# Patient Record
Sex: FEMALE | Race: OTHER | HISPANIC OR LATINO | Employment: OTHER | URBAN - METROPOLITAN AREA
[De-identification: names, ages, dates, MRNs, and addresses within clinical notes are randomized per-mention and may not be internally consistent; named-entity substitution may affect disease eponyms.]

---

## 2017-03-08 DIAGNOSIS — E78.5 DYSLIPIDEMIA: ICD-10-CM

## 2017-03-08 DIAGNOSIS — I10 ESSENTIAL HYPERTENSION: ICD-10-CM

## 2017-03-08 RX ORDER — LISINOPRIL 10 MG/1
10 TABLET ORAL DAILY
Qty: 30 TAB | Refills: 0 | OUTPATIENT
Start: 2017-03-08

## 2017-03-08 RX ORDER — SIMVASTATIN 20 MG
20 TABLET ORAL EVERY EVENING
Qty: 30 TAB | Refills: 0 | OUTPATIENT
Start: 2017-03-08

## 2017-03-08 NOTE — TELEPHONE ENCOUNTER
Was the patient seen in the last year in this department? No     Does patient have an active prescription for medications requested? No     Received Request Via: Patient

## 2017-06-16 ENCOUNTER — TELEPHONE (OUTPATIENT)
Dept: MEDICAL GROUP | Facility: PHYSICIAN GROUP | Age: 57
End: 2017-06-16

## 2017-06-16 ENCOUNTER — OFFICE VISIT (OUTPATIENT)
Dept: URGENT CARE | Facility: PHYSICIAN GROUP | Age: 57
End: 2017-06-16
Payer: MEDICARE

## 2017-06-16 VITALS
SYSTOLIC BLOOD PRESSURE: 134 MMHG | HEART RATE: 89 BPM | HEIGHT: 64 IN | WEIGHT: 150 LBS | BODY MASS INDEX: 25.61 KG/M2 | TEMPERATURE: 97.4 F | OXYGEN SATURATION: 92 % | DIASTOLIC BLOOD PRESSURE: 74 MMHG

## 2017-06-16 DIAGNOSIS — Z76.0 ENCOUNTER FOR MEDICATION REFILL: ICD-10-CM

## 2017-06-16 DIAGNOSIS — Z86.59 HISTORY OF ANXIETY DISORDER: ICD-10-CM

## 2017-06-16 DIAGNOSIS — Z87.898 HISTORY OF CHRONIC PAIN: ICD-10-CM

## 2017-06-16 PROCEDURE — 99213 OFFICE O/P EST LOW 20 MIN: CPT | Performed by: EMERGENCY MEDICINE

## 2017-06-16 RX ORDER — IBUPROFEN 800 MG/1
800 TABLET ORAL EVERY 8 HOURS PRN
COMMUNITY
End: 2017-06-16 | Stop reason: SDUPTHER

## 2017-06-16 RX ORDER — ALPRAZOLAM 0.5 MG/1
0.5 TABLET ORAL NIGHTLY PRN
COMMUNITY
End: 2017-07-18

## 2017-06-16 RX ORDER — IBUPROFEN 800 MG/1
800 TABLET ORAL EVERY 8 HOURS PRN
Qty: 30 TAB | Refills: 0 | Status: SHIPPED | OUTPATIENT
Start: 2017-06-16 | End: 2018-10-19

## 2017-06-16 RX ORDER — ZOLPIDEM TARTRATE 10 MG/1
10 TABLET ORAL NIGHTLY PRN
Qty: 5 TAB | Refills: 0 | Status: SHIPPED | OUTPATIENT
Start: 2017-06-16 | End: 2017-06-21

## 2017-06-16 RX ORDER — BACLOFEN 10 MG/1
10 TABLET ORAL 3 TIMES DAILY
Qty: 15 TAB | Refills: 0 | Status: SHIPPED | OUTPATIENT
Start: 2017-06-16 | End: 2017-06-21

## 2017-06-16 RX ORDER — ZOLPIDEM TARTRATE 10 MG/1
10 TABLET ORAL NIGHTLY PRN
COMMUNITY
End: 2017-06-16 | Stop reason: SDUPTHER

## 2017-06-16 RX ORDER — FENTANYL 50 UG/1
1 PATCH TRANSDERMAL
COMMUNITY
End: 2018-10-19

## 2017-06-16 RX ORDER — TRAMADOL HYDROCHLORIDE 50 MG/1
50 TABLET ORAL EVERY 4 HOURS PRN
COMMUNITY
End: 2017-06-16 | Stop reason: SDUPTHER

## 2017-06-16 RX ORDER — OXYCODONE HYDROCHLORIDE 10 MG/1
10 TABLET ORAL EVERY 4 HOURS PRN
Qty: 28 TAB | Refills: 0 | Status: SHIPPED | OUTPATIENT
Start: 2017-06-16 | End: 2017-06-21

## 2017-06-16 RX ORDER — BACLOFEN 10 MG/1
10 TABLET ORAL 3 TIMES DAILY
COMMUNITY
End: 2017-06-16 | Stop reason: SDUPTHER

## 2017-06-16 RX ORDER — FENOFIBRATE 145 MG/1
145 TABLET, COATED ORAL DAILY
Qty: 5 TAB | Refills: 0 | Status: SHIPPED | OUTPATIENT
Start: 2017-06-16 | End: 2017-06-21

## 2017-06-16 RX ORDER — FENOFIBRATE 145 MG/1
145 TABLET, COATED ORAL DAILY
COMMUNITY
End: 2017-06-16 | Stop reason: SDUPTHER

## 2017-06-16 RX ORDER — TRAMADOL HYDROCHLORIDE 50 MG/1
50 TABLET ORAL EVERY 4 HOURS PRN
Qty: 20 TAB | Refills: 0 | Status: SHIPPED | OUTPATIENT
Start: 2017-06-16 | End: 2017-06-21

## 2017-06-16 RX ORDER — FENTANYL 25 UG/1
1 PATCH TRANSDERMAL
Qty: 2 PATCH | Refills: 0 | Status: SHIPPED | OUTPATIENT
Start: 2017-06-16 | End: 2017-06-21

## 2017-06-16 RX ORDER — GABAPENTIN 800 MG/1
800 TABLET ORAL 3 TIMES DAILY
Status: ON HOLD | COMMUNITY
End: 2019-06-08

## 2017-06-16 RX ORDER — ERGOCALCIFEROL 1.25 MG/1
CAPSULE ORAL
COMMUNITY
End: 2017-07-18

## 2017-06-16 RX ORDER — GABAPENTIN 800 MG/1
800 TABLET ORAL 3 TIMES DAILY
Qty: 15 TAB | Refills: 0 | Status: SHIPPED | OUTPATIENT
Start: 2017-06-16 | End: 2017-06-21

## 2017-06-16 RX ORDER — OXYCODONE HYDROCHLORIDE 10 MG/1
10 TABLET ORAL EVERY 4 HOURS PRN
COMMUNITY
End: 2017-06-16 | Stop reason: SDUPTHER

## 2017-06-16 RX ORDER — LORAZEPAM 0.5 MG/1
0.5 TABLET ORAL 2 TIMES DAILY PRN
Qty: 10 TAB | Refills: 0 | Status: SHIPPED | OUTPATIENT
Start: 2017-06-16 | End: 2017-06-21

## 2017-06-16 ASSESSMENT — ENCOUNTER SYMPTOMS
INSOMNIA: 1
NERVOUS/ANXIOUS: 1
FEVER: 0
SHORTNESS OF BREATH: 0
ABDOMINAL PAIN: 0
DIARRHEA: 1
DEPRESSION: 1
NAUSEA: 1
FOCAL WEAKNESS: 0
VOMITING: 0

## 2017-06-16 NOTE — MR AVS SNAPSHOT
"Elina Skaggs   2017 12:20 PM   Office Visit   MRN: [id number]    Department:  [facility]   Dept Phone:  [phone]    Description:  Female : 1960   Provider:  Lane Lanier M.D.           Reason for Visit     Medication Refill wants a refil on all of medication       Allergies as of 2017     No Known Allergies      You were diagnosed with     Encounter for medication refill   [642086]       History of anxiety disorder   [642147]       History of chronic pain   [0882440]         Vital Signs     Blood Pressure Pulse Temperature Height Weight Body Mass Index    134/74 mmHg 89 36.3 °C (97.4 °F) 1.626 m (5' 4\") 68.04 kg (150 lb) 25.73 kg/m2    Oxygen Saturation Smoking Status                92% Current Every Day Smoker          Basic Information     Date Of Birth Sex Race Ethnicity Preferred Language    1960 Female  or , Other  Origin (Sinhala,Micronesian,Chilean,Equatorial Guinean, etc) English      Problem List              ICD-10-CM Priority Class Noted - Resolved    Radiculopathy M54.10   2012 - Present    Sensory disturbance R20.9   2012 - Present    DDD (degenerative disc disease) [id number]   2012 - Present    Hypertension I10   2012 - Present    Depression F32.9   2012 - Present    Chronic pain G89.29 Low  2012 - Present    Cervical stenosis of spinal canal M48.02 High  2012 - Present    Cervical myelopathy G95.9 High  2012 - Present    Constipation    2013 - Present    Impaired physical mobility Z74.09   2013 - Present    Chronic low back pain M54.5, G89.29   2014 - Present    Anxiety F41.9   2014 - Present    Dyslipidemia E78.5   2015 - Present    Impaired fasting blood sugar R73.01   2/10/2015 - Present    Closed fracture of right fibula S82.401A High  2015 - Present    Closed fracture of lumbar vertebra (CMS-HCC) S32.009A Low  2015 - Present    HTN (hypertension) I10 Medium  2015 - Present  "    Smoker F17.200 Low  6/13/2015 - Present    Trauma T14.90 Low  6/13/2015 - Present    Cellulitis L03.90   6/17/2015 - Present    Leg pain, right M79.604   6/30/2015 - Present    Closed fibular fracture S82.409A   6/30/2015 - Present      Health Maintenance        Date Due Completion Dates    PAP SMEAR 3/28/2016 3/28/2013, 10/25/2010    MAMMOGRAM 7/29/2016 7/29/2015, 6/30/2014, 4/12/2013, 4/9/2012, 11/5/2010, 10/28/2010    COLONOSCOPY 4/1/2024 4/1/2014 (Done), 9/25/2013 (Declined)    Override on 4/1/2014: Done    Override on 9/25/2013: Patient Declined    IMM DTaP/Tdap/Td Vaccine (3 - Td) 6/13/2025 6/13/2015, 10/25/2010            Current Immunizations     Influenza TIV (IM) 11/13/2014    Influenza Vaccine Pediatric 10/25/2010    Influenza Vaccine Quad Inj (Preserved) 11/19/2014    Pneumococcal polysaccharide vaccine (PPSV-23) 6/14/2015 10:36 AM, 6/14/2015, 6/9/2012  3:50 PM    Tdap Vaccine 6/13/2015  3:09 PM, 10/25/2010      Below and/or attached are the medications your provider expects you to take. Review all of your home medications and newly ordered medications with your provider and/or pharmacist. Follow medication instructions as directed by your provider and/or pharmacist. Please keep your medication list with you and share with your provider. Update the information when medications are discontinued, doses are changed, or new medications (including over-the-counter products) are added; and carry medication information at all times in the event of emergency situations     Allergies:  No Known Allergies          Medications  Valid as of: June 16, 2017 -  2:44 PM    Generic Name Brand Name Tablet Size Instructions for use    ALPRAZolam (Tab) XANAX 0.5 MG Take 0.5 mg by mouth as needed for Sleep or Anxiety.        ALPRAZolam (Tab) XANAX 0.25 MG Take 1 Tab by mouth every day.        ALPRAZolam (Tab) XANAX 0.5 MG Take 0.5 mg by mouth at bedtime as needed for Sleep.        ALPRAZolam (Tab) XANAX 0.5 MG Take 0.5 mg  by mouth at bedtime as needed for Sleep.        Bacitracin-Polymyxin B (Ointment) POLYSPORIN 500-25413 UNIT/GM Apply 1 Each to affected area(s) 3 times a day.        Baclofen (Tab) LIORESAL 10 MG Take 1 Tab by mouth 3 times a day for 5 days.        Enalapril Maleate (Tab) VASOTEC 10 MG Take 10 mg by mouth every day. Indications: High Blood Pressure        Enalapril Maleate (Tab) VASOTEC 20 MG TAKE 1 TABLET BY MOUTH TWICE A DAY        Ergocalciferol (Cap) DRISDOL 06157 UNITS Take  by mouth every 7 days.        Fenofibrate (Tab) TRICOR 145 MG Take 1 Tab by mouth every day for 5 days.        FentaNYL (PATCH 72 HR) DURAGESIC 12 MCG/HR Apply 1 Patch to skin as directed every 72 hours.        FentaNYL (PATCH 72 HR) DURAGESIC 12 MCG/HR Apply 1 Patch to skin as directed every 72 hours.        FentaNYL (PATCH 72 HR) DURAGESIC 50 MCG/HR Apply 1 Patch to skin as directed every 72 hours.        FentaNYL (PATCH 72 HR) DURAGESIC 25 MCG/HR Apply 1 Patch to skin as directed every 72 hours for 5 days.        FLUoxetine HCl (Cap) PROZAC 20 MG Take 1 Cap by mouth every day. Indications: Depression        FLUoxetine HCl (Cap) PROZAC 10 MG Take 10 mg by mouth every day. Indications: Depression        Gabapentin (Tab) NEURONTIN 800 MG Take 800 mg by mouth 3 times a day.        Gabapentin (Tab) NEURONTIN 800 MG Take 1 Tab by mouth 3 times a day for 5 days.        HydroCHLOROthiazide (Tab) HYDRODIURIL 25 MG TAKE 1 TABLET BY MOUTH EVERY DAY        Ibuprofen (Tab) MOTRIN 800 MG Take 1 Tab by mouth every 8 hours as needed.        Lisinopril (Tab) PRINIVIL 10 MG Take 10 mg by mouth every day. Indications: High Blood Pressure        LORazepam (Tab) ATIVAN 1 MG Take 0.5 mg by mouth every four hours as needed for Anxiety.        LORazepam (Tab) ATIVAN 0.5 MG Take 1 Tab by mouth 2 times a day as needed for Anxiety for up to 5 days. Indications: Anxiousness associated with Depression        OxyCODONE HCl (Tab) ROXICODONE 5 MG Take 1-2 Tabs by mouth  every 3 hours as needed.        OxyCODONE HCl (Tab) OXY-IR 15 MG Take 15 mg by mouth every four hours as needed for Severe Pain (up to 5 tabs per day).        OxyCODONE HCl (Tab) ROXICODONE 10 MG Take 1 Tab by mouth every four hours as needed for Moderate Pain for up to 5 days.        Oxycodone-Acetaminophen (Tab) PERCOCET 5-325 MG Take 1-2 Tabs by mouth every four hours as needed.        Potassium Chloride Bella CR (Tab CR) Kdur 20 MEQ Take 1 Tab by mouth every day.        Promethazine HCl (Tab) PHENERGAN 25 MG TAKE 1 TABLET BY MOUTH TWICE A DAY        Simvastatin (Tab) ZOCOR 20 MG Take 1 Tab by mouth every evening.        Sulfamethoxazole-Trimethoprim (Tab) BACTRIM DS,SEPTRA -160 MG Take 1 Tab by mouth every 12 hours.        TiZANidine HCl (Tab) ZANAFLEX 4 MG Take 4 mg by mouth 3 times a day.        TraMADol HCl (Tab) ULTRAM 50 MG Take 1 Tab by mouth every four hours as needed for up to 5 days.        Zolpidem Tartrate (Tab) AMBIEN 5 MG Take 5 mg by mouth at bedtime as needed for Sleep.        Zolpidem Tartrate (SL Tab) Zolpidem Tartrate 10 MG Place  under tongue.        Zolpidem Tartrate (Tab) AMBIEN 10 MG Take 1 Tab by mouth at bedtime as needed for Sleep for up to 5 days.        .                 Medicines prescribed today were sent to:     [facility]/PHARMACY #[id number] - [place], [place] - [address] [address]    Phone: [phone] Fax: [phone]    Open 24 Hours?: No      Medication refill instructions:       If your prescription bottle indicates you have medication refills left, it is not necessary to call your provider’s office. Please contact your pharmacy and they will refill your medication.    If your prescription bottle indicates you do not have any refills left, you may request refills at any time through one of the following ways: The online [employer] system (except Urgent Care), by calling your provider’s office, or by asking your pharmacy to  contact your provider’s office with a refill request. Medication refills are processed only during regular business hours and may not be available until the next business day. Your provider may request additional information or to have a follow-up visit with you prior to refilling your medication.   *Please Note: Medication refills are assigned a new Rx number when refilled electronically. Your pharmacy may indicate that no refills were authorized even though a new prescription for the same medication is available at the pharmacy. Please request the medicine by name with the pharmacy before contacting your provider for a refill.        Other Notes About Your Plan     Elina is a Medical Home Patient at [facility].           Navegg Access Code: [email or web address]  Expires: 7/16/2017  2:44 PM    Navegg  A secure, online tool to manage your health information     [employer] Navegg® is a secure, online tool that connects you to your personalized health information from the privacy of your home -- day or night - making it very easy for you to manage your healthcare. Once the activation process is completed, you can even access your medical information using the Navegg nathalie, which is available for free in the [employer] Nathalie store or Google Play store.     Navegg provides the following levels of access (as shown below):   My Chart Features   [facility] Doctor [facility]  Specialists [facility]  Urgent  Care Non-[facility]  Primary Care  Doctor   Email your healthcare team securely and privately 24/7 X X X    Manage appointments: schedule your next appointment; view details of past/upcoming appointments X      Request prescription refills. X      View recent personal medical records, including lab and immunizations X X X X   View health record, including health history, allergies, medications X X X X   Read reports about your outpatient visits, procedures, consult and ER notes X X X X   See your discharge summary,  which is a recap of your hospital and/or ER visit that includes your diagnosis, lab results, and care plan. X X       How to register for [employer]:  1. Go to  [email or web address].  2. Click on the Sign Up Now box, which takes you to the New Member Sign Up page. You will need to provide the following information:  a. Enter your [employer] Access Code exactly as it appears at the top of this page. (You will not need to use this code after you’ve completed the sign-up process. If you do not sign up before the expiration date, you must request a new code.)   b. Enter your date of birth.   c. Enter your home email address.   d. Click Submit, and follow the next screen’s instructions.  3. Create a [employer] ID. This will be your [employer] login ID and cannot be changed, so think of one that is secure and easy to remember.  4. Create a [employer] password. You can change your password at any time.  5. Enter your Password Reset Question and Answer. This can be used at a later time if you forget your password.   6. Enter your e-mail address. This allows you to receive e-mail notifications when new information is available in [employer].  7. Click Sign Up. You can now view your health information.    For assistance activating your [employer] account, call [phone]        Quit Tobacco Information     Do you want to quit using tobacco?    Quitting tobacco decreases risks of cancer, heart and lung disease, increases life expectancy, improves sense of taste and smell, and increases spending money, among other benefits.    If you are thinking about quitting, we can help.  • [employer] Quit Tobacco Program: 360.148.1810  o Program occurs weekly for four weeks and includes pharmacist consultation on products to support quitting smoking or chewing tobacco. A provider referral is needed for pharmacist consultation.  • Tobacco Users Help Hotline: 5-605-QUIT-NOW (303-8735) or https://[place].quitlogix.org/  o Free, confidential telephone and  online coaching for [place] residents. Sessions are designed on a schedule that is convenient for you. Eligible clients receive free nicotine replacement therapy.  • Nationally: www.smokefree.gov  o Information and professional assistance to support both immediate and long-term needs as you become, and remain, a non-smoker. Smokefree.gov allows you to choose the help that best fits your needs.

## 2017-06-16 NOTE — PROGRESS NOTES
Subjective:      Elina Skaggs is a 57 y.o. female who presents with Medication Refill            Other  This is a chronic problem. The problem occurs daily. The problem has been unchanged. Associated symptoms include nausea. Pertinent negatives include no abdominal pain, chest pain, fever, rash or vomiting.    patient states previously seen by a local primary care provider and pain management specialist. Was prescribed a course of medication that she tolerated well; moved out of state for 3 months. States returned to live here again 3 days ago; states that her estranged  got rid of all of her medications before her departure. She did not attempt to refill medications with the out-of-state providers; presents requesting refill for all medications.    Review of Systems   Constitutional: Negative for fever.   Respiratory: Negative for shortness of breath.    Cardiovascular: Negative for chest pain.   Gastrointestinal: Positive for nausea and diarrhea. Negative for vomiting and abdominal pain.   Skin: Negative for rash.   Neurological: Negative for focal weakness.   Psychiatric/Behavioral: Positive for depression. Negative for suicidal ideas. The patient is nervous/anxious and has insomnia.      PMH:  has a past medical history of Hypertension; Dental disorder; Snoring; Sleep apnea; Psychiatric problem; Anxiety (1993); Constipation (9/27/2013); Impaired physical mobility (9/30/2013); Chronic back pain; and MEDICAL HOME (6/2015).  MEDS:   Current outpatient prescriptions:   •  gabapentin (NEURONTIN) 800 MG tablet, Take 800 mg by mouth 3 times a day., Disp: , Rfl:   •  alprazolam (XANAX) 0.5 MG Tab, Take 0.5 mg by mouth at bedtime as needed for Sleep., Disp: , Rfl:   •  fentanyl (DURAGESIC) 50 MCG/HR PATCH 72 HR, Apply 1 Patch to skin as directed every 72 hours., Disp: , Rfl:   •  Zolpidem Tartrate 10 MG SL Tab, Place  under tongue., Disp: , Rfl:   •  alprazolam (XANAX) 0.5 MG Tab, Take 0.5 mg by mouth at bedtime  as needed for Sleep., Disp: , Rfl:   •  vitamin D, Ergocalciferol, (DRISDOL) 53833 UNITS Cap capsule, Take  by mouth every 7 days., Disp: , Rfl:   •  lorazepam (ATIVAN) 0.5 MG Tab, Take 1 Tab by mouth 2 times a day as needed for Anxiety for up to 5 days. Indications: Anxiousness associated with Depression, Disp: 10 Tab, Rfl: 0  •  fentanyl (DURAGESIC) 25 MCG/HR PATCH 72 HR, Apply 1 Patch to skin as directed every 72 hours for 5 days., Disp: 2 Patch, Rfl: 0  •  oxycodone immediate release (ROXICODONE) 10 MG immediate release tablet, Take 1 Tab by mouth every four hours as needed for Moderate Pain for up to 5 days., Disp: 28 Tab, Rfl: 0  •  baclofen (LIORESAL) 10 MG Tab, Take 1 Tab by mouth 3 times a day for 5 days., Disp: 15 Tab, Rfl: 0  •  gabapentin (NEURONTIN) 800 MG tablet, Take 1 Tab by mouth 3 times a day for 5 days., Disp: 15 Tab, Rfl: 0  •  zolpidem (AMBIEN) 10 MG Tab, Take 1 Tab by mouth at bedtime as needed for Sleep for up to 5 days., Disp: 5 Tab, Rfl: 0  •  fenofibrate (TRICOR) 145 MG Tab, Take 1 Tab by mouth every day for 5 days., Disp: 5 Tab, Rfl: 0  •  tramadol (ULTRAM) 50 MG Tab, Take 1 Tab by mouth every four hours as needed for up to 5 days., Disp: 20 Tab, Rfl: 0  •  ibuprofen (MOTRIN) 800 MG Tab, Take 1 Tab by mouth every 8 hours as needed., Disp: 30 Tab, Rfl: 0  •  oxycodone-acetaminophen (PERCOCET) 5-325 MG Tab, Take 1-2 Tabs by mouth every four hours as needed., Disp: 30 Tab, Rfl: 0  •  potassium chloride SA (K-DUR) 20 MEQ Tab CR, Take 1 Tab by mouth every day., Disp: 90 Tab, Rfl: 1  •  alprazolam (XANAX) 0.25 MG Tab, Take 1 Tab by mouth every day., Disp: 30 Tab, Rfl: 0  •  promethazine (PHENERGAN) 25 MG Tab, TAKE 1 TABLET BY MOUTH TWICE A DAY, Disp: , Rfl: 0  •  fentanyl (DURAGESIC) 12 MCG/HR PT72, Apply 1 Patch to skin as directed every 72 hours., Disp: , Rfl:   •  oxycodone (OXY-IR) 15 MG immediate release tablet, Take 15 mg by mouth every four hours as needed for Severe Pain (up to 5 tabs  per day)., Disp: , Rfl:   •  tizanidine (ZANAFLEX) 4 MG TABS, Take 4 mg by mouth 3 times a day., Disp: , Rfl:   •  hydrochlorothiazide (HYDRODIURIL) 25 MG TABS, TAKE 1 TABLET BY MOUTH EVERY DAY, Disp: 90 Tab, Rfl: 0  •  enalapril (VASOTEC) 20 MG tablet, TAKE 1 TABLET BY MOUTH TWICE A DAY, Disp: 180 Tab, Rfl: 0  •  oxycodone immediate-release (ROXICODONE) 5 MG TABS, Take 1-2 Tabs by mouth every 3 hours as needed., Disp: 30 Tab, Rfl: 0  •  lisinopril (PRINIVIL) 10 MG TABS, Take 10 mg by mouth every day. Indications: High Blood Pressure, Disp: , Rfl:   •  enalapril (VASOTEC) 10 MG TABS, Take 10 mg by mouth every day. Indications: High Blood Pressure, Disp: , Rfl:   •  alprazolam (XANAX) 0.5 MG TABS, Take 0.5 mg by mouth as needed for Sleep or Anxiety., Disp: , Rfl:   •  fentanyl (DURAGESIC) 12 MCG/HR PT72, Apply 1 Patch to skin as directed every 72 hours., Disp: , Rfl:   •  zolpidem (AMBIEN) 5 MG TABS, Take 5 mg by mouth at bedtime as needed for Sleep., Disp: , Rfl:   •  simvastatin (ZOCOR) 20 MG TABS, Take 1 Tab by mouth every evening., Disp: 90 Tab, Rfl: 1  •  sulfamethoxazole-trimethoprim (BACTRIM DS,SEPTRA DS) 800-160 MG Tab oral tablet, Take 1 Tab by mouth every 12 hours., Disp: 28 Tab, Rfl: 0  •  bacitracin-polymyxin b (POLYSPORIN) 500-50880 UNIT/GM OINT, Apply 1 Each to affected area(s) 3 times a day., Disp: 1 Tube, Rfl: 0  •  lorazepam (ATIVAN) 1 MG TABS, Take 0.5 mg by mouth every four hours as needed for Anxiety., Disp: , Rfl:   •  fluoxetine (PROZAC) 10 MG CAPS, Take 10 mg by mouth every day. Indications: Depression, Disp: , Rfl:   •  fluoxetine (PROZAC) 20 MG CAPS, Take 1 Cap by mouth every day. Indications: Depression, Disp: 90 Cap, Rfl: 0  ALLERGIES: No Known Allergies  SURGHX:   Past Surgical History   Procedure Laterality Date   • Tubal coagulation laparoscopic bilateral     • Appendectomy     • Appendectomy child  1974   • Cervical disk and fusion anterior  11/16/2010     Performed by PAVEL KIM  "SURGERY [facility] ORS   • Cervical fusion posterior  6/19/2012     Performed by PAVEL KIM at SURGERY [facility] ORS   • Colonoscopy with polyp  4/1/13     performed by Dr. Kelley -sigmoid colon polyp-fragments of tubular adenoma     SOCHX:  reports that she has been smoking Cigarettes.  She has been smoking about 0.25 packs per day. She has never used smokeless tobacco. She reports that she does not drink alcohol or use illicit drugs.  FH: family history includes Cancer in her mother; Diabetes in her brother, father, and sister; Genitourinary () in her father and sister; Heart Disease in her father.       Objective:     /74 mmHg  Pulse 89  Temp(Src) 36.3 °C (97.4 °F)  Ht 1.626 m (5' 4\")  Wt 68.04 kg (150 lb)  BMI 25.73 kg/m2  SpO2 92%     Physical Exam   Constitutional: She is oriented to person, place, and time. She appears well-developed and well-nourished. She is cooperative.  Non-toxic appearance.   HENT:   Head: Normocephalic.   Nose: No rhinorrhea.   Mouth/Throat: Mucous membranes are normal.   Eyes: Conjunctivae and EOM are normal. Pupils are equal, round, and reactive to light.   Neck: Phonation normal. Neck supple.   Cardiovascular: Normal rate, regular rhythm and normal heart sounds.    No murmur heard.  No significant pedal edema.   Pulmonary/Chest: Effort normal and breath sounds normal.   Abdominal: She exhibits no mass. There is no tenderness.   Neurological: She is alert and oriented to person, place, and time. She displays no tremor. She exhibits normal muscle tone. Coordination and gait normal.   Skin: Skin is warm and dry.        Psychiatric: Her speech is normal and behavior is normal. Thought content normal. Her mood appears anxious. Her affect is not inappropriate. She is not actively hallucinating. Cognition and memory are not impaired. She does not express inappropriate judgment.          Obtained current medication list from pharmacy; 5 day refill of current medications " provided. Prior local PCP office contacted; spoke with provider, will arrange follow-up appointment within 4-5 days.     Assessment/Plan:     1. Encounter for medication refill    - lorazepam (ATIVAN) 0.5 MG Tab; Take 1 Tab by mouth 2 times a day as needed for Anxiety for up to 5 days. Indications: Anxiousness associated with Depression  Dispense: 10 Tab; Refill: 0  - fentanyl (DURAGESIC) 25 MCG/HR PATCH 72 HR; Apply 1 Patch to skin as directed every 72 hours for 5 days.  Dispense: 2 Patch; Refill: 0  - oxycodone immediate release (ROXICODONE) 10 MG immediate release tablet; Take 1 Tab by mouth every four hours as needed for Moderate Pain for up to 5 days.  Dispense: 28 Tab; Refill: 0  - baclofen (LIORESAL) 10 MG Tab; Take 1 Tab by mouth 3 times a day for 5 days.  Dispense: 15 Tab; Refill: 0  - gabapentin (NEURONTIN) 800 MG tablet; Take 1 Tab by mouth 3 times a day for 5 days.  Dispense: 15 Tab; Refill: 0  - zolpidem (AMBIEN) 10 MG Tab; Take 1 Tab by mouth at bedtime as needed for Sleep for up to 5 days.  Dispense: 5 Tab; Refill: 0  - fenofibrate (TRICOR) 145 MG Tab; Take 1 Tab by mouth every day for 5 days.  Dispense: 5 Tab; Refill: 0  - tramadol (ULTRAM) 50 MG Tab; Take 1 Tab by mouth every four hours as needed for up to 5 days.  Dispense: 20 Tab; Refill: 0  - ibuprofen (MOTRIN) 800 MG Tab; Take 1 Tab by mouth every 8 hours as needed.  Dispense: 30 Tab; Refill: 0    2. History of anxiety disorder    3. History of chronic pain

## 2017-06-16 NOTE — TELEPHONE ENCOUNTER
Spoke with Dr. Lanier who was seeing Elina at [facility]. Patient presented requesting multiple medication refills including controlled substances. I will ask our office staff to reach out to patient to schedule an appointment for her to re-establish with another provider at our office.    I informed our office's area  who is aware of patient. She did have an appointment to establish with a provider at [facility] yesterday and was a no show. The patient apparently spoke briefly with someone at that office and stated she would go to urgent care. She was not instructed to go to  by [facility] staff.    Again, we will reach out to the patient to set up another appointment.    Lacey Adames M.D.

## 2017-07-18 ENCOUNTER — OFFICE VISIT (OUTPATIENT)
Dept: MEDICAL GROUP | Facility: PHYSICIAN GROUP | Age: 57
End: 2017-07-18
Payer: MEDICARE

## 2017-07-18 VITALS
TEMPERATURE: 98.8 F | HEIGHT: 64 IN | OXYGEN SATURATION: 98 % | HEART RATE: 76 BPM | WEIGHT: 135 LBS | BODY MASS INDEX: 23.05 KG/M2 | DIASTOLIC BLOOD PRESSURE: 84 MMHG | SYSTOLIC BLOOD PRESSURE: 162 MMHG | RESPIRATION RATE: 16 BRPM

## 2017-07-18 DIAGNOSIS — F17.200 SMOKER: ICD-10-CM

## 2017-07-18 DIAGNOSIS — T14.90XA TRAUMA: ICD-10-CM

## 2017-07-18 DIAGNOSIS — E87.6 HYPOKALEMIA: ICD-10-CM

## 2017-07-18 DIAGNOSIS — I10 ESSENTIAL HYPERTENSION: ICD-10-CM

## 2017-07-18 DIAGNOSIS — E78.5 DYSLIPIDEMIA: ICD-10-CM

## 2017-07-18 DIAGNOSIS — G89.29 OTHER CHRONIC PAIN: ICD-10-CM

## 2017-07-18 DIAGNOSIS — F33.41 RECURRENT MAJOR DEPRESSIVE DISORDER, IN PARTIAL REMISSION (HCC): ICD-10-CM

## 2017-07-18 PROCEDURE — 99204 OFFICE O/P NEW MOD 45 MIN: CPT | Performed by: INTERNAL MEDICINE

## 2017-07-18 RX ORDER — HYDROCHLOROTHIAZIDE 25 MG/1
25 TABLET ORAL
Qty: 30 TAB | Refills: 1 | Status: SHIPPED | OUTPATIENT
Start: 2017-07-18 | End: 2018-10-19

## 2017-07-18 RX ORDER — ENALAPRIL MALEATE 20 MG/1
20 TABLET ORAL DAILY
Qty: 30 TAB | Refills: 1 | Status: SHIPPED | OUTPATIENT
Start: 2017-07-18 | End: 2018-04-25 | Stop reason: SDUPTHER

## 2017-07-18 RX ORDER — POTASSIUM CHLORIDE 20 MEQ/1
20 TABLET, EXTENDED RELEASE ORAL DAILY
Qty: 30 TAB | Refills: 1 | Status: SHIPPED | OUTPATIENT
Start: 2017-07-18 | End: 2018-07-17 | Stop reason: SDUPTHER

## 2017-07-18 ASSESSMENT — PATIENT HEALTH QUESTIONNAIRE - PHQ9
SUM OF ALL RESPONSES TO PHQ QUESTIONS 1-9: 12
5. POOR APPETITE OR OVEREATING: 3 - NEARLY EVERY DAY
CLINICAL INTERPRETATION OF PHQ2 SCORE: 5

## 2017-07-18 NOTE — MR AVS SNAPSHOT
"Elina Skaggs   2017 11:20 AM   Office Visit   MRN: [id number]    Department:  [facility]   Dept Phone:  [phone]    Description:  Female : 1960   Provider:  Samanta Fournier M.D.           Reason for Visit     Hypertension     Medication Management     Stress Pt  left aft 40 years is really distrught      Allergies as of 2017     No Known Allergies      You were diagnosed with     Essential hypertension   [1537532]       Recurrent major depressive disorder, in partial remission (CMS-HCC)   [8223348]       Dyslipidemia   [524716]       Smoker   [695104]       Trauma   [146997]       Other chronic pain   [338.29.ICD-9-CM]       Hypokalemia   [078518]         Vital Signs     Blood Pressure Pulse Temperature Respirations Height Weight    162/84 mmHg 76 37.1 °C (98.8 °F) 16 1.626 m (5' 4.02\") 61.236 kg (135 lb)    Body Mass Index Oxygen Saturation Breastfeeding? Smoking Status          23.16 kg/m2 98% No Current Every Day Smoker        Basic Information     Date Of Birth Sex Race Ethnicity Preferred Language    1960 Female  or , Other  Origin (Slovak,German,Belgian,Citizen of [place], etc) English      Your appointments     2017  9:30 AM   Adult Draw/Collection with [facility]   [facility] (--)    [address]  [place] [address] [phone]              Problem List              ICD-10-CM Priority Class Noted - Resolved    Radiculopathy M54.10   2012 - Present    Sensory disturbance R20.9   2012 - Present    DDD (degenerative disc disease) [id number]   2012 - Present    Depression F32.9   2012 - Present    Chronic pain G89.29 Low  2012 - Present    Cervical stenosis of spinal canal M48.02 High  2012 - Present    Cervical myelopathy G95.9 High  2012 - Present    Constipation    2013 - Present    Impaired physical mobility Z74.09   2013 - Present    Chronic low back pain M54.5, G89.29   2014 - Present    Anxiety F41.9  "  11/19/2014 - Present    Dyslipidemia E78.5   2/9/2015 - Present    Impaired fasting blood sugar R73.01   2/10/2015 - Present    Closed fracture of right fibula S82.401A High  6/13/2015 - Present    Closed fracture of lumbar vertebra (CMS-HCC) S32.009A Low  6/13/2015 - Present    HTN (hypertension) I10 Medium  6/13/2015 - Present    Smoker F17.200 Low  6/13/2015 - Present    Trauma T14.90 Low  6/13/2015 - Present    Leg pain, right M79.604   6/30/2015 - Present    Closed fibular fracture S82.409A   6/30/2015 - Present      Health Maintenance        Date Due Completion Dates    PAP SMEAR 3/28/2016 3/28/2013, 10/25/2010    MAMMOGRAM 7/29/2016 7/29/2015, 6/30/2014, 4/12/2013, 4/9/2012, 11/5/2010, 10/28/2010    IMM INFLUENZA (1) 9/1/2017 11/19/2014, 11/13/2014, 10/25/2010    COLONOSCOPY 4/1/2024 4/1/2014 (Done), 9/25/2013 (Declined)    Override on 4/1/2014: Done    Override on 9/25/2013: Patient Declined    IMM DTaP/Tdap/Td Vaccine (3 - Td) 6/13/2025 6/13/2015, 10/25/2010            Current Immunizations     Influenza TIV (IM) 11/13/2014    Influenza Vaccine Pediatric 10/25/2010    Influenza Vaccine Quad Inj (Preserved) 11/19/2014    Pneumococcal polysaccharide vaccine (PPSV-23) 6/14/2015 10:36 AM, 6/14/2015, 6/9/2012  3:50 PM    Tdap Vaccine 6/13/2015  3:09 PM, 10/25/2010      Below and/or attached are the medications your provider expects you to take. Review all of your home medications and newly ordered medications with your provider and/or pharmacist. Follow medication instructions as directed by your provider and/or pharmacist. Please keep your medication list with you and share with your provider. Update the information when medications are discontinued, doses are changed, or new medications (including over-the-counter products) are added; and carry medication information at all times in the event of emergency situations     Allergies:  No Known Allergies          Medications  Valid as of: July 18, 2017 -  1:12 PM     Generic Name Brand Name Tablet Size Instructions for use    ALPRAZolam (Tab) XANAX 0.25 MG Take 1 Tab by mouth every day.        Enalapril Maleate (Tab) VASOTEC 20 MG Take 1 Tab by mouth every day.        FentaNYL (PATCH 72 HR) DURAGESIC 12 MCG/HR Apply 1 Patch to skin as directed every 72 hours.        FentaNYL (PATCH 72 HR) DURAGESIC 12 MCG/HR Apply 1 Patch to skin as directed every 72 hours.        FentaNYL (PATCH 72 HR) DURAGESIC 50 MCG/HR Apply 1 Patch to skin as directed every 72 hours.        Gabapentin (Tab) NEURONTIN 800 MG Take 800 mg by mouth 3 times a day.        HydroCHLOROthiazide (Tab) HYDRODIURIL 25 MG Take 1 Tab by mouth every day.        Ibuprofen (Tab) MOTRIN 800 MG Take 1 Tab by mouth every 8 hours as needed.        LORazepam (Tab) ATIVAN 1 MG Take 0.5 mg by mouth every four hours as needed for Anxiety.        OxyCODONE HCl (Tab) OXY-IR 15 MG Take 15 mg by mouth every four hours as needed for Severe Pain (up to 5 tabs per day).        Oxycodone-Acetaminophen (Tab) PERCOCET 5-325 MG Take 1-2 Tabs by mouth every four hours as needed.        Potassium Chloride Bella CR (Tab CR) Kdur 20 MEQ Take 1 Tab by mouth every day.        Promethazine HCl (Tab) PHENERGAN 25 MG TAKE 1 TABLET BY MOUTH TWICE A DAY        Simvastatin (Tab) ZOCOR 20 MG Take 1 Tab by mouth every evening.        TiZANidine HCl (Tab) ZANAFLEX 4 MG Take 4 mg by mouth 3 times a day.        Varenicline Tartrate (Misc) CHANTIX LEYLA 0.5 MG X 11 & 1 MG X 42 0.5 mg by mouth once daily for 3 days, then 0.5 mg by mouth twice daily for 4 days, then 1 mg by mouth once daily.        Zolpidem Tartrate (Tab) AMBIEN 5 MG Take 5 mg by mouth at bedtime as needed for Sleep.        .                 Medicines prescribed today were sent to:     [facility][id number] - [place], [place] - [address] AT [address] [place] [address]    Phone: [phone] Fax: [phone]    Open 24 Hours?: No    [facility] [id number]  - [place], [place] - [address] AT [facility] [address]    [address] [place] [address]    Phone: [phone] Fax: [phone]    Open 24 Hours?: No      Medication refill instructions:       If your prescription bottle indicates you have medication refills left, it is not necessary to call your provider’s office. Please contact your pharmacy and they will refill your medication.    If your prescription bottle indicates you do not have any refills left, you may request refills at any time through one of the following ways: The online [employer] system (except Urgent Care), by calling your provider’s office, or by asking your pharmacy to contact your provider’s office with a refill request. Medication refills are processed only during regular business hours and may not be available until the next business day. Your provider may request additional information or to have a follow-up visit with you prior to refilling your medication.   *Please Note: Medication refills are assigned a new Rx number when refilled electronically. Your pharmacy may indicate that no refills were authorized even though a new prescription for the same medication is available at the pharmacy. Please request the medicine by name with the pharmacy before contacting your provider for a refill.        Your To Do List     Future Labs/Procedures Complete By Expires    CBC WITH DIFFERENTIAL  As directed 7/19/2018    COMP METABOLIC PANEL  As directed 7/19/2018    LIPID PROFILE  As directed 7/19/2018    TSH WITH REFLEX TO FT4  As directed 7/18/2018    VITAMIN D,25 HYDROXY  As directed 7/19/2018      Other Notes About Your Plan     Elina is a Medical Home Patient at [facility].           [employer] Access Code: [email or web address]  Expires: 8/17/2017  1:10 PM    [employer]  A secure, online tool to manage your health information     [employer]’s [employer]® is a secure, online tool that connects you to your personalized health information from the  privacy of your home -- day or night - making it very easy for you to manage your healthcare. Once the activation process is completed, you can even access your medical information using the [employer] nathalie, which is available for free in the Apple Nathalie store or Google Play store.     [employer] provides the following levels of access (as shown below):   My Chart Features   [facility] Doctor [facility]  Specialists [facility]  Urgent  Care Non-[facility]  Primary Care  Doctor   Email your healthcare team securely and privately 24/7 X X X    Manage appointments: schedule your next appointment; view details of past/upcoming appointments X      Request prescription refills. X      View recent personal medical records, including lab and immunizations X X X X   View health record, including health history, allergies, medications X X X X   Read reports about your outpatient visits, procedures, consult and ER notes X X X X   See your discharge summary, which is a recap of your hospital and/or ER visit that includes your diagnosis, lab results, and care plan. X X       How to register for [employer]:  1. Go to  [email or web address].  2. Click on the Sign Up Now box, which takes you to the New Member Sign Up page. You will need to provide the following information:  a. Enter your [employer] Access Code exactly as it appears at the top of this page. (You will not need to use this code after you’ve completed the sign-up process. If you do not sign up before the expiration date, you must request a new code.)   b. Enter your date of birth.   c. Enter your home email address.   d. Click Submit, and follow the next screen’s instructions.  3. Create a [employer] ID. This will be your [employer] login ID and cannot be changed, so think of one that is secure and easy to remember.  4. Create a [employer] password. You can change your password at any time.  5. Enter your Password Reset Question and Answer. This can be used at a later time if you forget  your password.   6. Enter your e-mail address. This allows you to receive e-mail notifications when new information is available in [employer].  7. Click Sign Up. You can now view your health information.    For assistance activating your [employer] account, call [phone]        Quit Tobacco Information     Do you want to quit using tobacco?    Quitting tobacco decreases risks of cancer, heart and lung disease, increases life expectancy, improves sense of taste and smell, and increases spending money, among other benefits.    If you are thinking about quitting, we can help.  • [facility] Quit Tobacco Program: [phone]  o Program occurs weekly for four weeks and includes pharmacist consultation on products to support quitting smoking or chewing tobacco. A provider referral is needed for pharmacist consultation.  • Tobacco Users Help Hotline: 2-635-QUIT-NOW (084-7231) or https://[place].quitlogix.org/  o Free, confidential telephone and online coaching for [place] residents. Sessions are designed on a schedule that is convenient for you. Eligible clients receive free nicotine replacement therapy.  • Nationally: www.smokefree.gov  o Information and professional assistance to support both immediate and long-term needs as you become, and remain, a non-smoker. Smokefree.gov allows you to choose the help that best fits your needs.

## 2017-07-20 NOTE — ASSESSMENT & PLAN NOTE
She is on enalapril 20 mg daily, hydrochlorothiazide 25 mg daily. Sherun out of medication,  currently she is not taking anything. The patient in the clinic /84, similar at home. She denies headache, chest pain, shortness of breath, leg swelling condition, blurry visions. Resume meds

## 2017-07-20 NOTE — ASSESSMENT & PLAN NOTE
Continues to smoke. Counseling provided. Pt agreed on started chantix. Continue to monitor. Follow up with Dr. diaz
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
## 2017-07-20 NOTE — ASSESSMENT & PLAN NOTE
She is on simvastatin 20 mg daily. Lipid panel 06/26/2015. LDL at that time 89. She was tolerating current regime well. Continue to monitor
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
## 2018-04-25 DIAGNOSIS — I10 ESSENTIAL HYPERTENSION: ICD-10-CM

## 2018-04-25 RX ORDER — ENALAPRIL MALEATE 20 MG/1
TABLET ORAL
Qty: 30 TAB | Refills: 0 | Status: SHIPPED | OUTPATIENT
Start: 2018-04-25 | End: 2018-05-26 | Stop reason: SDUPTHER

## 2018-04-26 NOTE — TELEPHONE ENCOUNTER
Phone Number Called: [phone] (home)     Message: LVM informing patient rx was sent however needs appointment prior to future refills.  Scheduling phone # provided.       Left Message for patient to call back: yes

## 2018-05-08 ENCOUNTER — HOSPITAL ENCOUNTER (OUTPATIENT)
Dept: RADIOLOGY | Facility: MEDICAL CENTER | Age: 58
End: 2018-05-08
Attending: PHYSICIAN ASSISTANT
Payer: COMMERCIAL

## 2018-05-08 DIAGNOSIS — M79.652 LEFT THIGH PAIN: ICD-10-CM

## 2018-05-08 DIAGNOSIS — M79.651 RIGHT THIGH PAIN: ICD-10-CM

## 2018-05-08 PROCEDURE — 93922 UPR/L XTREMITY ART 2 LEVELS: CPT

## 2018-05-08 PROCEDURE — 93922 UPR/L XTREMITY ART 2 LEVELS: CPT | Mod: 26 | Performed by: SURGERY

## 2018-05-26 DIAGNOSIS — I10 ESSENTIAL HYPERTENSION: ICD-10-CM

## 2018-05-29 RX ORDER — ENALAPRIL MALEATE 20 MG/1
TABLET ORAL
Qty: 30 TAB | Refills: 0 | Status: SHIPPED | OUTPATIENT
Start: 2018-05-29 | End: 2018-06-24 | Stop reason: SDUPTHER

## 2018-06-04 ENCOUNTER — HOSPITAL ENCOUNTER (OUTPATIENT)
Dept: LAB | Facility: MEDICAL CENTER | Age: 58
End: 2018-06-04
Attending: INTERNAL MEDICINE
Payer: COMMERCIAL

## 2018-06-04 ENCOUNTER — HOSPITAL ENCOUNTER (OUTPATIENT)
Dept: RADIOLOGY | Facility: MEDICAL CENTER | Age: 58
End: 2018-06-04
Payer: COMMERCIAL

## 2018-06-04 ENCOUNTER — HOSPITAL ENCOUNTER (OUTPATIENT)
Dept: LAB | Facility: MEDICAL CENTER | Age: 58
End: 2018-06-04
Attending: PHYSICIAN ASSISTANT
Payer: COMMERCIAL

## 2018-06-04 VITALS
TEMPERATURE: 97.6 F | DIASTOLIC BLOOD PRESSURE: 74 MMHG | BODY MASS INDEX: 22.53 KG/M2 | RESPIRATION RATE: 16 BRPM | WEIGHT: 132 LBS | OXYGEN SATURATION: 92 % | SYSTOLIC BLOOD PRESSURE: 112 MMHG | HEART RATE: 78 BPM | HEIGHT: 64 IN

## 2018-06-04 DIAGNOSIS — M47.816 LUMBAR SPONDYLOSIS: ICD-10-CM

## 2018-06-04 DIAGNOSIS — E78.5 DYSLIPIDEMIA: ICD-10-CM

## 2018-06-04 DIAGNOSIS — T14.90XA TRAUMA: ICD-10-CM

## 2018-06-04 DIAGNOSIS — I10 ESSENTIAL HYPERTENSION: ICD-10-CM

## 2018-06-04 DIAGNOSIS — F33.41 RECURRENT MAJOR DEPRESSIVE DISORDER, IN PARTIAL REMISSION (HCC): ICD-10-CM

## 2018-06-04 DIAGNOSIS — F17.200 SMOKER: ICD-10-CM

## 2018-06-04 LAB
25(OH)D3 SERPL-MCNC: 18 NG/ML (ref 30–100)
ALBUMIN SERPL BCP-MCNC: 4.1 G/DL (ref 3.2–4.9)
ALBUMIN SERPL BCP-MCNC: 4.1 G/DL (ref 3.2–4.9)
ALBUMIN/GLOB SERPL: 1.2 G/DL
ALBUMIN/GLOB SERPL: 1.3 G/DL
ALP SERPL-CCNC: 80 U/L (ref 30–99)
ALP SERPL-CCNC: 85 U/L (ref 30–99)
ALT SERPL-CCNC: 10 U/L (ref 2–50)
ALT SERPL-CCNC: 11 U/L (ref 2–50)
ANION GAP SERPL CALC-SCNC: 5 MMOL/L (ref 0–11.9)
ANION GAP SERPL CALC-SCNC: 7 MMOL/L (ref 0–11.9)
AST SERPL-CCNC: 11 U/L (ref 12–45)
AST SERPL-CCNC: 12 U/L (ref 12–45)
BASOPHILS # BLD AUTO: 1.4 % (ref 0–1.8)
BASOPHILS # BLD AUTO: 1.6 % (ref 0–1.8)
BASOPHILS # BLD: 0.07 K/UL (ref 0–0.12)
BASOPHILS # BLD: 0.08 K/UL (ref 0–0.12)
BILIRUB SERPL-MCNC: 0.4 MG/DL (ref 0.1–1.5)
BILIRUB SERPL-MCNC: 0.4 MG/DL (ref 0.1–1.5)
BUN SERPL-MCNC: 14 MG/DL (ref 8–22)
BUN SERPL-MCNC: 15 MG/DL (ref 8–22)
CALCIUM SERPL-MCNC: 9.3 MG/DL (ref 8.5–10.5)
CALCIUM SERPL-MCNC: 9.5 MG/DL (ref 8.5–10.5)
CHLORIDE SERPL-SCNC: 103 MMOL/L (ref 96–112)
CHLORIDE SERPL-SCNC: 104 MMOL/L (ref 96–112)
CHOLEST SERPL-MCNC: 201 MG/DL (ref 100–199)
CO2 SERPL-SCNC: 29 MMOL/L (ref 20–33)
CO2 SERPL-SCNC: 30 MMOL/L (ref 20–33)
CREAT SERPL-MCNC: 0.53 MG/DL (ref 0.5–1.4)
CREAT SERPL-MCNC: 0.6 MG/DL (ref 0.5–1.4)
EOSINOPHIL # BLD AUTO: 0.07 K/UL (ref 0–0.51)
EOSINOPHIL # BLD AUTO: 0.08 K/UL (ref 0–0.51)
EOSINOPHIL NFR BLD: 1.4 % (ref 0–6.9)
EOSINOPHIL NFR BLD: 1.6 % (ref 0–6.9)
ERYTHROCYTE [DISTWIDTH] IN BLOOD BY AUTOMATED COUNT: 41.8 FL (ref 35.9–50)
ERYTHROCYTE [DISTWIDTH] IN BLOOD BY AUTOMATED COUNT: 43.2 FL (ref 35.9–50)
GLOBULIN SER CALC-MCNC: 3.2 G/DL (ref 1.9–3.5)
GLOBULIN SER CALC-MCNC: 3.4 G/DL (ref 1.9–3.5)
GLUCOSE SERPL-MCNC: 89 MG/DL (ref 65–99)
GLUCOSE SERPL-MCNC: 89 MG/DL (ref 65–99)
HCT VFR BLD AUTO: 38.5 % (ref 37–47)
HCT VFR BLD AUTO: 39.1 % (ref 37–47)
HDLC SERPL-MCNC: 61 MG/DL
HGB BLD-MCNC: 12.4 G/DL (ref 12–16)
HGB BLD-MCNC: 12.4 G/DL (ref 12–16)
IMM GRANULOCYTES # BLD AUTO: 0 K/UL (ref 0–0.11)
IMM GRANULOCYTES # BLD AUTO: 0.01 K/UL (ref 0–0.11)
IMM GRANULOCYTES NFR BLD AUTO: 0 % (ref 0–0.9)
IMM GRANULOCYTES NFR BLD AUTO: 0.2 % (ref 0–0.9)
LDLC SERPL CALC-MCNC: 130 MG/DL
LYMPHOCYTES # BLD AUTO: 2.4 K/UL (ref 1–4.8)
LYMPHOCYTES # BLD AUTO: 2.44 K/UL (ref 1–4.8)
LYMPHOCYTES NFR BLD: 48.8 % (ref 22–41)
LYMPHOCYTES NFR BLD: 48.9 % (ref 22–41)
MCH RBC QN AUTO: 29.7 PG (ref 27–33)
MCH RBC QN AUTO: 29.7 PG (ref 27–33)
MCHC RBC AUTO-ENTMCNC: 31.7 G/DL (ref 33.6–35)
MCHC RBC AUTO-ENTMCNC: 32.2 G/DL (ref 33.6–35)
MCV RBC AUTO: 92.1 FL (ref 81.4–97.8)
MCV RBC AUTO: 93.5 FL (ref 81.4–97.8)
MONOCYTES # BLD AUTO: 0.37 K/UL (ref 0–0.85)
MONOCYTES # BLD AUTO: 0.39 K/UL (ref 0–0.85)
MONOCYTES NFR BLD AUTO: 7.5 % (ref 0–13.4)
MONOCYTES NFR BLD AUTO: 7.8 % (ref 0–13.4)
NEUTROPHILS # BLD AUTO: 1.99 K/UL (ref 2–7.15)
NEUTROPHILS # BLD AUTO: 2.01 K/UL (ref 2–7.15)
NEUTROPHILS NFR BLD: 40.3 % (ref 44–72)
NEUTROPHILS NFR BLD: 40.5 % (ref 44–72)
NRBC # BLD AUTO: 0 K/UL
NRBC # BLD AUTO: 0 K/UL
NRBC BLD-RTO: 0 /100 WBC
NRBC BLD-RTO: 0 /100 WBC
PLATELET # BLD AUTO: 340 K/UL (ref 164–446)
PLATELET # BLD AUTO: 345 K/UL (ref 164–446)
PMV BLD AUTO: 10.5 FL (ref 9–12.9)
PMV BLD AUTO: 9.9 FL (ref 9–12.9)
POTASSIUM SERPL-SCNC: 4.4 MMOL/L (ref 3.6–5.5)
POTASSIUM SERPL-SCNC: 4.4 MMOL/L (ref 3.6–5.5)
PROT SERPL-MCNC: 7.3 G/DL (ref 6–8.2)
PROT SERPL-MCNC: 7.5 G/DL (ref 6–8.2)
RBC # BLD AUTO: 4.18 M/UL (ref 4.2–5.4)
RBC # BLD AUTO: 4.18 M/UL (ref 4.2–5.4)
SODIUM SERPL-SCNC: 138 MMOL/L (ref 135–145)
SODIUM SERPL-SCNC: 140 MMOL/L (ref 135–145)
TRIGL SERPL-MCNC: 52 MG/DL (ref 0–149)
TSH SERPL DL<=0.005 MIU/L-ACNC: 0.93 UIU/ML (ref 0.38–5.33)
WBC # BLD AUTO: 4.9 K/UL (ref 4.8–10.8)
WBC # BLD AUTO: 5 K/UL (ref 4.8–10.8)

## 2018-06-04 PROCEDURE — 85025 COMPLETE CBC W/AUTO DIFF WBC: CPT

## 2018-06-04 PROCEDURE — 80053 COMPREHEN METABOLIC PANEL: CPT

## 2018-06-04 PROCEDURE — 36415 COLL VENOUS BLD VENIPUNCTURE: CPT

## 2018-06-04 PROCEDURE — 85025 COMPLETE CBC W/AUTO DIFF WBC: CPT | Mod: GA,91

## 2018-06-04 PROCEDURE — 99153 MOD SED SAME PHYS/QHP EA: CPT

## 2018-06-04 PROCEDURE — 82306 VITAMIN D 25 HYDROXY: CPT | Mod: GA

## 2018-06-04 PROCEDURE — A9579 GAD-BASE MR CONTRAST NOS,1ML: HCPCS | Performed by: FAMILY MEDICINE

## 2018-06-04 PROCEDURE — 80061 LIPID PANEL: CPT

## 2018-06-04 PROCEDURE — 80053 COMPREHEN METABOLIC PANEL: CPT | Mod: 91

## 2018-06-04 PROCEDURE — 84443 ASSAY THYROID STIM HORMONE: CPT

## 2018-06-04 PROCEDURE — 700117 HCHG RX CONTRAST REV CODE 255: Performed by: FAMILY MEDICINE

## 2018-06-04 RX ORDER — MIDAZOLAM HYDROCHLORIDE 1 MG/ML
INJECTION INTRAMUSCULAR; INTRAVENOUS
Status: DISPENSED
Start: 2018-06-04 | End: 2018-06-04

## 2018-06-04 RX ADMIN — GADODIAMIDE 13 ML: 287 INJECTION INTRAVENOUS at 11:29

## 2018-06-04 ASSESSMENT — PAIN SCALES - GENERAL
PAINLEVEL_OUTOF10: 0
PAINLEVEL_OUTOF10: 0
PAINLEVEL_OUTOF10: 3
PAINLEVEL_OUTOF10: 0

## 2018-06-04 NOTE — DISCHARGE INSTRUCTIONS
MRI ADULT DISCHARGE INSTRUCTIONS    You have been medicated today for your scan. Please follow the instructions below to ensure your safe recovery. If you have any questions or problems, feel free to call us at [phone] or [phone].     1.   Have someone stay with you to assist you as needed.    2.   Do not drive or operate any mechanical devices.    3.   Do not perform any activity that requires concentration. Make no major decisions over the next 24 hours.     4.   Be careful changing positions from laying down to sitting or standing, as you may become dizzy.     5.   Do not drink alcohol for 48 hours.    6.   There are no restrictions for eating your normal meals. Drink fluids.    7.   You may continue your usual medications for pain, tranquilizers, muscle relaxants or sedatives when awake.     8.   Tomorrow, you may continue your normal daily activities.     9.   Pressure dressing on 10 - 15 minutes. If swelling or bleeding occurs when removed, continue placing direct pressure on injection site for another 5 minutes, or until bleeding stops.   Midazolam (VERSED)  What is this medicine?  You were given MIDAZOLAM (SEAN villareal) for your procedure today. This medication is a benzodiazepine. It is used to cause relaxation or sleep before surgery and to block the memory of the procedure.  This medicine may be used for other purposes; ask your health care provider or pharmacist if you have questions.  What side effects may I notice from receiving this medicine?  Side effects that you should report to your doctor or health care professional as soon as possible:  • allergic reactions like skin rash, itching or hives, swelling of the face, lips, or tongue  • breathing problems  • confusion  • dizziness or lightheadedness  • fast, irregular heartbeat  • halluninations during recovery  • numbness or tingling in the hands or feet  • pain, redness, or swelling at site where injected  • seizures  Side effects that usually  do not require medical attention (report to your doctor or health care professional if they continue or are bothersome):  • coughing  • headache  • hiccups  • involuntary eye and muscle movements  • loss of memory of events just before, during, and after use  • nausea, vomiting  • speech problems  • tiredness  • trouble sleeping or nightmares  This list may not describe all possible side effects. Call your doctor for medical advice about side effects. You may report side effects to FDA at 3-514-SAT-8379.    Fentanyl  What is this medicine?  You were given FENTANYL (FEN ta nil) for your procedure today, it is a pain reliever. It is used to treat breakthrough pain that your long acting pain medicine does not control. Do not use this medicine for a pain that will go away in a few days like pain from surgery, doctor or dentist visits.   This medicine may be used for other purposes; ask your health care provider or pharmacist if you have questions.  What side effects may I notice from receiving this medicine?  Side effects that you should report to your doctor or health care professional as soon as possible:  • allergic reactions like skin rash, itching or hives, swelling of the face, lips, or tongue  • breathing problems  • changes in vision  • confusion  • dry mouth  • feeling faint, lightheaded  • hallucination  • irregular heartbeat  • mouth pain, sores  • problems with balance, talking, walking  • trouble passing urine or change in the amount of urine  • unusual bleeding or bruising  • unusually weak or tired  Side effects that usually do not require medical attention (report to your doctor or health care professional if they continue or are bothersome):  • dizzy  • headache  • loss of appetite  • nausea, vomiting  • sweating  • tingling in mouth  This list may not describe all possible side effects. Call your doctor for medical advice about side effects. You may report side effects to FDA at 3-198-FDA-7415.    I  have been informed of and understand the above discharge instructions.

## 2018-06-05 ENCOUNTER — TELEPHONE (OUTPATIENT)
Dept: MEDICAL GROUP | Facility: PHYSICIAN GROUP | Age: 58
End: 2018-06-05

## 2018-06-05 NOTE — PROGRESS NOTES
Please let the patient know that the labs look good. Cholesterol is slight worse, thyroid test, complete blood count, electrolytes, kidney function are unremarkable, We can discuss at her next appointment. Thank you  Samanta Fournier M.D.

## 2018-06-05 NOTE — TELEPHONE ENCOUNTER
----- Message from Samanta Fournier M.D. sent at 6/5/2018  8:05 AM PDT -----  Please let the patient know that the labs look good. Cholesterol is slight worse, thyroid test, complete blood count, electrolytes, kidney function are unremarkable, We can discuss at her next appointment. Thank you  Samanta Fournier M.D.

## 2018-06-24 DIAGNOSIS — I10 ESSENTIAL HYPERTENSION: ICD-10-CM

## 2018-06-25 RX ORDER — ENALAPRIL MALEATE 20 MG/1
TABLET ORAL
Qty: 90 TAB | Refills: 3 | Status: SHIPPED | OUTPATIENT
Start: 2018-06-25 | End: 2018-10-24 | Stop reason: SDUPTHER

## 2018-08-16 ENCOUNTER — TELEPHONE (OUTPATIENT)
Dept: MEDICAL GROUP | Facility: PHYSICIAN GROUP | Age: 58
End: 2018-08-16

## 2018-09-27 ENCOUNTER — NON-PROVIDER VISIT (OUTPATIENT)
Dept: OCCUPATIONAL MEDICINE | Facility: CLINIC | Age: 58
End: 2018-09-27

## 2018-09-27 DIAGNOSIS — Z02.83 ENCOUNTER FOR DRUG SCREENING: ICD-10-CM

## 2018-09-27 PROCEDURE — 80305 DRUG TEST PRSMV DIR OPT OBS: CPT | Performed by: INTERNAL MEDICINE

## 2018-09-27 PROCEDURE — 82075 ASSAY OF BREATH ETHANOL: CPT | Performed by: INTERNAL MEDICINE

## 2018-10-09 ENCOUNTER — NON-PROVIDER VISIT (OUTPATIENT)
Dept: OCCUPATIONAL MEDICINE | Facility: CLINIC | Age: 58
End: 2018-10-09

## 2018-10-09 PROCEDURE — 8911 PR MRO FEE: Performed by: INTERNAL MEDICINE

## 2018-10-17 LAB
AMP AMPHETAMINE: NORMAL
BAR BARBITURATES: NORMAL
BREATH ALCOHOL COMMENT: NORMAL
BZO BENZODIAZEPINES: NORMAL
COC COCAINE: NORMAL
INT CON NEG: NEGATIVE
INT CON POS: POSITIVE
MDMA ECSTASY: NORMAL
MET METHAMPHETAMINES: NORMAL
MTD METHADONE: NORMAL
OPI OPIATES: NORMAL
OXY OXYCODONE: NORMAL
PCP PHENCYCLIDINE: NORMAL
POC BREATHALIZER: 0 PERCENT (ref 0–0.01)
POC URINE DRUG SCREEN OCDRS: NORMAL
THC: NORMAL

## 2018-10-19 ENCOUNTER — HOSPITAL ENCOUNTER (OUTPATIENT)
Dept: RADIOLOGY | Facility: MEDICAL CENTER | Age: 58
End: 2018-10-19
Attending: NURSE PRACTITIONER
Payer: COMMERCIAL

## 2018-10-19 ENCOUNTER — OFFICE VISIT (OUTPATIENT)
Dept: URGENT CARE | Facility: PHYSICIAN GROUP | Age: 58
End: 2018-10-19
Payer: COMMERCIAL

## 2018-10-19 ENCOUNTER — APPOINTMENT (OUTPATIENT)
Dept: RADIOLOGY | Facility: MEDICAL CENTER | Age: 58
End: 2018-10-19
Attending: EMERGENCY MEDICINE
Payer: COMMERCIAL

## 2018-10-19 ENCOUNTER — HOSPITAL ENCOUNTER (EMERGENCY)
Facility: MEDICAL CENTER | Age: 58
End: 2018-10-20
Attending: EMERGENCY MEDICINE
Payer: COMMERCIAL

## 2018-10-19 VITALS
TEMPERATURE: 98 F | HEART RATE: 79 BPM | SYSTOLIC BLOOD PRESSURE: 160 MMHG | DIASTOLIC BLOOD PRESSURE: 98 MMHG | RESPIRATION RATE: 14 BRPM | HEIGHT: 64 IN | BODY MASS INDEX: 22.09 KG/M2 | OXYGEN SATURATION: 98 % | WEIGHT: 129.4 LBS

## 2018-10-19 DIAGNOSIS — S00.83XA CONTUSION OF FACE, INITIAL ENCOUNTER: ICD-10-CM

## 2018-10-19 DIAGNOSIS — S27.321A CONTUSION OF RIGHT LUNG, INITIAL ENCOUNTER: ICD-10-CM

## 2018-10-19 DIAGNOSIS — R07.89 ANTERIOR CHEST WALL PAIN: ICD-10-CM
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
## 2018-10-19 DIAGNOSIS — S20.211A CONTUSION OF RIGHT CHEST WALL, INITIAL ENCOUNTER: ICD-10-CM
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
## 2018-10-19 DIAGNOSIS — T07.XXXA MULTIPLE ABRASIONS: ICD-10-CM

## 2018-10-19 PROCEDURE — 99284 EMERGENCY DEPT VISIT MOD MDM: CPT

## 2018-10-19 PROCEDURE — 99214 OFFICE O/P EST MOD 30 MIN: CPT | Performed by: NURSE PRACTITIONER

## 2018-10-19 PROCEDURE — 71046 X-RAY EXAM CHEST 2 VIEWS: CPT

## 2018-10-19 RX ORDER — HYDROXYZINE HYDROCHLORIDE 25 MG/1
TABLET, FILM COATED ORAL
Refills: 0 | COMMUNITY
Start: 2018-07-23 | End: 2019-05-13

## 2018-10-19 RX ORDER — OXYCODONE 9 MG/1
CAPSULE, EXTENDED RELEASE ORAL
Status: ON HOLD | COMMUNITY
Start: 2018-08-07 | End: 2019-06-06

## 2018-10-19 RX ORDER — OXYCODONE HYDROCHLORIDE 10 MG/1
TABLET ORAL
COMMUNITY
Start: 2018-07-30 | End: 2019-05-13

## 2018-10-19 RX ORDER — ESCITALOPRAM OXALATE 10 MG/1
TABLET ORAL
Refills: 0 | COMMUNITY
Start: 2018-07-23 | End: 2019-06-11

## 2018-10-19 ASSESSMENT — ENCOUNTER SYMPTOMS
NECK PAIN: 1
SORE THROAT: 0
GASTROINTESTINAL NEGATIVE: 1
STRIDOR: 0
SHORTNESS OF BREATH: 1
BACK PAIN: 1
CONSTITUTIONAL NEGATIVE: 1
ROS SKIN COMMENTS: ABRASIONS
SINUS PAIN: 0
NEUROLOGICAL NEGATIVE: 1

## 2018-10-19 ASSESSMENT — PAIN SCALES - GENERAL: PAINLEVEL_OUTOF10: 9

## 2018-10-20 VITALS
SYSTOLIC BLOOD PRESSURE: 108 MMHG | OXYGEN SATURATION: 99 % | DIASTOLIC BLOOD PRESSURE: 77 MMHG | RESPIRATION RATE: 15 BRPM | HEART RATE: 63 BPM | BODY MASS INDEX: 22.06 KG/M2 | TEMPERATURE: 98.4 F | WEIGHT: 129.19 LBS | HEIGHT: 64 IN

## 2018-10-20 PROCEDURE — 70486 CT MAXILLOFACIAL W/O DYE: CPT

## 2018-10-20 PROCEDURE — 700111 HCHG RX REV CODE 636 W/ 250 OVERRIDE (IP): Performed by: EMERGENCY MEDICINE

## 2018-10-20 PROCEDURE — 96372 THER/PROPH/DIAG INJ SC/IM: CPT

## 2018-10-20 RX ORDER — HYDROMORPHONE HYDROCHLORIDE 2 MG/ML
0.5 INJECTION, SOLUTION INTRAMUSCULAR; INTRAVENOUS; SUBCUTANEOUS ONCE
Status: COMPLETED | OUTPATIENT
Start: 2018-10-20 | End: 2018-10-20

## 2018-10-20 RX ADMIN — HYDROMORPHONE HYDROCHLORIDE 0.5 MG: 2 INJECTION INTRAMUSCULAR; INTRAVENOUS; SUBCUTANEOUS at 01:15

## 2018-10-20 ASSESSMENT — PAIN SCALES - GENERAL
PAINLEVEL_OUTOF10: 2
PAINLEVEL_OUTOF10: 2
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
## 2018-10-20 NOTE — ED NOTES
Discharge instructions provided, no additional questions, able to ambulate to lobby with assistance
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
## 2018-10-23 ENCOUNTER — APPOINTMENT (OUTPATIENT)
Dept: MEDICAL GROUP | Facility: PHYSICIAN GROUP | Age: 58
End: 2018-10-23
Payer: OTHER GOVERNMENT

## 2018-10-24 ENCOUNTER — OFFICE VISIT (OUTPATIENT)
Dept: MEDICAL GROUP | Facility: PHYSICIAN GROUP | Age: 58
End: 2018-10-24
Payer: COMMERCIAL

## 2018-10-24 ENCOUNTER — OFFICE VISIT (OUTPATIENT)
Dept: INTERNAL MEDICINE | Facility: MEDICAL CENTER | Age: 58
End: 2018-10-24
Payer: COMMERCIAL

## 2018-10-24 VITALS
DIASTOLIC BLOOD PRESSURE: 91 MMHG | OXYGEN SATURATION: 96 % | WEIGHT: 134 LBS | RESPIRATION RATE: 16 BRPM | BODY MASS INDEX: 22.88 KG/M2 | HEIGHT: 64 IN | TEMPERATURE: 98.7 F | SYSTOLIC BLOOD PRESSURE: 161 MMHG | HEART RATE: 78 BPM

## 2018-10-24 VITALS
OXYGEN SATURATION: 99 % | TEMPERATURE: 97.9 F | DIASTOLIC BLOOD PRESSURE: 84 MMHG | RESPIRATION RATE: 14 BRPM | WEIGHT: 135 LBS | HEART RATE: 74 BPM | HEIGHT: 64 IN | BODY MASS INDEX: 23.05 KG/M2 | SYSTOLIC BLOOD PRESSURE: 138 MMHG

## 2018-10-24 DIAGNOSIS — I10 ESSENTIAL HYPERTENSION: ICD-10-CM

## 2018-10-24 DIAGNOSIS — E87.6 HYPOKALEMIA: ICD-10-CM

## 2018-10-24 DIAGNOSIS — E78.5 DYSLIPIDEMIA: ICD-10-CM

## 2018-10-24 DIAGNOSIS — R06.02 SOB (SHORTNESS OF BREATH) ON EXERTION: ICD-10-CM

## 2018-10-24 PROCEDURE — 99213 OFFICE O/P EST LOW 20 MIN: CPT | Mod: GE | Performed by: INTERNAL MEDICINE

## 2018-10-24 PROCEDURE — 99214 OFFICE O/P EST MOD 30 MIN: CPT | Performed by: NURSE PRACTITIONER

## 2018-10-24 RX ORDER — ALBUTEROL SULFATE 90 UG/1
2 AEROSOL, METERED RESPIRATORY (INHALATION) EVERY 6 HOURS PRN
Qty: 8.5 G | Refills: 2 | Status: SHIPPED | OUTPATIENT
Start: 2018-10-24 | End: 2019-07-09

## 2018-10-24 RX ORDER — ENALAPRIL MALEATE 20 MG/1
20 TABLET ORAL
Qty: 30 TAB | Refills: 0 | Status: SHIPPED | OUTPATIENT
Start: 2018-10-24 | End: 2019-02-04 | Stop reason: SDUPTHER

## 2018-10-24 RX ORDER — SIMVASTATIN 20 MG
20 TABLET ORAL EVERY EVENING
Qty: 30 TAB | Refills: 0 | Status: SHIPPED | OUTPATIENT
Start: 2018-10-24 | End: 2018-12-11 | Stop reason: SDUPTHER

## 2018-10-24 RX ORDER — POTASSIUM CHLORIDE 20 MEQ/1
20 TABLET, EXTENDED RELEASE ORAL
Qty: 30 TAB | Refills: 0 | Status: SHIPPED | OUTPATIENT
Start: 2018-10-24 | End: 2019-06-11

## 2018-10-24 ASSESSMENT — PATIENT HEALTH QUESTIONNAIRE - PHQ9
9. THOUGHTS THAT YOU WOULD BE BETTER OFF DEAD, OR OF HURTING YOURSELF: NOT AT ALL
6. FEELING BAD ABOUT YOURSELF - OR THAT YOU ARE A FAILURE OR HAVE LET YOURSELF OR YOUR FAMILY DOWN: NOT AL ALL
4. FEELING TIRED OR HAVING LITTLE ENERGY: NOT AT ALL
SUM OF ALL RESPONSES TO PHQ QUESTIONS 1-9: 7
2. FEELING DOWN, DEPRESSED, IRRITABLE, OR HOPELESS: NEARLY EVERY DAY
1. LITTLE INTEREST OR PLEASURE IN DOING THINGS: NOT AT ALL
5. POOR APPETITE OR OVEREATING: NOT AT ALL
SUM OF ALL RESPONSES TO PHQ9 QUESTIONS 1 AND 2: 3
3. TROUBLE FALLING OR STAYING ASLEEP OR SLEEPING TOO MUCH: NEARLY EVERY DAY
8. MOVING OR SPEAKING SO SLOWLY THAT OTHER PEOPLE COULD HAVE NOTICED. OR THE OPPOSITE, BEING SO FIGETY OR RESTLESS THAT YOU HAVE BEEN MOVING AROUND A LOT MORE THAN USUAL: NOT AT ALL
7. TROUBLE CONCENTRATING ON THINGS, SUCH AS READING THE NEWSPAPER OR WATCHING TELEVISION: SEVERAL DAYS

## 2018-10-24 ASSESSMENT — LIFESTYLE VARIABLES: SUBSTANCE_ABUSE: 0

## 2018-10-24 ASSESSMENT — ENCOUNTER SYMPTOMS
NAUSEA: 0
BACK PAIN: 1
FOCAL WEAKNESS: 0
COUGH: 1
SHORTNESS OF BREATH: 0
MYALGIAS: 1
SORE THROAT: 0
LOSS OF CONSCIOUSNESS: 0
POLYDIPSIA: 0
SEIZURES: 0
BRUISES/BLEEDS EASILY: 0
EYE DISCHARGE: 0
WHEEZING: 1
HEMOPTYSIS: 0
EYE REDNESS: 0
STRIDOR: 0
ABDOMINAL PAIN: 0
CHILLS: 0
FLANK PAIN: 0
DOUBLE VISION: 0
PALPITATIONS: 0
VOMITING: 0
FEVER: 0
BLURRED VISION: 0
DIZZINESS: 0

## 2018-10-24 ASSESSMENT — PAIN SCALES - GENERAL: PAINLEVEL: 9=SEVERE PAIN
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
## 2018-10-24 NOTE — ASSESSMENT & PLAN NOTE
Here for bp med refill 138/84.  Enalipril reordered.  Has appointment with PCP next week for further follow up.

## 2018-10-24 NOTE — PROGRESS NOTES
Chief Complaint   Patient presents with   • Hypertension   • Medication Refill       Subjective:   Elina Skaggs is a 58 y.o. female here today for evaluation and management of:    HTN (hypertension)  Here for bp med refill 138/84.  Enalipril reordered.  Has appointment with PCP next week for further follow up.     Dyslipidemia  Here for med refill today.  Refill provided.  Has appointment with PCP next week.           Current medicines (including changes today)  Current Outpatient Prescriptions   Medication Sig Dispense Refill   • potassium chloride SA (KDUR) 20 MEQ Tab CR Take 1 Tab by mouth every day. 30 Tab 0   • enalapril (VASOTEC) 20 MG tablet Take 1 Tab by mouth every day. 30 Tab 0   • simvastatin (ZOCOR) 20 MG Tab Take 1 Tab by mouth every evening. 30 Tab 0   • escitalopram (LEXAPRO) 10 MG Tab TK 1 T PO QD  0   • hydrOXYzine HCl (ATARAX) 25 MG Tab TK 1 T PO QD  0   • oxyCODONE immediate release (ROXICODONE) 10 MG immediate release tablet      • XTAMPZA ER 9 MG Capsule Extended Release 12 hour Abuse-Deterrent      • gabapentin (NEURONTIN) 800 MG tablet Take 800 mg by mouth 3 times a day.     • oxycodone-acetaminophen (PERCOCET) 5-325 MG Tab Take 1-2 Tabs by mouth every four hours as needed. 30 Tab 0   • promethazine (PHENERGAN) 25 MG Tab TAKE 1 TABLET BY MOUTH TWICE A DAY  0   • oxycodone (OXY-IR) 15 MG immediate release tablet Take 15 mg by mouth every four hours as needed for Severe Pain (up to 5 tabs per day).     • tizanidine (ZANAFLEX) 4 MG TABS Take 4 mg by mouth 3 times a day.     • lorazepam (ATIVAN) 1 MG TABS Take 0.5 mg by mouth every four hours as needed for Anxiety.     • zolpidem (AMBIEN) 5 MG TABS Take 5 mg by mouth at bedtime as needed for Sleep.       No current facility-administered medications for this visit.      She  has a past medical history of Anxiety (1993); Chronic back pain; Constipation (9/27/2013); Dental disorder; Hypertension; Impaired physical mobility (9/30/2013); MEDICAL HOME  "(6/2015); Psychiatric problem; Sleep apnea; and Snoring.    ROS as stated in hpi  No chest pain, no shortness of breath, no abdominal pain       Objective:     Blood pressure 138/84, pulse 74, temperature 36.6 °C (97.9 °F), temperature source Temporal, resp. rate 14, height 1.626 m (5' 4\"), weight 61.2 kg (135 lb), SpO2 99 %, not currently breastfeeding. Body mass index is 23.17 kg/m².   Physical Exam:  Constitutional: Alert, no distress.  Skin: Warm, dry, good turgor,no cyanosis, no rashes in visible areas.  Eye: Equal, round and reactive, conjunctiva clear, lids normal.  Ears: No tenderness, no discharge.  External canals are without any significant edema or erythema.   Gross auditory acuity is intact.  Nose: symmetrical without tenderness, no discharge.  Mouth/Throat: lips without lesion.  Oropharynx clear.  Neck: Trachea midline, no masses, no obvious thyroid enlargement. Range of motion within normal limits.  Neuro: Cranial nerves 2-12 grossly intact.  No sensory deficit.  Psych: Alert and oriented x3, normal affect and mood and judgement. Unusual affect.  On oxycodone and speech is a bit slurred        Assessment and Plan:   The following treatment plan was discussed    1. Hypokalemia  This is a new problem to me.  Chronic, ongoing. Needs refills.  No updated labs in file.  Has appointment with PCP next week.   - potassium chloride SA (KDUR) 20 MEQ Tab CR; Take 1 Tab by mouth every day.  Dispense: 30 Tab; Refill: 0    2. Essential hypertension  This is a new problem to me.  Chronic, ongoing.  /84.  Has been out of medication for several weeks.  Has appointment with PCP next week. Refill provided.   - enalapril (VASOTEC) 20 MG tablet; Take 1 Tab by mouth every day.  Dispense: 30 Tab; Refill: 0    3. Dyslipidemia  This is a new problem to me.  Chronic, ongoing.  No updated labs in chart.  Refill provided on simvastatin.  Has appointment with PCP, Dr. Hunt next week.   - simvastatin (ZOCOR) 20 MG Tab; Take " 1 Tab by mouth every evening.  Dispense: 30 Tab; Refill: 0      Followup: Return in about 7 days (around 10/31/2018) for pcp follow up appointment.         Educated in proper administration of medication(s) ordered today including safety, possible SE, risks, benefits, rationale and alternatives to therapy.     Please note that this dictation was created using voice recognition software. I have made every reasonable attempt to correct obvious errors, but I expect that there are errors of grammar and possibly content that I did not discover before finalizing the note.

## 2018-10-26 ENCOUNTER — OFFICE VISIT (OUTPATIENT)
Dept: URGENT CARE | Facility: PHYSICIAN GROUP | Age: 58
End: 2018-10-26
Payer: COMMERCIAL

## 2018-10-26 VITALS
HEART RATE: 84 BPM | TEMPERATURE: 97.9 F | SYSTOLIC BLOOD PRESSURE: 110 MMHG | BODY MASS INDEX: 22.88 KG/M2 | HEIGHT: 64 IN | WEIGHT: 134 LBS | RESPIRATION RATE: 14 BRPM | OXYGEN SATURATION: 96 % | DIASTOLIC BLOOD PRESSURE: 60 MMHG
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
## 2018-10-26 DIAGNOSIS — S16.1XXD STRAIN OF NECK MUSCLE, SUBSEQUENT ENCOUNTER: ICD-10-CM

## 2018-10-26 DIAGNOSIS — M54.9 BACK PAIN DUE TO INJURY: ICD-10-CM

## 2018-10-26 PROCEDURE — 99213 OFFICE O/P EST LOW 20 MIN: CPT | Performed by: NURSE PRACTITIONER

## 2018-10-26 ASSESSMENT — ENCOUNTER SYMPTOMS
NECK PAIN: 1
HEADACHES: 0
CHILLS: 0
BACK PAIN: 1
TINGLING: 0
FEVER: 0
SENSORY CHANGE: 0

## 2018-10-26 NOTE — LETTER
October 26, 2018        Elina Skaggs  [address] [place] [id number]        Ms. Skaggs was seen in our clinic today and she is cleared to return to work on Monday, Oct. 29th. Thank you  If you have any questions or concerns, please don't hesitate to call.        Sincerely,        FATOU Liagn.P.JULIÁN.    Electronically Signed
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
## 2018-10-31 ENCOUNTER — OFFICE VISIT (OUTPATIENT)
Dept: URGENT CARE | Facility: PHYSICIAN GROUP | Age: 58
End: 2018-10-31
Payer: COMMERCIAL

## 2018-10-31 VITALS
SYSTOLIC BLOOD PRESSURE: 140 MMHG | OXYGEN SATURATION: 97 % | DIASTOLIC BLOOD PRESSURE: 88 MMHG | BODY MASS INDEX: 22.88 KG/M2 | HEIGHT: 64 IN | WEIGHT: 134 LBS | RESPIRATION RATE: 14 BRPM | HEART RATE: 71 BPM | TEMPERATURE: 97.9 F

## 2018-10-31 DIAGNOSIS — S20.219A CONTUSION OF CHEST WALL, UNSPECIFIED LATERALITY, INITIAL ENCOUNTER: ICD-10-CM

## 2018-10-31 PROCEDURE — 99213 OFFICE O/P EST LOW 20 MIN: CPT | Performed by: PHYSICIAN ASSISTANT

## 2018-10-31 ASSESSMENT — ENCOUNTER SYMPTOMS
CHILLS: 0
ABDOMINAL PAIN: 0
NAUSEA: 0
SHORTNESS OF BREATH: 0
VOMITING: 0
FEVER: 0
PALPITATIONS: 0
WHEEZING: 0
SPUTUM PRODUCTION: 0
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
## 2018-11-06 ENCOUNTER — OFFICE VISIT (OUTPATIENT)
Dept: MEDICAL GROUP | Facility: PHYSICIAN GROUP | Age: 58
End: 2018-11-06
Payer: COMMERCIAL

## 2018-11-06 VITALS
HEIGHT: 64 IN | TEMPERATURE: 97.7 F | HEART RATE: 77 BPM | BODY MASS INDEX: 23.22 KG/M2 | DIASTOLIC BLOOD PRESSURE: 88 MMHG | WEIGHT: 136 LBS | SYSTOLIC BLOOD PRESSURE: 130 MMHG | OXYGEN SATURATION: 97 % | RESPIRATION RATE: 14 BRPM

## 2018-11-06 DIAGNOSIS — S29.011A MUSCLE STRAIN OF CHEST WALL, INITIAL ENCOUNTER: ICD-10-CM

## 2018-11-06 DIAGNOSIS — F17.210 CIGARETTE NICOTINE DEPENDENCE WITHOUT COMPLICATION: ICD-10-CM

## 2018-11-06 DIAGNOSIS — Z23 NEED FOR VACCINATION: ICD-10-CM

## 2018-11-06 PROCEDURE — 90471 IMMUNIZATION ADMIN: CPT | Performed by: PHYSICIAN ASSISTANT

## 2018-11-06 PROCEDURE — 99214 OFFICE O/P EST MOD 30 MIN: CPT | Mod: 25 | Performed by: PHYSICIAN ASSISTANT

## 2018-11-06 PROCEDURE — 90686 IIV4 VACC NO PRSV 0.5 ML IM: CPT | Performed by: PHYSICIAN ASSISTANT

## 2018-11-06 RX ORDER — NICOTINE 21 MG/24HR
1 PATCH, TRANSDERMAL 24 HOURS TRANSDERMAL EVERY 24 HOURS
Qty: 14 PATCH | Refills: 0 | Status: SHIPPED | OUTPATIENT
Start: 2018-11-06 | End: 2019-02-04 | Stop reason: SDUPTHER

## 2018-11-06 RX ORDER — NICOTINE 21 MG/24HR
1 PATCH, TRANSDERMAL 24 HOURS TRANSDERMAL EVERY 24 HOURS
Qty: 42 PATCH | Refills: 0 | Status: SHIPPED | OUTPATIENT
Start: 2018-11-06 | End: 2019-02-04 | Stop reason: SDUPTHER
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
## 2018-11-07 NOTE — ASSESSMENT & PLAN NOTE
Smoker for about 37 years. Has tried to quit in the past by using Chantix but had no success.  No hx seizure or eating disorder. Currently smokes 8 cigarettes per day.  First cigarette is within 30 minutes of waking.

## 2018-12-04 DIAGNOSIS — E87.6 HYPOKALEMIA: ICD-10-CM

## 2018-12-04 RX ORDER — POTASSIUM CHLORIDE 20 MEQ/1
TABLET, EXTENDED RELEASE ORAL
Qty: 30 TAB | Refills: 0 | OUTPATIENT
Start: 2018-12-04

## 2019-02-04 ENCOUNTER — OFFICE VISIT (OUTPATIENT)
Dept: MEDICAL GROUP | Facility: PHYSICIAN GROUP | Age: 59
End: 2019-02-04
Payer: COMMERCIAL

## 2019-02-04 VITALS
WEIGHT: 135.14 LBS | BODY MASS INDEX: 23.07 KG/M2 | OXYGEN SATURATION: 96 % | TEMPERATURE: 98.3 F | HEIGHT: 64 IN | HEART RATE: 89 BPM | DIASTOLIC BLOOD PRESSURE: 80 MMHG | SYSTOLIC BLOOD PRESSURE: 150 MMHG

## 2019-02-04 DIAGNOSIS — F17.210 CIGARETTE NICOTINE DEPENDENCE WITHOUT COMPLICATION: ICD-10-CM

## 2019-02-04 DIAGNOSIS — E55.9 VITAMIN D DEFICIENCY: ICD-10-CM

## 2019-02-04 DIAGNOSIS — J45.990 EXERCISE INDUCED BRONCHOSPASM: ICD-10-CM

## 2019-02-04 DIAGNOSIS — F32.A ANXIETY AND DEPRESSION: ICD-10-CM

## 2019-02-04 DIAGNOSIS — G89.4 CHRONIC PAIN SYNDROME: ICD-10-CM

## 2019-02-04 DIAGNOSIS — E78.5 DYSLIPIDEMIA: ICD-10-CM

## 2019-02-04 DIAGNOSIS — F41.9 ANXIETY AND DEPRESSION: ICD-10-CM

## 2019-02-04 DIAGNOSIS — I10 ESSENTIAL HYPERTENSION: ICD-10-CM

## 2019-02-04 PROCEDURE — 99214 OFFICE O/P EST MOD 30 MIN: CPT | Performed by: FAMILY MEDICINE

## 2019-02-04 RX ORDER — SIMVASTATIN 20 MG
20 TABLET ORAL
Qty: 90 TAB | Refills: 1 | Status: SHIPPED | OUTPATIENT
Start: 2019-02-04 | End: 2019-02-12 | Stop reason: SDUPTHER

## 2019-02-04 RX ORDER — NICOTINE 21 MG/24HR
1 PATCH, TRANSDERMAL 24 HOURS TRANSDERMAL EVERY 24 HOURS
Qty: 42 PATCH | Refills: 0 | Status: SHIPPED | OUTPATIENT
Start: 2019-02-04 | End: 2019-02-12 | Stop reason: SDUPTHER

## 2019-02-04 RX ORDER — ENALAPRIL MALEATE 20 MG/1
20 TABLET ORAL
Qty: 90 TAB | Refills: 1 | Status: SHIPPED | OUTPATIENT
Start: 2019-02-04 | End: 2019-02-12 | Stop reason: SDUPTHER

## 2019-02-04 RX ORDER — NICOTINE 21 MG/24HR
1 PATCH, TRANSDERMAL 24 HOURS TRANSDERMAL EVERY 24 HOURS
Qty: 14 PATCH | Refills: 0 | Status: SHIPPED | OUTPATIENT
Start: 2019-02-04 | End: 2019-02-12 | Stop reason: SDUPTHER

## 2019-02-04 RX ORDER — ALBUTEROL SULFATE 90 UG/1
2 AEROSOL, METERED RESPIRATORY (INHALATION) EVERY 6 HOURS PRN
Qty: 8.5 G | Refills: 2 | Status: SHIPPED | OUTPATIENT
Start: 2019-02-04 | End: 2019-02-21

## 2019-02-04 NOTE — PROGRESS NOTES
Subjective:      Elina Skaggs is a 58 y.o. female who presents with Anxiety        HPI:  The patient is a 58 y.o. Female who presents for multiple medication refills. I have not seen her since August 2015. She has seen multiple other providers since then.     She continues to follow-up with the Dr. Aldrich's office for pain management for the past 4 years for chronic neck pain, chronic back pain, migraine headaches.. She has been prescribing her 10mg oxycodone, which she takes 4 times a day. She also gets hydroxyzine 25mg daily for anxiety, and Tizanidine 4mg once daily for muscle spasm. They have done multiple ablations for the neck and back pain and Botox procedures for her migraine headaches.  They are currently looking into a pain stimulator implant.  According to patient her pain specialist as referred her to psychiatrist and she has an appointment tomorrow at [facility] mental health services on [facility].    She has not quit smoking yet due to going through a divorce in June 2018. She smokes 8 cigarettes daily. She states she never received the Nicoderm prescriptions that Alba Saini PA-C wrote her. Records show they were received by the pharmacy on November, 6th, 2018. She is requesting a new prescription.    In October 2018 she was given a random drug screen at work because someone reported that she had been slurring her speech. The tests were negative, however she states she was humiliated by it and no one treats her the same since then. She works at [employer], but is still receiving disability checks. She is requesting a leave of absence from her job due to her anxiety and depression, which has worsened since the drug screening.  Was previously on Lexapro for both anxiety and depression.  She has run out of the medication a few months ago.  As mentioned above pain specialist put her on hydroxyzine on as-needed basis for anxiety but she said this does not work.  She is asking for  "alprazolam.    Her hypertension is managed with enalapril 20mg daily. Her blood pressure today is 150/80. She ran out of her medication on 1/28/19. She is requesting a refill.    Her dyslipidemia is managed with simvastatin 20mg daily. She ran out of medication 5-6 months ago. She is requesting a refill.  The last lipid panel was in June 2018 that came back LDL increased from , HDL improved at increased from 38-61.    She is requesting a refill on her albuterol inhaler. She was prescribed the inhaler because she becomes short of breath with exertion.  She had bronchospasm from acute URI.  She does not have a history of asthma.    The patient saw Dr. Fournier on 7/17/18 and was prescribed 50,000 units of vitamin D. She took this for 3 months.  She has not taken over-the-counter vitamin D3 as a daily supplement thereafter.    Past medical history, past surgical history, family history reviewed-no changes    Social history reviewed-no changes    Allergies reviewed-no changes    Medications reviewed-no changes      ROS:  As per the HPI as shown above, the rest are negative.       Objective:     /80 (BP Location: Left arm, Patient Position: Sitting, BP Cuff Size: Adult)   Pulse 89   Temp 36.8 °C (98.3 °F) (Temporal)   Ht 1.626 m (5' 4\")   Wt 61.3 kg (135 lb 2.3 oz)   SpO2 96%   BMI 23.20 kg/m²     Physical Exam  Examined alert, awake, oriented, not in distress    Neck-supple, no lymphadenopathy, no thyromegaly  Lungs-clear to auscultation, no rales, no wheezes  Heart-regular rate and rhythm, no murmur  Extremities-no edema, clubbing, cyanosis         Assessment/Plan:   1. Essential hypertension  The blood pressure is elevated at 150/80. She ran out of her medication about 1 week ago. I will refill the patient's enalapril.  Advised the need to be compliant with medication and medical appointment.  I will reevaluate in a month.  - enalapril (VASOTEC) 20 MG tablet; Take 1 Tab by mouth every day.  Dispense: 90 " Tab; Refill: 1    2. Dyslipidemia  I will refill the patient's simvastatin. Continue taking as prescribed. I have ordered blood work to recheck her cholesterol levels.  Most likely the cholesterol panel comes back elevated because she has not been taking the medication for several months.  - simvastatin (ZOCOR) 20 MG Tab; Take 1 Tab by mouth every day. N THE EVENING  Dispense: 90 Tab; Refill: 1  - Lipid Profile; Future  - COMP METABOLIC PANEL; Future    3. Chronic pain syndrome  The patient is being followed by pain management and is receiving multiple prescriptions from them, including narcotic pain meds.. She will continue following up with them.     4. Anxiety and depression  Made her aware that I cannot prescribe alprazolam because of black box warning when taking opiates and benzodiazepines together.  Made aware of 4 times increased risk of death when patient is on both benzodiazepine and opiates because of accidental overdose. She has an appointment with psychiatrist tomorrow, and will talk with him/her about options for treatment of her anxiety and depression. She will also discussed getting FMLA based on increased symptoms of anxiety and depression from stressful work environment.    5. Vitamin D deficiency  We will check vitamin D level.    6. Cigarette nicotine dependence without complication  I have prescribed the patient another set of Nicoderm patches because she claims she never got the prescriptions that were prescribed by Alba Saini PA-C. I warned the patient that she cannot smoke while on the Nicoderm, otherwise she will be ingesting twice the amount of nicotine. I discussed the descending dosage scale. The patient understands how to use the Nicoderm.   - nicotine (NICODERM) 21 MG/24HR PATCH 24 HR; Apply 1 Patch to skin as directed every 24 hours.  Dispense: 42 Patch; Refill: 0  - nicotine (NICODERM) 14 MG/24HR PATCH 24 HR; Apply 1 Patch to skin as directed every 24 hours.  Dispense: 14 Patch;  Refill: 0  - nicotine (NICODERM) 7 MG/24HR PATCH 24 HR; Apply 1 Patch to skin as directed every 24 hours.  Dispense: 14 Patch; Refill: 0    7. Exercise induced bronchospasm  I have refilled the patient's albuterol inhaler.   - albuterol 108 (90 Base) MCG/ACT Aero Soln inhalation aerosol; Inhale 2 Puffs by mouth every 6 hours as needed for Shortness of Breath.  Dispense: 8.5 g; Refill: 2      Follow up with me in 1 month for a Pap Smear.  We will update health maintenance testing and immunizations at that time.      Henrietta LEVI (Scribe), am scribing for, and in the presence of, Nancy Hunt MD     Electronically signed by: Henrietta Carranza (Scribe), 2/4/2019    Nancy LEVI MD personally performed the services described in this documentation, as scribed by Henrietta Carranza in my presence, and it is both accurate and complete.

## 2019-02-12 DIAGNOSIS — I10 ESSENTIAL HYPERTENSION: ICD-10-CM

## 2019-02-12 DIAGNOSIS — F17.210 CIGARETTE NICOTINE DEPENDENCE WITHOUT COMPLICATION: ICD-10-CM

## 2019-02-12 DIAGNOSIS — E78.5 DYSLIPIDEMIA: ICD-10-CM

## 2019-02-12 RX ORDER — ENALAPRIL MALEATE 20 MG/1
20 TABLET ORAL
Qty: 90 TAB | Refills: 1 | Status: SHIPPED | OUTPATIENT
Start: 2019-02-12 | End: 2019-10-01 | Stop reason: SDUPTHER

## 2019-02-12 RX ORDER — NICOTINE 21 MG/24HR
1 PATCH, TRANSDERMAL 24 HOURS TRANSDERMAL EVERY 24 HOURS
Qty: 14 PATCH | Refills: 0 | Status: SHIPPED | OUTPATIENT
Start: 2019-02-12 | End: 2019-05-13

## 2019-02-12 RX ORDER — SIMVASTATIN 20 MG
20 TABLET ORAL EVERY EVENING
Qty: 90 TAB | Refills: 1 | Status: SHIPPED | OUTPATIENT
Start: 2019-02-12 | End: 2019-06-11

## 2019-02-12 RX ORDER — NICOTINE 21 MG/24HR
1 PATCH, TRANSDERMAL 24 HOURS TRANSDERMAL EVERY 24 HOURS
Qty: 42 PATCH | Refills: 0 | Status: SHIPPED | OUTPATIENT
Start: 2019-02-12 | End: 2019-05-13

## 2019-02-21 ENCOUNTER — OFFICE VISIT (OUTPATIENT)
Dept: MEDICAL GROUP | Facility: PHYSICIAN GROUP | Age: 59
End: 2019-02-21
Payer: COMMERCIAL

## 2019-02-21 VITALS
HEIGHT: 64 IN | HEART RATE: 86 BPM | TEMPERATURE: 97.4 F | OXYGEN SATURATION: 99 % | BODY MASS INDEX: 24.13 KG/M2 | WEIGHT: 141.31 LBS | DIASTOLIC BLOOD PRESSURE: 80 MMHG | SYSTOLIC BLOOD PRESSURE: 144 MMHG

## 2019-02-21 DIAGNOSIS — I10 ESSENTIAL HYPERTENSION: ICD-10-CM

## 2019-02-21 DIAGNOSIS — D12.6 TUBULAR ADENOMA OF COLON: ICD-10-CM

## 2019-02-21 DIAGNOSIS — F41.9 ANXIETY AND DEPRESSION: ICD-10-CM

## 2019-02-21 DIAGNOSIS — E55.9 VITAMIN D DEFICIENCY: ICD-10-CM

## 2019-02-21 DIAGNOSIS — E78.5 DYSLIPIDEMIA: ICD-10-CM

## 2019-02-21 DIAGNOSIS — Z12.39 SCREENING FOR BREAST CANCER: ICD-10-CM

## 2019-02-21 DIAGNOSIS — F32.A ANXIETY AND DEPRESSION: ICD-10-CM

## 2019-02-21 PROCEDURE — 99214 OFFICE O/P EST MOD 30 MIN: CPT | Performed by: FAMILY MEDICINE

## 2019-02-21 NOTE — LETTER
[facility]  Nancy Hunt M.D.  [address]  [facility] [address]  Fax: [phone]   Authorization for Release/Disclosure of   Protected Health Information   Name: CHAYA SKAGGS : 1960 SSN: [id number]   Address: [address] [place] [id number] Phone:    [phone] (home)    I authorize the entity listed below to release/disclose the PHI below to:   [facility]/Nancy Hunt M.D. and Nancy Hunt M.D.   Provider or Entity Name:    [facility]   Address   City, State, Zip   Phone:      Fax:     Reason for request: continuity of care   Information to be released:    [ x ] LAST COLONOSCOPY,  including any PATH REPORT and follow-up  [  ] LAST FIT/COLOGUARD RESULT [  ] LAST DEXA  [  ] LAST MAMMOGRAM  [  ] LAST PAP  [  ] LAST LABS [  ] RETINA EXAM REPORT  [  ] IMMUNIZATION RECORDS  [  ] Release all info      [  ] Check here and initial the line next to each item to release ALL health information INCLUDING  _____ Care and treatment for drug and / or alcohol abuse  _____ HIV testing, infection status, or AIDS  _____ Genetic Testing    DATES OF SERVICE OR TIME PERIOD TO BE DISCLOSED: _____________  I understand and acknowledge that:  * This Authorization may be revoked at any time by you in writing, except if your health information has already been used or disclosed.  * Your health information that will be used or disclosed as a result of you signing this authorization could be re-disclosed by the recipient. If this occurs, your re-disclosed health information may no longer be protected by State or Federal laws.  * You may refuse to sign this Authorization. Your refusal will not affect your ability to obtain treatment.  * This Authorization becomes effective upon signing and will  on (date) __________.      If no date is indicated, this Authorization will  one (1) year from the signature date.    Name: Chaya Skaggs    Signature:   Date:     2019       PLEASE FAX REQUESTED  RECORDS BACK TO: [phone]

## 2019-02-22 NOTE — PROGRESS NOTES
Subjective:      Elina Skaggs is a 58 y.o. female who presents with Anxiety        HPI:  The patient presents for a follow up on her anxiety and other medical problems. She reports she is back on all of her medications since her last visit, but she didn't start them until after she completed her blood work, so it will not reflect improvement on medication.     Anxiety and Depression  She is currently taking hydroxyzine 25mg and Lexapro 10mg daily for her anxiety, without side effects. She is requesting that I fill out an FMLA form for her anxiety and depression. She states that Dr. Aldrich, her pain management doctor, told her to have her PCP fill out as much of the forms as possible, and if he needs to do his part he will help fill out the form. She has to turn in these forms by 2/27/19. She notes that when she gets very anxious and depressed, her legs begin cramping and she suffers spasms in her neck and back. When these pains begin she is unable to work because she has to sit and rest until they improve. She works at [employer] as a , for 3-4 days a week, 12 hours per shift. During each shift she is standing for about 4 hours, sitting for 5 hours, and she has 1.5 hours of break total, including 4 15-minute breaks and a 30-minute lunch. She denies using heavy machinery, walking steps, needing to be on her knees, or overhead reaching. Starting February 2nd, 2019 she took a leave from work due to these anxiety attacks and her depression. She has not gone back to work yet, and is planning on taking 6 weeks off (until March 20th). She is requesting that I fill out the forms approving her leave from work for that time period. She was unable to see her psychiatrist since her last visit, because the psychiatrist got sick and had to cancel the patient's appointment. She is supposed to see her for the first time next week.    Dyslipidemia  She is is back taking simvastatin 20mg daily after she did her lab  "work on 2/12/19,.  She denies side effects.. She notes that she eats a lot of fried foods.     Hypertension  She is now taking enalapril 20mg once daily. Her blood pressure today is 144/80, which is minimally improved from her last visit on 2/4/19, where it was 150/80. Repeat pressure is 140/80.     Preventative Screenings  Dr. Kelley did her colonoscopy in April of 2013, which showed tubular adenoma.  She is overdue for surveillance colonoscopy.  She is due for a mammogram and a Pap Smear.     Vitamin D deficiency  She is not taking a vitamin D supplement. I instructed her to begin doing so.       Past medical history, past surgical history, family history reviewed-no changes    Social history reviewed-no changes    Allergies reviewed-no changes    Medications reviewed-no changes      ROS:  As per the HPI as shown above, the rest are negative.       Objective:     /80 (BP Location: Left arm, Patient Position: Sitting, BP Cuff Size: Adult)   Pulse 86   Temp 36.3 °C (97.4 °F) (Temporal)   Ht 1.626 m (5' 4\")   Wt 64.1 kg (141 lb 5 oz)   SpO2 99%   BMI 24.26 kg/m²     Physical Exam  Examined alert, awake, oriented, not in distress    Neck-supple, no lymphadenopathy, no thyromegaly  Lungs-clear to auscultation, no rales, no wheezes  Heart-regular rate and rhythm, no murmur  Extremities-no edema, clubbing, cyanosis  Psych-she appears calm, affect appropriate, judgment normal, behavior normal, thought content normal    Lab results from [facility] done on 2/12/19    Total cholesterol 233, HDL 58, triglycerides 124,     Fasting blood sugar 93, the rest of the CMP within normal limits     Assessment/Plan:   1. Essential hypertension  The patient's blood pressure is mildly elevated today at 144/80. Repeat pressure is 140/80. This is minimally improved from her last visit on 2/4/19, where it was 150/80. Continue taking enalapril as prescribed. I instructed her to increase her exercise and decrease " her salt intake.  I will reevaluate when she returns in a month for her GYN exam/Pap smear and consider increasing the dose at that time if needed.  - Lipid Profile; Future  - Comp Metabolic Panel; Future  - CBC WITH DIFFERENTIAL; Future  - VITAMIN D,25 HYDROXY; Future    2. Dyslipidemia  The patient's cholesterol is still elevated, however she had not started her simvastatin when she completed her lab work.  She was out of the cholesterol medication for a while.  She will continue taking simvastatin as prescribed, and I will recheck her cholesterol levels during her next visit in 3 months. I instructed her to stay away from fatty foods.   - Lipid Profile; Future  - Comp Metabolic Panel; Future  - CBC WITH DIFFERENTIAL; Future  - VITAMIN D,25 HYDROXY; Future    3. Vitamin D deficiency  I will check the patient's Vitamin D levels during her next visit in 3 months. Until then I have instructed ehr to begin taking OTC vitamin D 2000 international units daily.   - Lipid Profile; Future  - Comp Metabolic Panel; Future  - CBC WITH DIFFERENTIAL; Future  - VITAMIN D,25 HYDROXY; Future    4. Anxiety and depression  She will continue taking Lexapro and hydroxyzine as directed. I instructed her to see her psychiatrist as scheduled. If her psychiatrist changes her medications, she will let me know. I have agreed to fill out her FMLA forms and will fax them to her desired location for her to receive them.     5. Tubular adenoma of colon  I have referred her to GI specialist to have a surveillance colonoscopy.  - REFERRAL TO GASTROENTEROLOGY    6. Screening for breast cancer  The patient is overdue for screening mammogram. She will call and make an appointment.   - MA-SCREEN MAMMO W/CAD-BILAT; Future      Follow up with me on March 4th, 2019 as scheduled.       Henrietta LEVI (Scribe), am scribing for, and in the presence of, Nancy Hunt MD     Electronically signed by: Henrietta Carranza (Scribe), 2/21/2019    Nancy LEVI  MD Gilbert personally performed the services described in this documentation, as scribed by Henrietta Carranza in my presence, and it is both accurate and complete.

## 2019-04-03 ENCOUNTER — HOSPITAL ENCOUNTER (OUTPATIENT)
Facility: MEDICAL CENTER | Age: 59
End: 2019-04-03
Attending: FAMILY MEDICINE
Payer: COMMERCIAL

## 2019-04-03 ENCOUNTER — OFFICE VISIT (OUTPATIENT)
Dept: MEDICAL GROUP | Facility: PHYSICIAN GROUP | Age: 59
End: 2019-04-03
Payer: COMMERCIAL

## 2019-04-03 VITALS
DIASTOLIC BLOOD PRESSURE: 80 MMHG | HEART RATE: 89 BPM | WEIGHT: 135.14 LBS | BODY MASS INDEX: 23.07 KG/M2 | HEIGHT: 64 IN | OXYGEN SATURATION: 96 % | TEMPERATURE: 97.1 F | SYSTOLIC BLOOD PRESSURE: 136 MMHG

## 2019-04-03 DIAGNOSIS — F17.200 TOBACCO DEPENDENCE: ICD-10-CM

## 2019-04-03 DIAGNOSIS — N39.0 URINARY TRACT INFECTION WITHOUT HEMATURIA, SITE UNSPECIFIED: ICD-10-CM

## 2019-04-03 DIAGNOSIS — G44.039 EPISODIC PAROXYSMAL HEMICRANIA, NOT INTRACTABLE: ICD-10-CM

## 2019-04-03 LAB
APPEARANCE UR: NORMAL
BILIRUB UR STRIP-MCNC: NORMAL MG/DL
COLOR UR AUTO: NORMAL
GLUCOSE UR STRIP.AUTO-MCNC: NORMAL MG/DL
KETONES UR STRIP.AUTO-MCNC: NORMAL MG/DL
LEUKOCYTE ESTERASE UR QL STRIP.AUTO: NORMAL
NITRITE UR QL STRIP.AUTO: NORMAL
PH UR STRIP.AUTO: 6 [PH] (ref 5–8)
PROT UR QL STRIP: 30 MG/DL
RBC UR QL AUTO: NORMAL
SP GR UR STRIP.AUTO: 1.02
UROBILINOGEN UR STRIP-MCNC: 0.2 MG/DL

## 2019-04-03 PROCEDURE — 81002 URINALYSIS NONAUTO W/O SCOPE: CPT | Performed by: FAMILY MEDICINE

## 2019-04-03 PROCEDURE — 99214 OFFICE O/P EST MOD 30 MIN: CPT | Performed by: FAMILY MEDICINE

## 2019-04-03 PROCEDURE — 87086 URINE CULTURE/COLONY COUNT: CPT

## 2019-04-03 RX ORDER — NITROFURANTOIN 25; 75 MG/1; MG/1
100 CAPSULE ORAL 2 TIMES DAILY
Qty: 10 CAP | Refills: 0 | Status: SHIPPED | OUTPATIENT
Start: 2019-04-03 | End: 2019-05-13

## 2019-04-03 NOTE — PROGRESS NOTES
Subjective:      Elina Skaggs is a 58 y.o. female who presents with UTI        HPI:    Patient is here for evaluation of UTI symptoms and chronic headaches.    Urinary tract infection without hematuria, site unspecified  This is a new problem that started one week ago. Patient reports associated malodorous urine and dysuria. She reports pain across her lower back that radiates to her groin. This pain is exacerbated when urinating. Patient denies any fevers or chills.    Episodic paroxysmal hemicrania, not intractable  Patient states she has been having intermittent left occipital headaches that have been occurring over the last 6 months. She states the headaches do not occur everyday. States they probably occur every other day. She states her head feels like it will explode. Episodes last for less than 1 minute and occur several times in a day. She rates the headaches as a 10/10 in severity when they occur. She reports having radiation of pain to her neck area. She states this pain is different from when she had neck surgery in the past. Patient states she also experiences associated dizziness with the headache episodes. She states these headaches have been progressively worsening. She denies any pain, numbness, or tingling down her arm. She currently does not have a headache on exam. Patient notes her maternal aunt has history of aneurysm.  She wants to make sure she does not have an aneurysm that would explain the headache.    Tobacco dependence  Patient states she started Chantix last week. This is currently her second week on the medication. Patient thinks the mediation is working, as her craving to smoke a cigarette has decreased.  She said she has not finished up back yet since she opened it 3 days ago.  She was smoking three quarters of a pack per day. Since starting Chantix, she has had generalized itchiness that only occurs at night. However, she is able to tolerate the medication still. She denies any  "rashes.       Past medical history, past surgical history, family history reviewed-no changes    Social history reviewed-no changes    Allergies reviewed-no changes    Medications reviewed-no changes     ROS:  As per the HPI as shown above, the rest are negative.       Objective:     /80 (BP Location: Left arm, Patient Position: Sitting, BP Cuff Size: Adult)   Pulse 89   Temp 36.2 °C (97.1 °F) (Temporal)   Ht 1.626 m (5' 4\")   Wt 61.3 kg (135 lb 2.3 oz)   SpO2 96%   BMI 23.20 kg/m²     Physical Exam  Examined alert, awake, oriented, not in distress    Neck-supple, no lymphadenopathy, no thyromegaly  Lungs-clear to auscultation, no rales, no wheezes  Heart-regular rate and rhythm, no murmur  Abdomen-good bowel sounds, soft, mild tenderness in the suprapubic area and to the right and left lower quadrants.  No hepatosplenomegaly, no CVA tenderness.   Extremities-no edema, clubbing, cyanosis     Urine dipstick    Component Results     Component Value Ref Range & Units Status   POC Color davie  Negative Final   POC Appearance slightly cloudy  Negative Final   POC Leukocyte Esterase trace  Negative Final   POC Nitrites neg  Negative Final   POC Urobiligen 0.2  Negative (0.2) mg/dL Final   POC Protein 30  Negative mg/dL Final   POC Urine PH 6.0  5.0 - 8.0 Final   POC Blood mod  Negative Final   POC Specific Gravity 1.025  <1.005 - >1.030 Final   POC Ketones trace  Negative mg/dL Final   POC Bilirubin small  Negative mg/dL Final   POC Glucose neg  Negative mg/dL Final          Assessment/Plan:       1. Urinary tract infection without hematuria, site unspecified  - Urine test in clinic shows evidence for UTI.  - Prescribing macrobid pending culture results. Supportive care instructions and return precautions given. Advised patient to stay hydrated with plenty of fluids.  I will communicate with patient results when available.  - POCT Urinalysis  - URINE CULTURE(NEW); Future  - nitrofurantoin monohyd macro " (MACROBID) 100 MG Cap; Take 1 Cap by mouth 2 times a day.  Dispense: 10 Cap; Refill: 0    2. Episodic paroxysmal hemicrania, not intractable  - Ordering MRA of the head to rule out aneurysm especially because of family history.  - Supportive care instructions and return precautions given.   - MR-MRA HEAD-WITH & W/O AND SEQUENCES; Future    3. Tobacco dependence  - Patient notes she is currently on her second week on Chantix. She states her craving to smoke cigarettes have decreased while on this medication. Advised patient to continue taking the Chantix. Advised her to set quit date which is usually two weeks from the time she starts the medication. She understands and agrees.       Omar LEVI (Scribe), am scribing for, and in the presence of, Nancy Hunt MD     Electronically signed by: Omar Morin (Scribe), 4/3/2019    Nancy LEVI MD personally performed the services described in this documentation, as scribed by Omar Morin in my presence, and it is both accurate and complete.

## 2019-04-06 LAB
BACTERIA UR CULT: NORMAL
SIGNIFICANT IND 70042: NORMAL
SITE SITE: NORMAL
SOURCE SOURCE: NORMAL

## 2019-04-08 ENCOUNTER — TELEPHONE (OUTPATIENT)
Dept: MEDICAL GROUP | Facility: PHYSICIAN GROUP | Age: 59
End: 2019-04-08

## 2019-04-08 NOTE — TELEPHONE ENCOUNTER
----- Message from Nancy Hunt M.D. sent at 4/8/2019  6:49 AM PDT -----  Urine culture came back no evidence of infection.  She can stop the antibiotic I gave her because she does not have urinary tract infection.  Please have her increase her fluid intake 4-5 glasses/day to help with her urinary symptoms.

## 2019-04-08 NOTE — LETTER
April 10, 2019        Elina Skaggs  [address] [place] [id number]        Dear Elina:    Our office has been unable to reach you by phone.  Please contact our office at your earliest convenience.  Thank you.          Sincerely,        Nancy Hunt M.D.    Electronically Signed

## 2019-04-08 NOTE — TELEPHONE ENCOUNTER
Phone Number Called: [phone] (home) [phone] (work)    Message: LVM to call back.     Left Message for patient to call back: yes

## 2019-04-09 NOTE — TELEPHONE ENCOUNTER
Phone Number Called: [phone] (home) [phone] (work)     Message: LVM to call back.      Left Message for patient to call back: yes

## 2019-04-10 NOTE — TELEPHONE ENCOUNTER
Phone Number Called: [phone] (home)     Message: LVM to call back.     Left Message for patient to call back: yes

## 2019-04-15 ENCOUNTER — TELEPHONE (OUTPATIENT)
Dept: MEDICAL GROUP | Facility: PHYSICIAN GROUP | Age: 59
End: 2019-04-15

## 2019-04-25 ENCOUNTER — HOSPITAL ENCOUNTER (OUTPATIENT)
Dept: RADIOLOGY | Facility: MEDICAL CENTER | Age: 59
End: 2019-04-25
Attending: FAMILY MEDICINE
Payer: COMMERCIAL

## 2019-04-25 ENCOUNTER — TELEPHONE (OUTPATIENT)
Dept: MEDICAL GROUP | Facility: PHYSICIAN GROUP | Age: 59
End: 2019-04-25

## 2019-04-25 DIAGNOSIS — Z12.39 SCREENING FOR BREAST CANCER: ICD-10-CM

## 2019-04-25 PROCEDURE — 77063 BREAST TOMOSYNTHESIS BI: CPT

## 2019-04-25 NOTE — TELEPHONE ENCOUNTER
Phone Number Called: [phone] (home)     Message: Unable to reach pt left vm to call back regarding results.     Left Message for patient to call back: yes

## 2019-04-25 NOTE — TELEPHONE ENCOUNTER
----- Message from Nancy Hunt M.D. sent at 4/25/2019  3:58 PM PDT -----  Your mammogram came back no evidence of malignancy. You however have dense breasts which may miss small masses. You have an option of doing ultrasound for dense breasts called sono SCYFIX. This may or may not be covered by your insurance and can cost around $125. If you want to do this test please let me know so I can order it. Otherwise we will just do the yearly screening mammogram.

## 2019-04-26 ENCOUNTER — TELEPHONE (OUTPATIENT)
Dept: MEDICAL GROUP | Facility: PHYSICIAN GROUP | Age: 59
End: 2019-04-26

## 2019-04-26 DIAGNOSIS — J32.3 SPHENOID SINUSITIS, UNSPECIFIED CHRONICITY: ICD-10-CM

## 2019-04-26 RX ORDER — AMOXICILLIN AND CLAVULANATE POTASSIUM 875; 125 MG/1; MG/1
1 TABLET, FILM COATED ORAL 2 TIMES DAILY
Qty: 28 TAB | Refills: 0 | Status: ON HOLD | OUTPATIENT
Start: 2019-04-26 | End: 2019-06-06

## 2019-04-26 NOTE — TELEPHONE ENCOUNTER
Phone Number Called: [phone] Tita     Message: I spoke with patient's friend Tita as both daughters listed along with patient do not have working phones #s.  Updated daughter Jo's phone number from Tita however it is also non working.  Tita will have patient contact our office.     Left Message for patient to call back: no

## 2019-04-26 NOTE — TELEPHONE ENCOUNTER
Phone Number Called: [phone] daughter Jo (patient's phone # not working)    Message: Unable to LVM for daughter either.     Left Message for patient to call back: no

## 2019-04-26 NOTE — LETTER
April 29, 2019        Elina Skaggs  [address] [place] [id number]        Dear Elina:    Our office has been unable to reach you by phone.  Please contact our office at your earliest convenience.  Thank you.          Sincerely,        Nancy Hunt M.D.    Electronically Signed

## 2019-04-26 NOTE — TELEPHONE ENCOUNTER
----- Message from Nancy Hunt M.D. sent at 4/26/2019  8:53 AM PDT -----  MRI of the brain did not show any stroke, tumors, bleed.  It however showed severe sinus infection which may be causing her headaches.  I will send antibiotics to her pharmacy to treat the sinus infection.  She will have to take the antibiotics 1 tablet twice a day with food for 14 days.  If the headaches do not clear after treatment please have her contact us.

## 2019-04-30 ENCOUNTER — TELEPHONE (OUTPATIENT)
Dept: MEDICAL GROUP | Facility: PHYSICIAN GROUP | Age: 59
End: 2019-04-30

## 2019-04-30 DIAGNOSIS — G44.039 EPISODIC PAROXYSMAL HEMICRANIA, NOT INTRACTABLE: ICD-10-CM

## 2019-04-30 DIAGNOSIS — I67.1 INTRACEREBRAL ANEURYSM: ICD-10-CM

## 2019-04-30 NOTE — TELEPHONE ENCOUNTER
The MRI did not show aneurysm.  It did show severe case of sinus infection and I already sent antibiotic prescription to her pharmacy.  She has to take the antibiotics as directed until she is finished.  The sinus infection is most likely the cause of the headache.  She should let us know if the headache does not go away after she is done with antibiotics.

## 2019-04-30 NOTE — TELEPHONE ENCOUNTER
Phone Number Called: [phone]    Message: Unable to reach pt on her on phone its disconnected. Called daughter Jo, and voicemail is full.     Left Message for patient to call back: N\A

## 2019-05-01 NOTE — TELEPHONE ENCOUNTER
Phone Number Called: [phone] (home)     Message: Spoke with Jo and notified of results no other questions at this time.     Left Message for patient to call back: N\A

## 2019-05-07 ENCOUNTER — HOSPITAL ENCOUNTER (OUTPATIENT)
Dept: RADIOLOGY | Facility: MEDICAL CENTER | Age: 59
End: 2019-05-07

## 2019-05-08 ENCOUNTER — TELEPHONE (OUTPATIENT)
Dept: MEDICAL GROUP | Facility: PHYSICIAN GROUP | Age: 59
End: 2019-05-08

## 2019-05-09 ENCOUNTER — APPOINTMENT (OUTPATIENT)
Dept: MEDICAL GROUP | Facility: PHYSICIAN GROUP | Age: 59
End: 2019-05-09
Payer: OTHER GOVERNMENT

## 2019-05-13 ENCOUNTER — OFFICE VISIT (OUTPATIENT)
Dept: MEDICAL GROUP | Facility: PHYSICIAN GROUP | Age: 59
End: 2019-05-13
Payer: COMMERCIAL

## 2019-05-13 VITALS
TEMPERATURE: 98.1 F | HEART RATE: 74 BPM | SYSTOLIC BLOOD PRESSURE: 140 MMHG | OXYGEN SATURATION: 93 % | BODY MASS INDEX: 23 KG/M2 | WEIGHT: 134.7 LBS | HEIGHT: 64 IN | DIASTOLIC BLOOD PRESSURE: 70 MMHG

## 2019-05-13 DIAGNOSIS — F41.9 ANXIETY: ICD-10-CM

## 2019-05-13 DIAGNOSIS — I67.1 INTRACEREBRAL ANEURYSM: ICD-10-CM

## 2019-05-13 DIAGNOSIS — I10 ESSENTIAL HYPERTENSION: ICD-10-CM

## 2019-05-13 DIAGNOSIS — J32.3 SPHENOID SINUSITIS, UNSPECIFIED CHRONICITY: ICD-10-CM

## 2019-05-13 PROCEDURE — 99214 OFFICE O/P EST MOD 30 MIN: CPT | Performed by: FAMILY MEDICINE

## 2019-05-13 RX ORDER — AMLODIPINE BESYLATE 5 MG/1
5 TABLET ORAL DAILY
Qty: 90 TAB | Refills: 1 | Status: SHIPPED | OUTPATIENT
Start: 2019-05-13 | End: 2019-06-11

## 2019-05-13 NOTE — PROGRESS NOTES
Subjective:      Elina Skaggs is a 59 y.o. female who presents with Sinusitis and Headache        HPI:    Patient is here for follow-up of her medical problems as well as for some concerns. She is due for a pap smear. Patient is scheduled for her colonoscopy next week.    Sphenoid sinusitis, unspecified chronicity  Chronic headache  We are following the patient for a persistent left sided headache. Her brain MRI from 4/25/2019 demonstrated a severe bilateral sphenoid sinus infection and I prescribed a two-week course of Augmentin. She finished the Augmentin today. She only reports very mild sinus pressure, worse on the right. She denies any green or yellow rhinorrhea, congestion, or post nasal drip. Her headaches have persisted and now she is experiencing bilateral neck spasms. The patient is followed by her pain specialist and was prescribed Imitrex for her headaches. She is also prescribed morphine and plain 10 mg oxycodone four times daily. She also receives botox injections.    Intracerebral aneurysm  Also, her brain MRI demonstrated an intracranial aneurysm 2 mm saccular aneurysm left cavernous segment of the ICA.  I referred her to the neurosurgeon and her appointment is on 6/11/2019.     Tobacco dependence   she has been on Chantix for over 30 days. She has decreased her cigarette use to 4 cigarettes/day.    Essential hypertension  The patient continues to take enalapril 20mg once daily. Her blood pressure is 140/70 in the office today with a repeat pressure of 144/72.    Anxiety  She is now followed by a psychiatrist, Dr. Freeman. She is not taking the Lexapro 10mg daily for her anxiety and is requesting Valium. She is coping with the death of her uncle as well as some other problems with her family.    Past medical history, past surgical history, family history reviewed-no changes    Social history reviewed-no changes    Allergies reviewed-no changes    Medications reviewed-no changes    ROS:  As per the  "HPI as shown above, the rest are negative.       Objective:     /70 (BP Location: Left arm, Patient Position: Sitting, BP Cuff Size: Adult)   Pulse 74   Temp 36.7 °C (98.1 °F) (Temporal)   Ht 1.626 m (5' 4\")   Wt 61.1 kg (134 lb 11.2 oz)   SpO2 93%   BMI 23.12 kg/m²     Physical Exam    Examined alert, awake, oriented. Anxious, tearful.    Nose-no obstruction, no discharge, slightly tender right maxillary sinus  Neck-supple, no lymphadenopathy, no thyromegaly  Lungs-clear to auscultation, no rales, no wheezes  Heart-regular rate and rhythm, no murmur  Extremities-no edema, clubbing, cyanosis       Assessment/Plan:     1. Intracerebral aneurysm  We discussed her brain aneurysm and associated pathology. I explained the importance of smoking cessation and blood pressure control.  Keep appointment with neurosurgeon.  We will add a second agent to control the blood pressure since this is not adequately controlled.    2. Sphenoid sinusitis, unspecified chronicity  The patient finished her dose of Augmentin, but her headaches have persisted. She only has mild right maxillary sinus tenderness on exam.      3. Essential hypertension  Blood pressure is 140/70 in the office today with a repeat pressure of 144/72. We need to better control her blood pressure. I will add amlodipine 5 mg to her enalapril and have her follow up for blood pressure recheck in 1 month  - amLODIPine (NORVASC) 5 MG Tab; Take 1 Tab by mouth every day.  Dispense: 90 Tab; Refill: 1    4. Anxiety  Made aware of black box warning with opiates and benzodiazepines therefore she cannot be on benzodiazepine because she is taking opiates which are oxycodone 10 mg and morphine 15 mg for her chronic neck pain prescribed by her pain specialist.  She understands that she must discuss anxiety medication with Dr. Freeman her psychiatrist. Reviewed the patient's prescription history on [place] Prescription Monitoring Program which showed she was last " prescribed alprazolam by Dr. Cabrales in October 2018.  Her current pain specialist is Dr. Aldrich who prescribes the oxycodone 10 mg and morphine 15 mg to her.    Return in 1 month for follow-up of hypertension and for GYN exam/Pap smear.    Dave LEVI (Scribe), am scribing for, and in the presence of, Nancy Hunt MD     Electronically signed by: Dave Price (Scribe), 5/13/2019    INancy MD personally performed the services described in this documentation, as scribed by Dave Price in my presence, and it is both accurate and complete.

## 2019-05-15 ENCOUNTER — HOSPITAL ENCOUNTER (OUTPATIENT)
Dept: RADIOLOGY | Facility: MEDICAL CENTER | Age: 59
End: 2019-05-15
Attending: NURSE PRACTITIONER
Payer: COMMERCIAL

## 2019-05-15 DIAGNOSIS — M47.816 LUMBAR SPONDYLOSIS: ICD-10-CM

## 2019-05-15 DIAGNOSIS — M54.17 LUMBOSACRAL NEURITIS: ICD-10-CM

## 2019-05-15 PROCEDURE — 72110 X-RAY EXAM L-2 SPINE 4/>VWS: CPT

## 2019-05-23 ENCOUNTER — TELEPHONE (OUTPATIENT)
Dept: MEDICAL GROUP | Facility: PHYSICIAN GROUP | Age: 59
End: 2019-05-23

## 2019-05-25 NOTE — TELEPHONE ENCOUNTER
VOICEMAIL  1. Caller Name: Elina                    Call Back Number: [phone] (home)     2. Message: Patient LVM at 4:50 PM stating that she needs her FMLA completed ASAP.  She called back @ 4:56 PM and again stated this needs to be done and extended to June.  I tried to reach patient however her phone number and her daughter Jo's phone number is not in service.  I tried contacting other daughter Carline however the number we have on file is incorrect and belongs to someone else.      3. Patient approves office to leave a detailed voicemail/Task Messengerhart message: N\A       / [place] could you please try reaching patient again on Tuesday.  Appears FMLA is in your credenza.  I discussed this situation with  and unfortunately we are not able to reach patient x3 therefore will address next week during business hours.

## 2019-05-25 NOTE — TELEPHONE ENCOUNTER
I have called and left a message on daughter's contact number Jo Skaggs [phone] on Thursday, 5/23/2019 clarify FMLA since I have already filled out one for her.  She called back and left a message to fill out the FMLA form the way I filled it out the last time.  As per her message today 5/24/2019 FMLA needs to be extended to June.  I tried calling contact #[phone] Jo Skaggs patient's daughter and [phone] patient's phone number and both are temporarily disconnected.  We will call again on Tuesday to clarify the form.  There are also certain documents that need to be submitted which includes visit notes from patient's psychiatrist Dr. Freeman and visit note/procedure note from patient's pain specialist Dr. Aldrich.

## 2019-05-28 ENCOUNTER — TELEPHONE (OUTPATIENT)
Dept: MEDICAL GROUP | Facility: PHYSICIAN GROUP | Age: 59
End: 2019-05-28

## 2019-05-28 NOTE — TELEPHONE ENCOUNTER
Left message on voicemail that [place] paperwork has to be filled out by her psychiatrist since this is specifically for behavioral health and not for me to fill out.

## 2019-05-30 ENCOUNTER — TELEPHONE (OUTPATIENT)
Dept: MEDICAL GROUP | Facility: PHYSICIAN GROUP | Age: 59
End: 2019-05-30

## 2019-05-30 NOTE — TELEPHONE ENCOUNTER
Tita from [employer] called and was asking questions.  I called her back got Voice Message and left her a message that I had just faxed her the FM[employer] paper work that was requested and that it should give them all the information that she needs. If not to please give a return call.

## 2019-06-05 ENCOUNTER — APPOINTMENT (OUTPATIENT)
Dept: RADIOLOGY | Facility: MEDICAL CENTER | Age: 59
DRG: 683 | End: 2019-06-05
Attending: EMERGENCY MEDICINE
Payer: COMMERCIAL

## 2019-06-05 ENCOUNTER — HOSPITAL ENCOUNTER (INPATIENT)
Facility: MEDICAL CENTER | Age: 59
LOS: 2 days | DRG: 683 | End: 2019-06-08
Attending: EMERGENCY MEDICINE | Admitting: HOSPITALIST
Payer: COMMERCIAL

## 2019-06-05 DIAGNOSIS — R51.9 NONINTRACTABLE HEADACHE, UNSPECIFIED CHRONICITY PATTERN, UNSPECIFIED HEADACHE TYPE: ICD-10-CM

## 2019-06-05 DIAGNOSIS — E86.1 HYPOTENSION DUE TO HYPOVOLEMIA: ICD-10-CM

## 2019-06-05 DIAGNOSIS — N17.9 ACUTE KIDNEY INJURY (HCC): ICD-10-CM

## 2019-06-05 DIAGNOSIS — I95.89 HYPOTENSION DUE TO HYPOVOLEMIA: ICD-10-CM

## 2019-06-05 LAB
ALBUMIN SERPL BCP-MCNC: 4.3 G/DL (ref 3.2–4.9)
ALBUMIN/GLOB SERPL: 1.4 G/DL
ALP SERPL-CCNC: 80 U/L (ref 30–99)
ALT SERPL-CCNC: 15 U/L (ref 2–50)
ANION GAP SERPL CALC-SCNC: 7 MMOL/L (ref 0–11.9)
AST SERPL-CCNC: 16 U/L (ref 12–45)
BASOPHILS # BLD AUTO: 0.9 % (ref 0–1.8)
BASOPHILS # BLD: 0.07 K/UL (ref 0–0.12)
BILIRUB SERPL-MCNC: 0.4 MG/DL (ref 0.1–1.5)
BUN SERPL-MCNC: 16 MG/DL (ref 8–22)
CALCIUM SERPL-MCNC: 9.4 MG/DL (ref 8.5–10.5)
CHLORIDE SERPL-SCNC: 103 MMOL/L (ref 96–112)
CO2 SERPL-SCNC: 29 MMOL/L (ref 20–33)
CREAT SERPL-MCNC: 2.24 MG/DL (ref 0.5–1.4)
EOSINOPHIL # BLD AUTO: 0.16 K/UL (ref 0–0.51)
EOSINOPHIL NFR BLD: 2 % (ref 0–6.9)
ERYTHROCYTE [DISTWIDTH] IN BLOOD BY AUTOMATED COUNT: 47.8 FL (ref 35.9–50)
GLOBULIN SER CALC-MCNC: 3.1 G/DL (ref 1.9–3.5)
GLUCOSE SERPL-MCNC: 88 MG/DL (ref 65–99)
HCT VFR BLD AUTO: 41.5 % (ref 37–47)
HGB BLD-MCNC: 13.1 G/DL (ref 12–16)
IMM GRANULOCYTES # BLD AUTO: 0.01 K/UL (ref 0–0.11)
IMM GRANULOCYTES NFR BLD AUTO: 0.1 % (ref 0–0.9)
LACTATE BLD-SCNC: 1 MMOL/L (ref 0.5–2)
LYMPHOCYTES # BLD AUTO: 3.9 K/UL (ref 1–4.8)
LYMPHOCYTES NFR BLD: 49.2 % (ref 22–41)
MCH RBC QN AUTO: 29.6 PG (ref 27–33)
MCHC RBC AUTO-ENTMCNC: 31.6 G/DL (ref 33.6–35)
MCV RBC AUTO: 93.7 FL (ref 81.4–97.8)
MONOCYTES # BLD AUTO: 0.71 K/UL (ref 0–0.85)
MONOCYTES NFR BLD AUTO: 9 % (ref 0–13.4)
NEUTROPHILS # BLD AUTO: 3.07 K/UL (ref 2–7.15)
NEUTROPHILS NFR BLD: 38.8 % (ref 44–72)
NRBC # BLD AUTO: 0 K/UL
NRBC BLD-RTO: 0 /100 WBC
PLATELET # BLD AUTO: 331 K/UL (ref 164–446)
PMV BLD AUTO: 9.9 FL (ref 9–12.9)
POTASSIUM SERPL-SCNC: 4.2 MMOL/L (ref 3.6–5.5)
PROT SERPL-MCNC: 7.4 G/DL (ref 6–8.2)
RBC # BLD AUTO: 4.43 M/UL (ref 4.2–5.4)
SODIUM SERPL-SCNC: 139 MMOL/L (ref 135–145)
WBC # BLD AUTO: 7.9 K/UL (ref 4.8–10.8)

## 2019-06-05 PROCEDURE — 87040 BLOOD CULTURE FOR BACTERIA: CPT

## 2019-06-05 PROCEDURE — 700111 HCHG RX REV CODE 636 W/ 250 OVERRIDE (IP): Performed by: EMERGENCY MEDICINE

## 2019-06-05 PROCEDURE — 70450 CT HEAD/BRAIN W/O DYE: CPT

## 2019-06-05 PROCEDURE — 80053 COMPREHEN METABOLIC PANEL: CPT

## 2019-06-05 PROCEDURE — 83605 ASSAY OF LACTIC ACID: CPT

## 2019-06-05 PROCEDURE — 96374 THER/PROPH/DIAG INJ IV PUSH: CPT

## 2019-06-05 PROCEDURE — 93005 ELECTROCARDIOGRAM TRACING: CPT | Performed by: EMERGENCY MEDICINE

## 2019-06-05 PROCEDURE — 36415 COLL VENOUS BLD VENIPUNCTURE: CPT

## 2019-06-05 PROCEDURE — 99285 EMERGENCY DEPT VISIT HI MDM: CPT

## 2019-06-05 PROCEDURE — 84484 ASSAY OF TROPONIN QUANT: CPT

## 2019-06-05 PROCEDURE — 71045 X-RAY EXAM CHEST 1 VIEW: CPT

## 2019-06-05 PROCEDURE — 85025 COMPLETE CBC W/AUTO DIFF WBC: CPT

## 2019-06-05 PROCEDURE — 700105 HCHG RX REV CODE 258: Performed by: EMERGENCY MEDICINE

## 2019-06-05 RX ORDER — LORAZEPAM 2 MG/ML
1 INJECTION INTRAMUSCULAR ONCE
Status: ACTIVE | OUTPATIENT
Start: 2019-06-05 | End: 2019-06-06

## 2019-06-05 RX ORDER — SODIUM CHLORIDE 9 MG/ML
1000 INJECTION, SOLUTION INTRAVENOUS ONCE
Status: COMPLETED | OUTPATIENT
Start: 2019-06-05 | End: 2019-06-05

## 2019-06-05 RX ADMIN — FENTANYL CITRATE 50 MCG: 50 INJECTION INTRAMUSCULAR; INTRAVENOUS at 22:26

## 2019-06-05 RX ADMIN — SODIUM CHLORIDE 1000 ML: 9 INJECTION, SOLUTION INTRAVENOUS at 22:38

## 2019-06-06 ENCOUNTER — APPOINTMENT (OUTPATIENT)
Dept: RADIOLOGY | Facility: MEDICAL CENTER | Age: 59
DRG: 683 | End: 2019-06-06
Attending: HOSPITALIST
Payer: COMMERCIAL

## 2019-06-06 PROBLEM — N17.9 AKI (ACUTE KIDNEY INJURY) (HCC): Status: ACTIVE | Noted: 2019-06-06

## 2019-06-06 LAB
ANION GAP SERPL CALC-SCNC: 8 MMOL/L (ref 0–11.9)
APPEARANCE UR: ABNORMAL
BACTERIA #/AREA URNS HPF: ABNORMAL /HPF
BILIRUB UR QL STRIP.AUTO: NEGATIVE
BUN SERPL-MCNC: 12 MG/DL (ref 8–22)
CALCIUM SERPL-MCNC: 8.3 MG/DL (ref 8.5–10.5)
CHLORIDE SERPL-SCNC: 109 MMOL/L (ref 96–112)
CO2 SERPL-SCNC: 26 MMOL/L (ref 20–33)
COLOR UR: YELLOW
CREAT SERPL-MCNC: 0.76 MG/DL (ref 0.5–1.4)
CREAT UR-MCNC: 144 MG/DL
CREAT UR-MCNC: 145.8 MG/DL
EKG IMPRESSION: NORMAL
EPI CELLS #/AREA URNS HPF: ABNORMAL /HPF
GLUCOSE SERPL-MCNC: 120 MG/DL (ref 65–99)
GLUCOSE UR STRIP.AUTO-MCNC: NEGATIVE MG/DL
HYALINE CASTS #/AREA URNS LPF: ABNORMAL /LPF
KETONES UR STRIP.AUTO-MCNC: NEGATIVE MG/DL
LEUKOCYTE ESTERASE UR QL STRIP.AUTO: ABNORMAL
MICRO URNS: ABNORMAL
MICROALBUMIN UR-MCNC: 3 MG/DL
MICROALBUMIN/CREAT UR: 21 MG/G (ref 0–30)
NITRITE UR QL STRIP.AUTO: NEGATIVE
PH UR STRIP.AUTO: 5.5 [PH]
POTASSIUM SERPL-SCNC: 3.5 MMOL/L (ref 3.6–5.5)
PROT UR QL STRIP: NEGATIVE MG/DL
RBC # URNS HPF: ABNORMAL /HPF
RBC UR QL AUTO: ABNORMAL
SODIUM SERPL-SCNC: 143 MMOL/L (ref 135–145)
SODIUM UR-SCNC: 22 MMOL/L
SP GR UR STRIP.AUTO: 1.01
TROPONIN I SERPL-MCNC: <0.01 NG/ML (ref 0–0.04)
UROBILINOGEN UR STRIP.AUTO-MCNC: 0.2 MG/DL
WBC #/AREA URNS HPF: ABNORMAL /HPF

## 2019-06-06 PROCEDURE — 94760 N-INVAS EAR/PLS OXIMETRY 1: CPT

## 2019-06-06 PROCEDURE — 770020 HCHG ROOM/CARE - TELE (206)

## 2019-06-06 PROCEDURE — 76775 US EXAM ABDO BACK WALL LIM: CPT

## 2019-06-06 PROCEDURE — 700102 HCHG RX REV CODE 250 W/ 637 OVERRIDE(OP): Performed by: INTERNAL MEDICINE

## 2019-06-06 PROCEDURE — 700105 HCHG RX REV CODE 258: Performed by: HOSPITALIST

## 2019-06-06 PROCEDURE — 99223 1ST HOSP IP/OBS HIGH 75: CPT | Performed by: HOSPITALIST

## 2019-06-06 PROCEDURE — 700111 HCHG RX REV CODE 636 W/ 250 OVERRIDE (IP): Performed by: HOSPITALIST

## 2019-06-06 PROCEDURE — 700102 HCHG RX REV CODE 250 W/ 637 OVERRIDE(OP): Performed by: HOSPITALIST

## 2019-06-06 PROCEDURE — 97165 OT EVAL LOW COMPLEX 30 MIN: CPT

## 2019-06-06 PROCEDURE — 700111 HCHG RX REV CODE 636 W/ 250 OVERRIDE (IP): Performed by: INTERNAL MEDICINE

## 2019-06-06 PROCEDURE — 87086 URINE CULTURE/COLONY COUNT: CPT

## 2019-06-06 PROCEDURE — 82570 ASSAY OF URINE CREATININE: CPT

## 2019-06-06 PROCEDURE — 97161 PT EVAL LOW COMPLEX 20 MIN: CPT

## 2019-06-06 PROCEDURE — 84300 ASSAY OF URINE SODIUM: CPT

## 2019-06-06 PROCEDURE — A9270 NON-COVERED ITEM OR SERVICE: HCPCS | Performed by: HOSPITALIST

## 2019-06-06 PROCEDURE — 700105 HCHG RX REV CODE 258: Performed by: EMERGENCY MEDICINE

## 2019-06-06 PROCEDURE — 36415 COLL VENOUS BLD VENIPUNCTURE: CPT

## 2019-06-06 PROCEDURE — 700101 HCHG RX REV CODE 250: Performed by: INTERNAL MEDICINE

## 2019-06-06 PROCEDURE — A9270 NON-COVERED ITEM OR SERVICE: HCPCS | Performed by: INTERNAL MEDICINE

## 2019-06-06 PROCEDURE — 80048 BASIC METABOLIC PNL TOTAL CA: CPT

## 2019-06-06 PROCEDURE — 81001 URINALYSIS AUTO W/SCOPE: CPT

## 2019-06-06 PROCEDURE — 82043 UR ALBUMIN QUANTITATIVE: CPT

## 2019-06-06 RX ORDER — SUMATRIPTAN 50 MG/1
50 TABLET, FILM COATED ORAL 2 TIMES DAILY PRN
Refills: 0 | Status: ON HOLD | COMMUNITY
Start: 2019-04-18 | End: 2019-06-08

## 2019-06-06 RX ORDER — SIMVASTATIN 20 MG
20 TABLET ORAL EVERY EVENING
Status: DISCONTINUED | OUTPATIENT
Start: 2019-06-06 | End: 2019-06-08 | Stop reason: HOSPADM

## 2019-06-06 RX ORDER — OXYCODONE HYDROCHLORIDE AND ACETAMINOPHEN 5; 325 MG/1; MG/1
1-2 TABLET ORAL EVERY 4 HOURS PRN
Status: DISCONTINUED | OUTPATIENT
Start: 2019-06-06 | End: 2019-06-06

## 2019-06-06 RX ORDER — AMOXICILLIN 250 MG
2 CAPSULE ORAL 2 TIMES DAILY
Status: DISCONTINUED | OUTPATIENT
Start: 2019-06-06 | End: 2019-06-08 | Stop reason: HOSPADM

## 2019-06-06 RX ORDER — ESCITALOPRAM OXALATE 10 MG/1
10 TABLET ORAL DAILY
Status: DISCONTINUED | OUTPATIENT
Start: 2019-06-06 | End: 2019-06-08 | Stop reason: HOSPADM

## 2019-06-06 RX ORDER — SODIUM CHLORIDE AND POTASSIUM CHLORIDE 150; 900 MG/100ML; MG/100ML
INJECTION, SOLUTION INTRAVENOUS CONTINUOUS
Status: DISCONTINUED | OUTPATIENT
Start: 2019-06-06 | End: 2019-06-08 | Stop reason: HOSPADM

## 2019-06-06 RX ORDER — GABAPENTIN 100 MG/1
200 CAPSULE ORAL 2 TIMES DAILY
Status: DISCONTINUED | OUTPATIENT
Start: 2019-06-06 | End: 2019-06-08 | Stop reason: HOSPADM

## 2019-06-06 RX ORDER — BISACODYL 10 MG
10 SUPPOSITORY, RECTAL RECTAL
Status: DISCONTINUED | OUTPATIENT
Start: 2019-06-06 | End: 2019-06-08 | Stop reason: HOSPADM

## 2019-06-06 RX ORDER — ALBUTEROL SULFATE 90 UG/1
2 AEROSOL, METERED RESPIRATORY (INHALATION) EVERY 6 HOURS PRN
Status: DISCONTINUED | OUTPATIENT
Start: 2019-06-06 | End: 2019-06-08 | Stop reason: HOSPADM

## 2019-06-06 RX ORDER — OXYCODONE HYDROCHLORIDE AND ACETAMINOPHEN 5; 325 MG/1; MG/1
1-2 TABLET ORAL EVERY 4 HOURS PRN
Status: DISCONTINUED | OUTPATIENT
Start: 2019-06-06 | End: 2019-06-08 | Stop reason: HOSPADM

## 2019-06-06 RX ORDER — SODIUM CHLORIDE 9 MG/ML
500 INJECTION, SOLUTION INTRAVENOUS ONCE
Status: COMPLETED | OUTPATIENT
Start: 2019-06-06 | End: 2019-06-06

## 2019-06-06 RX ORDER — HEPARIN SODIUM 5000 [USP'U]/ML
5000 INJECTION, SOLUTION INTRAVENOUS; SUBCUTANEOUS EVERY 8 HOURS
Status: DISCONTINUED | OUTPATIENT
Start: 2019-06-06 | End: 2019-06-07

## 2019-06-06 RX ORDER — ONDANSETRON 2 MG/ML
4 INJECTION INTRAMUSCULAR; INTRAVENOUS EVERY 4 HOURS PRN
Status: DISCONTINUED | OUTPATIENT
Start: 2019-06-06 | End: 2019-06-08 | Stop reason: HOSPADM

## 2019-06-06 RX ORDER — SODIUM CHLORIDE 9 MG/ML
INJECTION, SOLUTION INTRAVENOUS CONTINUOUS
Status: DISCONTINUED | OUTPATIENT
Start: 2019-06-06 | End: 2019-06-06

## 2019-06-06 RX ORDER — MORPHINE SULFATE 15 MG/1
15 TABLET, FILM COATED, EXTENDED RELEASE ORAL EVERY 12 HOURS
Refills: 0 | COMMUNITY
Start: 2019-04-18 | End: 2020-07-19

## 2019-06-06 RX ORDER — GABAPENTIN 400 MG/1
400 CAPSULE ORAL 2 TIMES DAILY
Status: DISCONTINUED | OUTPATIENT
Start: 2019-06-06 | End: 2019-06-06

## 2019-06-06 RX ORDER — POLYETHYLENE GLYCOL 3350 17 G/17G
1 POWDER, FOR SOLUTION ORAL
Status: DISCONTINUED | OUTPATIENT
Start: 2019-06-06 | End: 2019-06-08 | Stop reason: HOSPADM

## 2019-06-06 RX ADMIN — SODIUM CHLORIDE: 9 INJECTION, SOLUTION INTRAVENOUS at 15:09

## 2019-06-06 RX ADMIN — SIMVASTATIN 20 MG: 20 TABLET, FILM COATED ORAL at 18:19

## 2019-06-06 RX ADMIN — OXYCODONE HYDROCHLORIDE AND ACETAMINOPHEN 1 TABLET: 5; 325 TABLET ORAL at 03:29

## 2019-06-06 RX ADMIN — OXYCODONE HYDROCHLORIDE AND ACETAMINOPHEN 1 TABLET: 5; 325 TABLET ORAL at 21:08

## 2019-06-06 RX ADMIN — GABAPENTIN 400 MG: 400 CAPSULE ORAL at 05:24

## 2019-06-06 RX ADMIN — ONDANSETRON 4 MG: 2 INJECTION INTRAMUSCULAR; INTRAVENOUS at 16:32

## 2019-06-06 RX ADMIN — GABAPENTIN 200 MG: 100 CAPSULE ORAL at 18:19

## 2019-06-06 RX ADMIN — POTASSIUM CHLORIDE AND SODIUM CHLORIDE: 900; 150 INJECTION, SOLUTION INTRAVENOUS at 16:35

## 2019-06-06 RX ADMIN — HEPARIN SODIUM 5000 UNITS: 5000 INJECTION, SOLUTION INTRAVENOUS; SUBCUTANEOUS at 21:08

## 2019-06-06 RX ADMIN — SODIUM CHLORIDE: 9 INJECTION, SOLUTION INTRAVENOUS at 03:29

## 2019-06-06 RX ADMIN — SENNOSIDES,DOCUSATE SODIUM 2 TABLET: 8.6; 5 TABLET, FILM COATED ORAL at 18:19

## 2019-06-06 RX ADMIN — OXYCODONE HYDROCHLORIDE AND ACETAMINOPHEN 1 TABLET: 5; 325 TABLET ORAL at 16:32

## 2019-06-06 RX ADMIN — OXYCODONE HYDROCHLORIDE AND ACETAMINOPHEN 1 TABLET: 5; 325 TABLET ORAL at 09:21

## 2019-06-06 RX ADMIN — HEPARIN SODIUM 5000 UNITS: 5000 INJECTION, SOLUTION INTRAVENOUS; SUBCUTANEOUS at 05:23

## 2019-06-06 RX ADMIN — SODIUM CHLORIDE 500 ML: 9 INJECTION, SOLUTION INTRAVENOUS at 01:02

## 2019-06-06 RX ADMIN — CEFTRIAXONE SODIUM 2 G: 2 INJECTION, POWDER, FOR SOLUTION INTRAMUSCULAR; INTRAVENOUS at 05:18

## 2019-06-06 RX ADMIN — HEPARIN SODIUM 5000 UNITS: 5000 INJECTION, SOLUTION INTRAVENOUS; SUBCUTANEOUS at 13:23

## 2019-06-06 RX ADMIN — SENNOSIDES,DOCUSATE SODIUM 2 TABLET: 8.6; 5 TABLET, FILM COATED ORAL at 05:24

## 2019-06-06 RX ADMIN — ESCITALOPRAM OXALATE 10 MG: 10 TABLET ORAL at 05:24

## 2019-06-06 ASSESSMENT — LIFESTYLE VARIABLES
EVER_SMOKED: YES
ALCOHOL_USE: NO
EVER_SMOKED: YES

## 2019-06-06 ASSESSMENT — COGNITIVE AND FUNCTIONAL STATUS - GENERAL
DAILY ACTIVITIY SCORE: 23
TURNING FROM BACK TO SIDE WHILE IN FLAT BAD: A LITTLE
MOBILITY SCORE: 18
MOBILITY SCORE: 16
CLIMB 3 TO 5 STEPS WITH RAILING: A LOT
TURNING FROM BACK TO SIDE WHILE IN FLAT BAD: A LITTLE
PERSONAL GROOMING: A LITTLE
SUGGESTED CMS G CODE MODIFIER DAILY ACTIVITY: CI
EATING MEALS: A LITTLE
CLIMB 3 TO 5 STEPS WITH RAILING: A LITTLE
DRESSING REGULAR LOWER BODY CLOTHING: A LITTLE
STANDING UP FROM CHAIR USING ARMS: A LITTLE
TOILETING: A LITTLE
MOVING FROM LYING ON BACK TO SITTING ON SIDE OF FLAT BED: A LITTLE
STANDING UP FROM CHAIR USING ARMS: A LITTLE
HELP NEEDED FOR BATHING: A LITTLE
SUGGESTED CMS G CODE MODIFIER MOBILITY: CK
DRESSING REGULAR UPPER BODY CLOTHING: A LITTLE
MOVING TO AND FROM BED TO CHAIR: A LITTLE
WALKING IN HOSPITAL ROOM: A LITTLE
HELP NEEDED FOR BATHING: A LITTLE
SUGGESTED CMS G CODE MODIFIER DAILY ACTIVITY: CK
DAILY ACTIVITIY SCORE: 18
WALKING IN HOSPITAL ROOM: A LOT
SUGGESTED CMS G CODE MODIFIER MOBILITY: CK
MOVING FROM LYING ON BACK TO SITTING ON SIDE OF FLAT BED: A LITTLE
MOVING TO AND FROM BED TO CHAIR: A LITTLE

## 2019-06-06 ASSESSMENT — PATIENT HEALTH QUESTIONNAIRE - PHQ9
1. LITTLE INTEREST OR PLEASURE IN DOING THINGS: NOT AT ALL
2. FEELING DOWN, DEPRESSED, IRRITABLE, OR HOPELESS: NOT AT ALL
SUM OF ALL RESPONSES TO PHQ9 QUESTIONS 1 AND 2: 0

## 2019-06-06 ASSESSMENT — COPD QUESTIONNAIRES
IN THE PAST 12 MONTHS DO YOU DO LESS THAN YOU USED TO BECAUSE OF YOUR BREATHING PROBLEMS: DISAGREE/UNSURE
HAVE YOU SMOKED AT LEAST 100 CIGARETTES IN YOUR ENTIRE LIFE: NO/DON'T KNOW
COPD SCREENING SCORE: 2
DURING THE PAST 4 WEEKS HOW MUCH DID YOU FEEL SHORT OF BREATH: NONE/LITTLE OF THE TIME
HAVE YOU SMOKED AT LEAST 100 CIGARETTES IN YOUR ENTIRE LIFE: YES
COPD SCREENING SCORE: 4
DO YOU EVER COUGH UP ANY MUCUS OR PHLEGM?: NO/ONLY WITH OCCASIONAL COLDS OR INFECTIONS
DO YOU EVER COUGH UP ANY MUCUS OR PHLEGM?: NO/ONLY WITH OCCASIONAL COLDS OR INFECTIONS
DURING THE PAST 4 WEEKS HOW MUCH DID YOU FEEL SHORT OF BREATH: NONE/LITTLE OF THE TIME

## 2019-06-06 ASSESSMENT — GAIT ASSESSMENTS
ASSISTIVE DEVICE: FRONT WHEEL WALKER
DEVIATION: BRADYKINETIC;SHUFFLED GAIT
GAIT LEVEL OF ASSIST: SUPERVISED
DISTANCE (FEET): 100

## 2019-06-06 ASSESSMENT — ACTIVITIES OF DAILY LIVING (ADL): TOILETING: INDEPENDENT

## 2019-06-06 NOTE — PROGRESS NOTES
Monitor Summary  Rhythm/Rate: Normal Sinus Rhythm 74-76  Ectopy: None  VT: 0.12 QRS:0.08 QT:0.36

## 2019-06-06 NOTE — ASSESSMENT & PLAN NOTE
Etiology not entirely clear could be secondary to poor intake  Blood pressures on the low side we will hold her home blood pressure medications  Was started on IV fluids for hydration  We will check renal ultrasound and urinalysis and sodium and protein

## 2019-06-06 NOTE — PROGRESS NOTES
Bedside report rec'd, assumed care of pt, 12 hr CC completed. Pt resting comfortably in bed. When awoken to assess, pt asked for pain meds before even opening eyes. Within 30 seconds was asleep and snoring again. Will continue to assess for need of pain meds. IVF infusing. White board updated.

## 2019-06-06 NOTE — ED TRIAGE NOTES
"Elina Rushingr  59 y.o. female  Chief Complaint   Patient presents with   • Headache     Pt reports she has a hx of brain aneurysm. Pt was scheduled to get a MRI tomorrow of her brain. Today at noon patient suddenly got the worse HA of her life and has since been off balance   • Loss Of Balance       Pt wheeled to triage for above complaint.     Charge RN made aware of this patient. Pt roomed to [facility] from triage. Report to Sarina Rn and Thom RN.       Blood Pressure: (!) 94/58, Pulse: 68, Respiration: 16, Temperature: 36.5 °C (97.7 °F), Height: 162.6 cm (5' 4\"), Weight: 61.1 kg (134 lb 11.2 oz), BMI (Calculated): 23.12, BSA (Calculated): 1.7, Pulse Oximetry: 95 %, O2 Delivery: None (Room Air)    "

## 2019-06-06 NOTE — PROGRESS NOTES
[facility] MEDICINE PROGRESS NOTE    Date of service  06/06/19    Chief Complaint  Altered level consciousness    Interval History:  59 y.o. year old female here with altered level consciousness and acute kidney injury    Consultants/Specialty  None    Review of Systems    Review of Systems   Unable to perform ROS: Mental status change         Physical Exam   Vitals:    06/06/19 0400 06/06/19 0808 06/06/19 1235 06/06/19 1551   BP: 104/55 110/67 100/59 117/68   Pulse: 79 74 78 73   Resp: 16 18 18 20   Temp: 36.3 °C (97.4 °F) 36.7 °C (98 °F) 36.8 °C (98.2 °F) 37.5 °C (99.5 °F)   TempSrc: Temporal Temporal Temporal Oral   SpO2: 98% 93% 92% 94%   Weight:       Height:           Examination was done and no changes were present from yesterday, 6/6/2019, note except for:  Physical Exam   Constitutional: She is oriented to person, place, and time and well-developed, well-nourished, and in no distress. No distress.   He is very somnolent and would fall back asleep but arousable and responds appropriately to command.   HENT:   Head: Normocephalic and atraumatic.   Eyes: Pupils are equal, round, and reactive to light. Conjunctivae are normal. No scleral icterus.   Neck: Normal range of motion. Neck supple. No JVD present. No tracheal deviation present.   Cardiovascular: Normal rate, regular rhythm and intact distal pulses.  Exam reveals no gallop and no friction rub.    No murmur heard.  Pulmonary/Chest: Effort normal and breath sounds normal. No respiratory distress. She has no wheezes. She has no rales. She exhibits no tenderness.   Abdominal: Soft. Bowel sounds are normal. She exhibits no distension and no mass. There is no tenderness. There is no rebound and no guarding.   Musculoskeletal: Normal range of motion. She exhibits no edema or tenderness.   Neurological: She is oriented to person, place, and time. She has normal reflexes. She exhibits normal muscle tone. Coordination normal.   Skin: Skin is warm and dry. She is  not diaphoretic.   Nursing note and vitals reviewed.       Laboratory  Hematology   Lab Results   Component Value Date/Time    WBC 7.9 06/05/2019 09:36 PM    HEMOGLOBIN 13.1 06/05/2019 09:36 PM    HEMATOCRIT 41.5 06/05/2019 09:36 PM    PLATELETCT 331 06/05/2019 09:36 PM         Laboratory data not explicitly included in this progress note were reviewed by me.     Chemistry   Lab Results   Component Value Date/Time    SODIUM 143 06/06/2019 12:37 PM    POTASSIUM 3.5 (L) 06/06/2019 12:37 PM    CHLORIDE 109 06/06/2019 12:37 PM    CO2 26 06/06/2019 12:37 PM    GLUCOSE 120 (H) 06/06/2019 12:37 PM    BUN 12 06/06/2019 12:37 PM    CREATININE 0.76 06/06/2019 12:37 PM    CREATININE 0.7 02/04/2007 03:50 AM         Radiology  US-RENAL  Narrative: 6/6/2019 4:15 AM    HISTORY/REASON FOR EXAM:  Abnormal Labs    TECHNIQUE/EXAM DESCRIPTION:  Renal ultrasound.    COMPARISON:  Correlation to CT exam 12/5/2015    FINDINGS:  The right kidney measures 10.40 cm.  The left kidney measures 10.70 cm.    There is no hydronephrosis.  There are no abnormal calcifications.    The bladder demonstrates no focal wall abnormality.  The left ureteral jet is seen. The right jet is not visualized  Impression: Negative renal ultrasound.  DX-CHEST-PORTABLE (1 VIEW)  Narrative: 6/5/2019 11:08 PM    HISTORY/REASON FOR EXAM:  Cough.  Imbalance, cough, headache    TECHNIQUE/EXAM DESCRIPTION AND NUMBER OF VIEWS:  Single portable view of the chest.    COMPARISON: 10/19/2018    FINDINGS:    Cardiac silhouette is borderline-enlarged. Mild nonspecific elevation of the right hemidiaphragm.    Possible retrocardiac left lower lobe opacity which could represent atelectasis, infiltrate or edema. No significant pleural effusion or pneumothorax.    The spinal fusion hardware is seen.  Impression: Possible mild left basilar pulmonary opacity which could represent atelectasis or infiltrate.      Imaging results not explicitly included in this progress note were reviewed  by me.     Assessment/Plan    ALOC  Possible UTI   COLT (acute kidney injury) (HCC) (6/6/2019)   HTN (hypertension) (6/13/2015)   Chronic pain (6/11/2012)   Smoker (6/13/2015)   Depression (6/9/2012)   Intracerebral aneurysm (5/13/2019)  Jaw swelling    According to her daughters, family members in the room, her niece, the patient have been sleepy and somnolent in the last few days.  It is unclear which come first the acute kidney injury or the altered level consciousness causing acute kidney injury.  In any event, work-up so far has been negative.  CT head has personally reviewed by myself show no acute pathology, mass, or bleed.  Unable to locate the aneurysm that the patient reported.    More than likely, she was recently started on a new benzodiazepine, which in combination with her other long-acting and short acting narcotic because the change in her mental status which call severe dehydration, evidence in her acute kidney injury and severely concentrated urine and low urine sodium.    Keep the CFTX IV for now and await urine culture and see if her mentation clear up.    I will continue to hold her long-acting narcotics, decrease her Neurontin to 200 mg p.o. twice daily, avoid other benzodiazepine, and allow for her to metabolize to freedom.    I think her kidney would probably make a full recovery given that she has no previous history of kidney failure prior to presentation.  We will continue supportive care, IV fluid, and encourage oral intake as she will allow out.    Her jaw swelling could potentially be from a dental problem.  Will monitor and order imaging if worsened.    With regard to her history of an intracranial cerebral aneurysm, I am unable to locate it on the head CT.  Patient have requested to speak to her neurosurgeon, Dr. Sorto, because she was supposed to see him in clinic today.  I have discussed the request with Dr. Sorto.      DVT prophylaxis: SC heparin Q 8    CODE STATUS:  Full  Code    Disposition: Dissipate to be home in the next 2 to 3 days if clinically improved.

## 2019-06-06 NOTE — ASSESSMENT & PLAN NOTE
We will hold her extended release morphine given her dizziness and lethargy  Continue with as needed oxycodone

## 2019-06-06 NOTE — PROGRESS NOTES
Pt's family provided inorfrmation that outpt neurosx follow up with with Dr Sorto. Family requesting that he be consulted. Will discuss with MD during bedside rounds.

## 2019-06-06 NOTE — ED TRIAGE NOTES
Patient presented to the ED via triage for Headache, started around noon today.  Her recent MRI demonstrated an intracranial aneurysm 2 mm saccular aneurysm left cavernous segment of the ICA. On monitor, chart up for ERP

## 2019-06-06 NOTE — ASSESSMENT & PLAN NOTE
Reviewed importance of smoking cessation  She is scheduled to follow-up with neurosurgery for evaluation as an outpatient  CT head reviewed negative for acute intracranial pathology

## 2019-06-06 NOTE — ED NOTES
Attempted to obtain UA unable to, patient +1 assist to bedside commode, family at bedside, in NAD, AOx4

## 2019-06-06 NOTE — ED NOTES
Called tele    Transferred patient to bedside commode.  Patient tolerated movement.  UA sent to lab, daughter at bedside.

## 2019-06-06 NOTE — ED PROVIDER NOTES
ED Provider Note    ER PROVIDER NOTE      CHIEF COMPLAINT  Chief Complaint   Patient presents with   • Headache     Pt reports she has a hx of brain aneurysm. Pt was scheduled to get a MRI tomorrow of her brain. Today at noon patient suddenly got the worse HA of her life and has since been off balance   • Loss Of Balance       HPI  Elina Skaggs is a 59 y.o. female who presents to the emergency department complaining of Headache.  Patient reports she has had headache essentially for 9 months, she was diagnosed with a brain aneurysm and is quite worried about this.  Triage note reports acutely worse today although patient clearly states this has been progressively worsening over the 9 months.  Denies any neck pain, no nausea or vomiting.  Visual symptoms, no focal weakness numbness or tingling.  She also reports she has had progressive difficulties with balance and memory issues over the last few months.  Per family report, patient does not seem to be eating or drinking very much either and is often confused    On gabapentin, Valium, oxycodone    REVIEW OF SYSTEMS  Pertinent positives include headache. Pertinent negatives include no vomiting. See HPI for details. All other systems reviewed and are negative.    PAST MEDICAL HISTORY   has a past medical history of Anxiety (1993); Chronic back pain; Constipation (9/27/2013); Dental disorder; Hyperlipidemia; Hypertension; Impaired physical mobility (9/30/2013); MEDICAL HOME (6/2015); Psychiatric problem; Sleep apnea; and Snoring.    SURGICAL HISTORY   has a past surgical history that includes tubal coagulation laparoscopic bilateral; appendectomy; appendectomy child (1974); cervical disk and fusion anterior (11/16/2010); cervical fusion posterior (6/19/2012); and colonoscopy with polyp (4/1/13).    FAMILY HISTORY  Family History   Problem Relation Age of Onset   • Cancer Mother         lung cancer   • Heart Disease Father         MI   • Diabetes Father    •  "Genitourinary () Father         renal failure on dialysis   • Diabetes Sister    • Genitourinary () Sister         renal failure   • Diabetes Brother        SOCIAL HISTORY  Social History     Social History   • Marital status: Legally      Spouse name: N/A   • Number of children: N/A   • Years of education: N/A     Occupational History   • [employer]     Social History Main Topics   • Smoking status: Current Every Day Smoker     Packs/day: 0.40     Years: 37.00     Types: Cigarettes   • Smokeless tobacco: Never Used      Comment: 4 cig/day with chantix   • Alcohol use No   • Drug use: No   • Sexual activity: No     Other Topics Concern   • Not on file     Social History Narrative    ** Merged History Encounter **           History   Drug Use No       CURRENT MEDICATIONS  Home Medications     Reviewed by Thom Moss R.N. (Registered Nurse) on 06/05/19 at 2139  Med List Status: Partial   Medication Last Dose Status   albuterol 108 (90 Base) MCG/ACT Aero Soln inhalation aerosol  Active   amLODIPine (NORVASC) 5 MG Tab  Active   amoxicillin-clavulanate (AUGMENTIN) 875-125 MG Tab  Active   enalapril (VASOTEC) 20 MG tablet  Active   escitalopram (LEXAPRO) 10 MG Tab  Active   gabapentin (NEURONTIN) 800 MG tablet  Active   oxycodone-acetaminophen (PERCOCET) 5-325 MG Tab  Active   potassium chloride SA (KDUR) 20 MEQ Tab CR  Active   simvastatin (ZOCOR) 20 MG Tab  Active   tizanidine (ZANAFLEX) 4 MG TABS  Active   XTAMPZA ER 9 MG Capsule Extended Release 12 hour Abuse-Deterrent  Active                ALLERGIES  No Known Allergies    PHYSICAL EXAM  VITAL SIGNS: BP (!) 94/58   Pulse 74   Temp 36.5 °C (97.7 °F) (Temporal)   Resp 15   Ht 1.626 m (5' 4\")   Wt 61.1 kg (134 lb 11.2 oz)   SpO2 98%   BMI 23.12 kg/m²   Pulse ox interpretation: I interpret this pulse ox as normal.    Constitutional: Alert anxious  HENT: No signs of trauma, Bilateral external ears normal, Nose " normal.  Mucous membranes dry  Eyes: Pupils are equal and reactive, Conjunctiva normal, Non-icteric.   Neck: Normal range of motion, No tenderness, Supple, No stridor.   Lymphatic: No lymphadenopathy noted.   Cardiovascular: Regular rate and rhythm, no murmurs.   Thorax & Lungs: Normal breath sounds, No respiratory distress, No wheezing, No chest tenderness.   Abdomen: Bowel sounds normal, Soft, No tenderness, No masses, No pulsatile masses. No peritoneal signs.  Skin: Warm, Dry, No erythema, No rash.   Back: No bony tenderness, No CVA tenderness.   Extremities: Intact distal pulses, No edema, No tenderness, No cyanosis, Negative Enedina's sign.  Musculoskeletal: Good range of motion in all major joints. No tenderness to palpation or major deformities noted.   Neurologic: Alert, cranial nerves intact, speech is appropriate or not slurred, upper extremities bilaterally exhibit no drift, no dysmetria, 5 out of 5 strength with bilateral bicep/tricep/, sensation intact to light touch throughout upper extremities. Lower extremities strength 5 out of 5 thigh extension/flexion/abduction/adduction, knee extension/flexion, dorsiflexion plantar flexion. No clonus.  2+ patella reflexes.  sensation intact to light touch.  No focal deficits noted.   Psychiatric: Anxious, tearful    DIAGNOSTIC STUDIES / PROCEDURES    Results for orders placed or performed during the hospital encounter of 06/05/19   CBC WITH DIFFERENTIAL   Result Value Ref Range    WBC 7.9 4.8 - 10.8 K/uL    RBC 4.43 4.20 - 5.40 M/uL    Hemoglobin 13.1 12.0 - 16.0 g/dL    Hematocrit 41.5 37.0 - 47.0 %    MCV 93.7 81.4 - 97.8 fL    MCH 29.6 27.0 - 33.0 pg    MCHC 31.6 (L) 33.6 - 35.0 g/dL    RDW 47.8 35.9 - 50.0 fL    Platelet Count 331 164 - 446 K/uL    MPV 9.9 9.0 - 12.9 fL    Neutrophils-Polys 38.80 (L) 44.00 - 72.00 %    Lymphocytes 49.20 (H) 22.00 - 41.00 %    Monocytes 9.00 0.00 - 13.40 %    Eosinophils 2.00 0.00 - 6.90 %    Basophils 0.90 0.00 - 1.80 %     Immature Granulocytes 0.10 0.00 - 0.90 %    Nucleated RBC 0.00 /100 WBC    Neutrophils (Absolute) 3.07 2.00 - 7.15 K/uL    Lymphs (Absolute) 3.90 1.00 - 4.80 K/uL    Monos (Absolute) 0.71 0.00 - 0.85 K/uL    Eos (Absolute) 0.16 0.00 - 0.51 K/uL    Baso (Absolute) 0.07 0.00 - 0.12 K/uL    Immature Granulocytes (abs) 0.01 0.00 - 0.11 K/uL    NRBC (Absolute) 0.00 K/uL   COMP METABOLIC PANEL   Result Value Ref Range    Sodium 139 135 - 145 mmol/L    Potassium 4.2 3.6 - 5.5 mmol/L    Chloride 103 96 - 112 mmol/L    Co2 29 20 - 33 mmol/L    Anion Gap 7.0 0.0 - 11.9    Glucose 88 65 - 99 mg/dL    Bun 16 8 - 22 mg/dL    Creatinine 2.24 (H) 0.50 - 1.40 mg/dL    Calcium 9.4 8.5 - 10.5 mg/dL    AST(SGOT) 16 12 - 45 U/L    ALT(SGPT) 15 2 - 50 U/L    Alkaline Phosphatase 80 30 - 99 U/L    Total Bilirubin 0.4 0.1 - 1.5 mg/dL    Albumin 4.3 3.2 - 4.9 g/dL    Total Protein 7.4 6.0 - 8.2 g/dL    Globulin 3.1 1.9 - 3.5 g/dL    A-G Ratio 1.4 g/dL   ESTIMATED GFR   Result Value Ref Range    GFR If  27 (A) >60 mL/min/1.73 m 2    GFR If Non African American 22 (A) >60 mL/min/1.73 m 2   TROPONIN   Result Value Ref Range    Troponin I <0.01 0.00 - 0.04 ng/mL   LACTIC ACID   Result Value Ref Range    Lactic Acid 1.0 0.5 - 2.0 mmol/L   EKG   Result Value Ref Range    Report       [facility] Emergency Dept[facility]    Test Date:  2019  Pt Name:    CHAYA KRISHNA               Department: ER  MRN:        [id number]                      Room:       RD 11  Gender:     Female                       Technician: [id number]  :        [id number]                   Requested By:ER TRIAGE PROTOCOL  Order #:    [id number]                    Reading MD: TAWANNA CONNOR MD    Measurements  Intervals                                Axis  Rate:       66                           P:          0  MS:         138                          QRS:        45  QRSD:       82                           T:          31  QT:         428  QTc:         449    Interpretive Statements  SINUS RHYTHM  Compared to ECG 06/11/2012 09:38:24  T-wave abnormality no longer present    Electronically Signed On 6-6-2019 1:46:30 PDT by TAWANNA CONNOR MD           RADIOLOGY  DX-CHEST-PORTABLE (1 VIEW)   Final Result      Possible mild left basilar pulmonary opacity which could represent atelectasis or infiltrate.      CT-HEAD W/O   Final Result      Normal noncontrast head CT.      US-RENAL    (Results Pending)     The radiologist's interpretation of all radiological studies have been reviewed by me.    COURSE & MEDICAL DECISION MAKING  Nursing notes, VS, PMSFHx reviewed in chart.    10:03 PM Patient seen and examined at bedside. Patient will be treated with fentanyl. Ordered for CT head, labs to evaluate her symptoms.     10:33 PM patient reevaluated, her blood pressure has dropped, will order for fluids, additional work-up, updated patient and family on results, she is somewhat sleepy after the pain medication but easily arousable    10:58 PM  Patient reevaluated, blood pressure has improved with fluids, pending results at this time      12:09 AM  She reevaluated again, systolics in the 100s, patient is resting comfortably    Discussed case with Dr. Arias for admission        The total critical care time spent on this patient was 40 minutes, resuscitating patient, speaking with admitting physician, and interpreting test results. This 40 minutes is exclusive of separately billable procedures.          HYDRATION: Based on the patient's presentation of Dehydration and Hypotension the patient was given IV fluids. IV Hydration was used because oral hydration was not as rapid as required. Upon recheck following hydration, the patient was improved.    Decision Making:  This is a 59 y.o. female presenting with continued headache as well as hypotension and acute renal dysfunction.  Regarding patient's hypotension my sense is this is volume related as patient has not been eating  and drinking well, and she has been fluid responsive.  Additionally with her acute kidney injury this does seem consistent.  There is no overt evidence of cardiac dysfunction leading to this or acute infectious process.  She has been started on IV fluid resuscitation and admitted for further care and evaluation of this.  Regarding patient's headaches this does seem to be a more chronic problem for the patient.  CT was obtained as she did have past history of aneurysm, this is unremarkable without any new bleed, and with the more chronic nature of her symptoms I do not suspect acute subarachnoid hemorrhage or other acute intracranial pathology    Patient is admitted in guarded condition    FINAL IMPRESSION  1. Nonintractable headache, unspecified chronicity pattern, unspecified headache type    2. Acute kidney injury (HCC)    3. Hypotension due to hypovolemia         The note accurately reflects work and decisions made by me.  Steve Muro  6/6/2019  1:49 AM

## 2019-06-06 NOTE — ASSESSMENT & PLAN NOTE
Patient counseled on importance of smoking cessation  Total time spent counseling patient on cessation was 5 minutes

## 2019-06-06 NOTE — ED NOTES
Patient to CT, family at bedside,     Patient is lethargic, AOx4, states decreased sensation on the left side. In NAD, will continue to monitor.

## 2019-06-06 NOTE — H&P
Hospital Medicine History & Physical Note    Date of Service  6/6/2019    Primary Care Physician  Nancy Hunt M.D.    Consultants  None    Code Status  Full    Chief Complaint  Generalized malaise headache dizziness    History of Presenting Illness  59 y.o. female who presented 6/5/2019 with history of headache for the past 9 to 12-month presented to the emergency room for evaluation of generalized malaise dizziness and fatigue along with persistent headache.  She was recently worked up with an MRI in April which revealed sphenoid sinusitis and treated with 2-week course of Augmentin she was also noted to have a 2 mm saccular aneurysm and has been referred to neurosurgery .  Patient has a history of degenerative joint disease has been maintained on chronic narcotics and follows up with pain management.  According to her daughter she is on extended release morphine sulfate once a day 15 mg as well as oxycodone 10 mg 4 times a day.  She has history of depression anxiety and follows up with psychiatry and recently started on lorazepam.  She has been having poor intake over the past few days.  No nausea vomiting no diarrhea.  No dysuria gross hematuria.  No known history of kidney disease and her last serum creatinine available for review 1 year ago was normal with a normal GFR.  No acute changes in vision no true vertigo no focal weakness or numbness      Review of Systems  Review of Systems   All other systems reviewed and are negative.      Past Medical History   has a past medical history of Anxiety (1993); Chronic back pain; Constipation (9/27/2013); Dental disorder; Hyperlipidemia; Hypertension; Impaired physical mobility (9/30/2013); MEDICAL HOME (6/2015); Psychiatric problem; Sleep apnea; and Snoring.    Surgical History   has a past surgical history that includes tubal coagulation laparoscopic bilateral; appendectomy; appendectomy child (1974); cervical disk and fusion anterior (11/16/2010); cervical fusion  posterior (6/19/2012); and colonoscopy with polyp (4/1/13).     Family History  family history includes Cancer in her mother; Diabetes in her brother, father, and sister; Genitourinary () in her father and sister; Heart Disease in her father.     Social History   reports that she has been smoking Cigarettes.  She has a 14.80 pack-year smoking history. She has never used smokeless tobacco. She reports that she does not drink alcohol or use drugs.    Allergies  No Known Allergies    Medications  Prior to Admission Medications   Prescriptions Last Dose Informant Patient Reported? Taking?   XTAMPZA ER 9 MG Capsule Extended Release 12 hour Abuse-Deterrent   Yes No   albuterol 108 (90 Base) MCG/ACT Aero Soln inhalation aerosol   No No   Sig: Inhale 2 Puffs by mouth every 6 hours as needed for Shortness of Breath.   amLODIPine (NORVASC) 5 MG Tab   No No   Sig: Take 1 Tab by mouth every day.   amoxicillin-clavulanate (AUGMENTIN) 875-125 MG Tab   No No   Sig: Take 1 Tab by mouth 2 times a day.   enalapril (VASOTEC) 20 MG tablet   No No   Sig: Take 1 Tab by mouth every day.   escitalopram (LEXAPRO) 10 MG Tab   Yes No   Sig: TK 1 T PO QD   gabapentin (NEURONTIN) 800 MG tablet   Yes No   Sig: Take 800 mg by mouth 3 times a day.   oxycodone-acetaminophen (PERCOCET) 5-325 MG Tab   No No   Sig: Take 1-2 Tabs by mouth every four hours as needed.   potassium chloride SA (KDUR) 20 MEQ Tab CR   No No   Sig: Take 1 Tab by mouth every day.   simvastatin (ZOCOR) 20 MG Tab   No No   Sig: Take 1 Tab by mouth every evening. N THE EVENING   tizanidine (ZANAFLEX) 4 MG TABS  Patient's Home Pharmacy Yes No   Sig: Take 4 mg by mouth 3 times a day.      Facility-Administered Medications: None       Physical Exam  Temp:  [36.5 °C (97.7 °F)] 36.5 °C (97.7 °F)  Pulse:  [66-74] 74  Resp:  [15-20] 15  BP: (94)/(58) 94/58  SpO2:  [95 %-98 %] 98 %    Physical Exam   Constitutional: She is oriented to person, place, and time. She appears well-developed  and well-nourished.   HENT:   Head: Normocephalic and atraumatic.   Right Ear: External ear normal.   Left Ear: External ear normal.   Mouth/Throat: No oropharyngeal exudate.   Eyes: Conjunctivae are normal. Right eye exhibits no discharge. Left eye exhibits no discharge. No scleral icterus.   Neck: Neck supple. No JVD present. No tracheal deviation present.   Cardiovascular: Normal rate and regular rhythm.  Exam reveals no gallop and no friction rub.    No murmur heard.  Pulmonary/Chest: Effort normal and breath sounds normal. No stridor. No respiratory distress. She has no wheezes. She has no rales. She exhibits no tenderness.   Abdominal: Soft. Bowel sounds are normal. She exhibits no distension. There is no tenderness. There is no rebound.   Musculoskeletal: She exhibits tenderness. She exhibits no edema.   Neurological: She is oriented to person, place, and time. No cranial nerve deficit. She exhibits normal muscle tone.   Patient is asleep she is arousable   Skin: Skin is warm and dry. She is not diaphoretic. No cyanosis. Nails show no clubbing.   Psychiatric: Her speech is delayed. She is slowed. She exhibits a depressed mood.   Nursing note and vitals reviewed.      Laboratory:  Recent Labs      06/05/19   2136   WBC  7.9   RBC  4.43   HEMOGLOBIN  13.1   HEMATOCRIT  41.5   MCV  93.7   MCH  29.6   MCHC  31.6*   RDW  47.8   PLATELETCT  331   MPV  9.9     Recent Labs      06/05/19   2136   SODIUM  139   POTASSIUM  4.2   CHLORIDE  103   CO2  29   GLUCOSE  88   BUN  16   CREATININE  2.24*   CALCIUM  9.4     Recent Labs      06/05/19   2136   ALTSGPT  15   ASTSGOT  16   ALKPHOSPHAT  80   TBILIRUBIN  0.4   GLUCOSE  88                 Recent Labs      06/05/19   2253   TROPONINI  <0.01       Urinalysis:    No results found     Imaging:  DX-CHEST-PORTABLE (1 VIEW)   Final Result      Possible mild left basilar pulmonary opacity which could represent atelectasis or infiltrate.      CT-HEAD W/O   Final Result       Normal noncontrast head CT.      US-RENAL    (Results Pending)         Assessment/Plan:  I anticipate this patient will require at least two midnights for appropriate medical management, necessitating inpatient admission.    * COLT (acute kidney injury) (HCC)   Assessment & Plan    Etiology not entirely clear could be secondary to poor intake  Blood pressures on the low side we will hold her home blood pressure medications  Was started on IV fluids for hydration  We will check renal ultrasound and urinalysis and sodium and protein     HTN (hypertension)- (present on admission)   Assessment & Plan    Her blood pressure is on the low side we will hold her amlodipine and ACE inhibitor and monitor     Intracerebral aneurysm- (present on admission)   Assessment & Plan    Reviewed importance of smoking cessation  She is scheduled to follow-up with neurosurgery for evaluation as an outpatient  CT head reviewed negative for acute intracranial pathology     Depression- (present on admission)   Assessment & Plan    Continue Lexapro     Smoker- (present on admission)   Assessment & Plan    Patient counseled on importance of smoking cessation  Total time spent counseling patient on cessation was 5 minutes     Chronic pain- (present on admission)   Assessment & Plan    We will hold her extended release morphine given her dizziness and lethargy  Continue with as needed oxycodone           VTE prophylaxis: Heparin

## 2019-06-06 NOTE — CARE PLAN
Problem: Knowledge Deficit  Goal: Knowledge of disease process/condition, treatment plan, diagnostic tests, and medications will improve  Outcome: PROGRESSING SLOWER THAN EXPECTED  Pt educated on high creatanine levels, pt tearful and unable to calm self down re: possibilities of reason. Continuing to offer emotional support and education. Await MD visit this AM.    Problem: Psychosocial Needs:  Goal: Level of anxiety will decrease  Outcome: PROGRESSING SLOWER THAN EXPECTED  Pt tearful, anxious regarding all possibilities of results of kidney u/s. Family at bedside, supportive; however pt still requiring lots of emotional support from staff.

## 2019-06-06 NOTE — CARE PLAN
Problem: Pain Management  Goal: Pain level will decrease to patient's comfort goal  Outcome: PROGRESSING AS EXPECTED      Problem: Safety  Goal: Will remain free from injury  Outcome: PROGRESSING AS EXPECTED  Safety Measures in Place

## 2019-06-06 NOTE — PROGRESS NOTES
RN paged and spoke to Dr Callahan for anti nausea meds and concern about right jaw swelling/tooth decay. Plan for zofran and CTM jaw, possible imaging if worse later or tomorrow.

## 2019-06-07 ENCOUNTER — APPOINTMENT (OUTPATIENT)
Dept: RADIOLOGY | Facility: MEDICAL CENTER | Age: 59
DRG: 683 | End: 2019-06-07
Attending: INTERNAL MEDICINE
Payer: COMMERCIAL

## 2019-06-07 LAB
ANION GAP SERPL CALC-SCNC: 4 MMOL/L (ref 0–11.9)
BASOPHILS # BLD AUTO: 0.7 % (ref 0–1.8)
BASOPHILS # BLD: 0.04 K/UL (ref 0–0.12)
BUN SERPL-MCNC: 9 MG/DL (ref 8–22)
CALCIUM SERPL-MCNC: 8.1 MG/DL (ref 8.5–10.5)
CHLORIDE SERPL-SCNC: 112 MMOL/L (ref 96–112)
CO2 SERPL-SCNC: 25 MMOL/L (ref 20–33)
CREAT SERPL-MCNC: 0.56 MG/DL (ref 0.5–1.4)
EOSINOPHIL # BLD AUTO: 0.1 K/UL (ref 0–0.51)
EOSINOPHIL NFR BLD: 1.8 % (ref 0–6.9)
ERYTHROCYTE [DISTWIDTH] IN BLOOD BY AUTOMATED COUNT: 47.4 FL (ref 35.9–50)
GLUCOSE SERPL-MCNC: 105 MG/DL (ref 65–99)
HCT VFR BLD AUTO: 34.4 % (ref 37–47)
HGB BLD-MCNC: 10.8 G/DL (ref 12–16)
IMM GRANULOCYTES # BLD AUTO: 0 K/UL (ref 0–0.11)
IMM GRANULOCYTES NFR BLD AUTO: 0 % (ref 0–0.9)
LYMPHOCYTES # BLD AUTO: 3.28 K/UL (ref 1–4.8)
LYMPHOCYTES NFR BLD: 59.2 % (ref 22–41)
MCH RBC QN AUTO: 29.8 PG (ref 27–33)
MCHC RBC AUTO-ENTMCNC: 31.4 G/DL (ref 33.6–35)
MCV RBC AUTO: 94.8 FL (ref 81.4–97.8)
MONOCYTES # BLD AUTO: 0.37 K/UL (ref 0–0.85)
MONOCYTES NFR BLD AUTO: 6.7 % (ref 0–13.4)
NEUTROPHILS # BLD AUTO: 1.75 K/UL (ref 2–7.15)
NEUTROPHILS NFR BLD: 31.6 % (ref 44–72)
NRBC # BLD AUTO: 0 K/UL
NRBC BLD-RTO: 0 /100 WBC
PLATELET # BLD AUTO: 237 K/UL (ref 164–446)
PMV BLD AUTO: 9.8 FL (ref 9–12.9)
POTASSIUM SERPL-SCNC: 4.3 MMOL/L (ref 3.6–5.5)
RBC # BLD AUTO: 3.63 M/UL (ref 4.2–5.4)
SODIUM SERPL-SCNC: 141 MMOL/L (ref 135–145)
WBC # BLD AUTO: 5.5 K/UL (ref 4.8–10.8)

## 2019-06-07 PROCEDURE — 700111 HCHG RX REV CODE 636 W/ 250 OVERRIDE (IP): Performed by: HOSPITALIST

## 2019-06-07 PROCEDURE — 700102 HCHG RX REV CODE 250 W/ 637 OVERRIDE(OP): Performed by: INTERNAL MEDICINE

## 2019-06-07 PROCEDURE — A9270 NON-COVERED ITEM OR SERVICE: HCPCS | Performed by: HOSPITALIST

## 2019-06-07 PROCEDURE — 36415 COLL VENOUS BLD VENIPUNCTURE: CPT

## 2019-06-07 PROCEDURE — 700102 HCHG RX REV CODE 250 W/ 637 OVERRIDE(OP): Performed by: HOSPITALIST

## 2019-06-07 PROCEDURE — 700111 HCHG RX REV CODE 636 W/ 250 OVERRIDE (IP): Performed by: INTERNAL MEDICINE

## 2019-06-07 PROCEDURE — 700105 HCHG RX REV CODE 258: Performed by: INTERNAL MEDICINE

## 2019-06-07 PROCEDURE — 700101 HCHG RX REV CODE 250: Performed by: INTERNAL MEDICINE

## 2019-06-07 PROCEDURE — 770020 HCHG ROOM/CARE - TELE (206)

## 2019-06-07 PROCEDURE — 80048 BASIC METABOLIC PNL TOTAL CA: CPT

## 2019-06-07 PROCEDURE — 99233 SBSQ HOSP IP/OBS HIGH 50: CPT | Performed by: INTERNAL MEDICINE

## 2019-06-07 PROCEDURE — A9270 NON-COVERED ITEM OR SERVICE: HCPCS | Performed by: INTERNAL MEDICINE

## 2019-06-07 PROCEDURE — 85025 COMPLETE CBC W/AUTO DIFF WBC: CPT

## 2019-06-07 RX ORDER — LORAZEPAM 2 MG/ML
0.5 INJECTION INTRAMUSCULAR ONCE
Status: COMPLETED | OUTPATIENT
Start: 2019-06-07 | End: 2019-06-07

## 2019-06-07 RX ORDER — LORAZEPAM 2 MG/ML
0.5 INJECTION INTRAMUSCULAR ONCE
Status: COMPLETED | OUTPATIENT
Start: 2019-06-07 | End: 2019-06-08

## 2019-06-07 RX ORDER — AMLODIPINE BESYLATE 5 MG/1
5 TABLET ORAL
Status: DISCONTINUED | OUTPATIENT
Start: 2019-06-07 | End: 2019-06-08 | Stop reason: HOSPADM

## 2019-06-07 RX ADMIN — CEFTRIAXONE SODIUM 1 G: 1 INJECTION, POWDER, FOR SOLUTION INTRAMUSCULAR; INTRAVENOUS at 05:50

## 2019-06-07 RX ADMIN — POTASSIUM CHLORIDE AND SODIUM CHLORIDE: 900; 150 INJECTION, SOLUTION INTRAVENOUS at 16:30

## 2019-06-07 RX ADMIN — GABAPENTIN 200 MG: 100 CAPSULE ORAL at 17:53

## 2019-06-07 RX ADMIN — OXYCODONE HYDROCHLORIDE AND ACETAMINOPHEN 1 TABLET: 5; 325 TABLET ORAL at 11:04

## 2019-06-07 RX ADMIN — HEPARIN SODIUM 5000 UNITS: 5000 INJECTION, SOLUTION INTRAVENOUS; SUBCUTANEOUS at 05:50

## 2019-06-07 RX ADMIN — AMLODIPINE BESYLATE 5 MG: 5 TABLET ORAL at 11:04

## 2019-06-07 RX ADMIN — POTASSIUM CHLORIDE AND SODIUM CHLORIDE: 900; 150 INJECTION, SOLUTION INTRAVENOUS at 05:51

## 2019-06-07 RX ADMIN — SENNOSIDES,DOCUSATE SODIUM 2 TABLET: 8.6; 5 TABLET, FILM COATED ORAL at 05:51

## 2019-06-07 RX ADMIN — OXYCODONE HYDROCHLORIDE AND ACETAMINOPHEN 1 TABLET: 5; 325 TABLET ORAL at 21:19

## 2019-06-07 RX ADMIN — OXYCODONE HYDROCHLORIDE AND ACETAMINOPHEN 1 TABLET: 5; 325 TABLET ORAL at 05:50

## 2019-06-07 RX ADMIN — OXYCODONE HYDROCHLORIDE AND ACETAMINOPHEN 1 TABLET: 5; 325 TABLET ORAL at 01:08

## 2019-06-07 RX ADMIN — ESCITALOPRAM OXALATE 10 MG: 10 TABLET ORAL at 05:50

## 2019-06-07 RX ADMIN — SIMVASTATIN 20 MG: 20 TABLET, FILM COATED ORAL at 17:53

## 2019-06-07 RX ADMIN — OXYCODONE HYDROCHLORIDE AND ACETAMINOPHEN 1 TABLET: 5; 325 TABLET ORAL at 16:50

## 2019-06-07 RX ADMIN — GABAPENTIN 200 MG: 100 CAPSULE ORAL at 05:50

## 2019-06-07 ASSESSMENT — ENCOUNTER SYMPTOMS
HEADACHES: 0
BLOOD IN STOOL: 0
SHORTNESS OF BREATH: 0
DIZZINESS: 0
BLURRED VISION: 0
FEVER: 0
DIARRHEA: 0
ABDOMINAL PAIN: 0
MYALGIAS: 0
CONSTIPATION: 0
VOMITING: 0

## 2019-06-07 NOTE — PROGRESS NOTES
Patient admitted with headache of 9 month duration.  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  I reviewed MRA from [facility] from 4/2019; I do not appreciate any aneurysm.  Recommend CTA COW to evaluate for possible aneurysm and MRI to evaluate for hemorrhage.  If blood on MRI then recommend Neurology to evaluate headaches.

## 2019-06-07 NOTE — PROGRESS NOTES
Received report from Sarah at Pts bedside. Pts white board updated and safety measures in place. No questions or needs at this time.

## 2019-06-07 NOTE — CARE PLAN
Problem: Safety  Goal: Will remain free from falls  Outcome: PROGRESSING AS EXPECTED  Safety measures in place    Problem: Psychosocial Needs:  Goal: Level of anxiety will decrease  Outcome: PROGRESSING AS EXPECTED   06/06/19 2100   OTHER   Patient Behaviors Anxious;Crying;Tearful   Will report decreased anxiety

## 2019-06-07 NOTE — PROGRESS NOTES
[facility] MEDICINE PROGRESS NOTE    Date of service  06/06/19    Chief Complaint  Altered level consciousness    Interval History:  59 y.o. year old female here with altered level consciousness and acute kidney injury.  Today much better, more alert and oriented.  Patient seen and examined with her daughter at bedside.    Consultants/Specialty  None    Review of Systems    Review of Systems   Constitutional: Negative for fever.   HENT: Negative for ear pain.    Eyes: Negative for blurred vision.   Respiratory: Negative for shortness of breath.    Cardiovascular: Negative for chest pain.   Gastrointestinal: Negative for abdominal pain, blood in stool, constipation, diarrhea, melena and vomiting.   Genitourinary: Negative for dysuria, hematuria and urgency.   Musculoskeletal: Negative for myalgias.   Skin: Negative for rash.   Neurological: Negative for dizziness and headaches.   All other systems reviewed and are negative.        Physical Exam   Vitals:    06/07/19 0513 06/07/19 0757 06/07/19 1100 06/07/19 1244   BP: 121/78 125/83 110/77 119/80   Pulse: 68 67  71   Resp: 17 16  16   Temp: 36.1 °C (96.9 °F) 36.6 °C (97.9 °F)  36.4 °C (97.6 °F)   TempSrc: Temporal Temporal  Temporal   SpO2: 91% 92%  97%   Weight:       Height:           Examination was done and no changes were present from yesterday, 6/6/2019, note except for:  Physical Exam   Constitutional: She is oriented to person, place, and time and well-developed, well-nourished, and in no distress. No distress.   HENT:   Head: Normocephalic and atraumatic.   Eyes: Pupils are equal, round, and reactive to light. Conjunctivae are normal. No scleral icterus.   Neck: Normal range of motion. Neck supple.   Cardiovascular: Normal rate, regular rhythm and intact distal pulses.  Exam reveals no gallop and no friction rub.    No murmur heard.  Pulmonary/Chest: Effort normal and breath sounds normal. No respiratory distress. She has no wheezes. She has no rales. She  exhibits no tenderness.   Abdominal: Soft. Bowel sounds are normal. She exhibits no distension and no mass. There is no tenderness. There is no rebound and no guarding.   Musculoskeletal: Normal range of motion. She exhibits no edema or tenderness.   Neurological: She is alert and oriented to person, place, and time.   Skin: Skin is warm and dry. She is not diaphoretic.   Psychiatric: Mood and affect normal.   Vitals reviewed.       Laboratory  Hematology   Lab Results   Component Value Date/Time    WBC 5.5 06/07/2019 01:00 AM    HEMOGLOBIN 10.8 (L) 06/07/2019 01:00 AM    HEMATOCRIT 34.4 (L) 06/07/2019 01:00 AM    PLATELETCT 237 06/07/2019 01:00 AM         Laboratory data not explicitly included in this progress note were reviewed by me.     Chemistry   Lab Results   Component Value Date/Time    SODIUM 141 06/07/2019 01:00 AM    POTASSIUM 4.3 06/07/2019 01:00 AM    CHLORIDE 112 06/07/2019 01:00 AM    CO2 25 06/07/2019 01:00 AM    GLUCOSE 105 (H) 06/07/2019 01:00 AM    BUN 9 06/07/2019 01:00 AM    CREATININE 0.56 06/07/2019 01:00 AM    CREATININE 0.7 02/04/2007 03:50 AM         Radiology  US-RENAL  Narrative: 6/6/2019 4:15 AM    HISTORY/REASON FOR EXAM:  Abnormal Labs    TECHNIQUE/EXAM DESCRIPTION:  Renal ultrasound.    COMPARISON:  Correlation to CT exam 12/5/2015    FINDINGS:  The right kidney measures 10.40 cm.  The left kidney measures 10.70 cm.    There is no hydronephrosis.  There are no abnormal calcifications.    The bladder demonstrates no focal wall abnormality.  The left ureteral jet is seen. The right jet is not visualized  Impression: Negative renal ultrasound.  DX-CHEST-PORTABLE (1 VIEW)  Narrative: 6/5/2019 11:08 PM    HISTORY/REASON FOR EXAM:  Cough.  Imbalance, cough, headache    TECHNIQUE/EXAM DESCRIPTION AND NUMBER OF VIEWS:  Single portable view of the chest.    COMPARISON: 10/19/2018    FINDINGS:    Cardiac silhouette is borderline-enlarged. Mild nonspecific elevation of the right  hemidiaphragm.    Possible retrocardiac left lower lobe opacity which could represent atelectasis, infiltrate or edema. No significant pleural effusion or pneumothorax.    The spinal fusion hardware is seen.  Impression: Possible mild left basilar pulmonary opacity which could represent atelectasis or infiltrate.    Imaging results not explicitly included in this progress note were reviewed by me.     Assessment/Plan    ALOC  Possible UTI   COLT (acute kidney injury) (HCC) (6/6/2019)   HTN (hypertension) (6/13/2015)   Chronic pain (6/11/2012)   Smoker (6/13/2015)   Depression (6/9/2012)   Intracerebral aneurysm (5/13/2019)  Jaw swelling    Her mentation is much better today.  She remembers that she took her narcotics, benzodiazepine, but she remember taking her Neurontin twice the amount of her prescription.  More than likely, polypharmacy in the setting of acute kidney injury is the cause of her altered level of consciousness.    Keep the CFTX IV for now and await urine culture and see if her mentation clear up.    I will continue to hold her long-acting narcotics, decrease her Neurontin to 200 mg p.o. twice daily, avoid other benzodiazepine, and allow for her to metabolize to freedom.    Her jaw swelling could potentially be from a dental problem.  Will monitor and order imaging if worsened.    With regard to her history of an intracranial cerebral aneurysm, I am unable to locate it on the head CT.  Patient have requested to speak to her neurosurgeon, Dr. Sorto, because she was supposed to see him in clinic today.  I have discussed the request with Dr. Sorto.  Additionally, I order an MRI brain without contrast to evaluate for any progression.    Restart her amlodipine for her hypertension.  Continue to hold lisinopril.      DVT prophylaxis: She is ambulating today.  Discontinue heparin subcu.    CODE STATUS:  Full Code    Disposition: Probably will be home tomorrow if clinically stable.

## 2019-06-08 ENCOUNTER — APPOINTMENT (OUTPATIENT)
Dept: RADIOLOGY | Facility: MEDICAL CENTER | Age: 59
DRG: 683 | End: 2019-06-08
Attending: INTERNAL MEDICINE
Payer: COMMERCIAL

## 2019-06-08 ENCOUNTER — PATIENT OUTREACH (OUTPATIENT)
Dept: HEALTH INFORMATION MANAGEMENT | Facility: OTHER | Age: 59
End: 2019-06-08

## 2019-06-08 VITALS
BODY MASS INDEX: 24.58 KG/M2 | HEIGHT: 64 IN | TEMPERATURE: 96.8 F | HEART RATE: 70 BPM | SYSTOLIC BLOOD PRESSURE: 150 MMHG | OXYGEN SATURATION: 98 % | WEIGHT: 143.96 LBS | DIASTOLIC BLOOD PRESSURE: 82 MMHG | RESPIRATION RATE: 16 BRPM

## 2019-06-08 PROBLEM — I67.1 INTRACEREBRAL ANEURYSM: Status: RESOLVED | Noted: 2019-05-13 | Resolved: 2019-06-08

## 2019-06-08 PROBLEM — N17.9 AKI (ACUTE KIDNEY INJURY) (HCC): Status: RESOLVED | Noted: 2019-06-06 | Resolved: 2019-06-08

## 2019-06-08 LAB
BACTERIA UR CULT: NORMAL
SIGNIFICANT IND 70042: NORMAL
SITE SITE: NORMAL
SOURCE SOURCE: NORMAL

## 2019-06-08 PROCEDURE — 700102 HCHG RX REV CODE 250 W/ 637 OVERRIDE(OP): Performed by: INTERNAL MEDICINE

## 2019-06-08 PROCEDURE — 700101 HCHG RX REV CODE 250: Performed by: INTERNAL MEDICINE

## 2019-06-08 PROCEDURE — A9270 NON-COVERED ITEM OR SERVICE: HCPCS | Performed by: INTERNAL MEDICINE

## 2019-06-08 PROCEDURE — A9270 NON-COVERED ITEM OR SERVICE: HCPCS | Performed by: HOSPITALIST

## 2019-06-08 PROCEDURE — 700102 HCHG RX REV CODE 250 W/ 637 OVERRIDE(OP): Performed by: HOSPITALIST

## 2019-06-08 PROCEDURE — 700105 HCHG RX REV CODE 258: Performed by: INTERNAL MEDICINE

## 2019-06-08 PROCEDURE — 99239 HOSP IP/OBS DSCHRG MGMT >30: CPT | Performed by: INTERNAL MEDICINE

## 2019-06-08 PROCEDURE — 70551 MRI BRAIN STEM W/O DYE: CPT

## 2019-06-08 PROCEDURE — 700111 HCHG RX REV CODE 636 W/ 250 OVERRIDE (IP): Performed by: INTERNAL MEDICINE

## 2019-06-08 RX ORDER — GABAPENTIN 800 MG/1
400 TABLET ORAL 3 TIMES DAILY
Qty: 45 TAB | Refills: 0 | Status: SHIPPED | OUTPATIENT
Start: 2019-06-08 | End: 2019-06-11

## 2019-06-08 RX ADMIN — CEFTRIAXONE SODIUM 1 G: 1 INJECTION, POWDER, FOR SOLUTION INTRAMUSCULAR; INTRAVENOUS at 05:57

## 2019-06-08 RX ADMIN — POTASSIUM CHLORIDE AND SODIUM CHLORIDE: 900; 150 INJECTION, SOLUTION INTRAVENOUS at 04:49

## 2019-06-08 RX ADMIN — LORAZEPAM 0.5 MG: 2 INJECTION INTRAMUSCULAR; INTRAVENOUS at 09:36

## 2019-06-08 RX ADMIN — ESCITALOPRAM OXALATE 10 MG: 10 TABLET ORAL at 05:58

## 2019-06-08 RX ADMIN — OXYCODONE HYDROCHLORIDE AND ACETAMINOPHEN 1 TABLET: 5; 325 TABLET ORAL at 01:38

## 2019-06-08 RX ADMIN — OXYCODONE HYDROCHLORIDE AND ACETAMINOPHEN 1 TABLET: 5; 325 TABLET ORAL at 06:03

## 2019-06-08 RX ADMIN — GABAPENTIN 200 MG: 100 CAPSULE ORAL at 05:58

## 2019-06-08 RX ADMIN — AMLODIPINE BESYLATE 5 MG: 5 TABLET ORAL at 05:58

## 2019-06-08 RX ADMIN — SENNOSIDES,DOCUSATE SODIUM 2 TABLET: 8.6; 5 TABLET, FILM COATED ORAL at 05:58

## 2019-06-08 RX ADMIN — OXYCODONE HYDROCHLORIDE AND ACETAMINOPHEN 1 TABLET: 5; 325 TABLET ORAL at 12:49

## 2019-06-08 NOTE — PROGRESS NOTES
"Bedside report completed. Patient neurologically intact, ambulating in room independently with steady gait. Patient tearful initially regarding home meds that she was taking, and stated \"everyone is mad at me, I didn't know I couldn't take my own tylenol.\" Emotional support provided. Plan of care discussed, plan for MRI this morning. Will continue to monitor  "

## 2019-06-08 NOTE — PROGRESS NOTES
Discharge instructions reviewed, IV removed, all questions and concerns addressed. Patient wheeled out via wheelchair with nurse and family.    Patient had all belongings including: purse, dentures, ring, necklace, table and phone

## 2019-06-08 NOTE — CARE PLAN
Problem: Safety  Goal: Will remain free from injury  Safety precautions in place    Problem: Discharge Barriers/Planning  Goal: Patient's continuum of care needs will be met    Intervention: Assess potential discharge barriers on admission and throughout hospital stay  Plan of care discussed, MRI planned for this morning

## 2019-06-08 NOTE — PROGRESS NOTES
Over the counter tylenol packet found in pt bed when pt stood for morning weight. Dr. Escalante notified. Pt admitted to taking 650mg of tylenol overnight for treatment of tooth ache. Pt reeducated regarding hospital policies and risks involved in combining prn medications.

## 2019-06-08 NOTE — PROGRESS NOTES
Bedside report received from DAREN Almendarez. POC discussed with pt; all questions answered at this time. White board updated. Appropriate functioning of tele monitor confirmed. All needs met at this time.

## 2019-06-08 NOTE — PROGRESS NOTES
Overnight Hospitalist:    -Received notification from nurse that patient took 650 mg Tylenol from home (individual package and nurse found and confirmed those on her bed).  She has been taking Percocet.  -Calculated total amount of Percocet given over the last 24 hours and she received 1625 mg total Tylenol.  -Per report, less than 4 g Tylenol in 24 hours.   -Please monitor during the day

## 2019-06-08 NOTE — CARE PLAN
Problem: Infection  Goal: Will remain free from infection  Outcome: PROGRESSING AS EXPECTED  Continues on IV rocephin until UC final     Problem: Psychosocial Needs:  Goal: Level of anxiety will decrease  Outcome: PROGRESSING SLOWER THAN EXPECTED  Pt can get anxious easily which makes it more difficult for her to retain information on plan of care

## 2019-06-08 NOTE — PROGRESS NOTES
Patient pre-medicated for MRI with ativan per order, taken to MRI with MRI RN via wheelchair. Refused to take dentures out, will take them out down there she says

## 2019-06-08 NOTE — DISCHARGE SUMMARY
Discharge Summary    CHIEF COMPLAINT ON ADMISSION  Chief Complaint   Patient presents with   • Headache     Pt reports she has a hx of brain aneurysm. Pt was scheduled to get a MRI tomorrow of her brain. Today at noon patient suddenly got the worse HA of her life and has since been off balance   • Loss Of Balance       Reason for Admission  Confusion     Admission Date  6/5/2019    CODE STATUS  Full Code    HPI & HOSPITAL COURSE  This is a 59 y.o. female here with a history of chronic pain and depression who was presented to the emergency department via ambulance because she was completely altered after her neighbor found her wandering.  On presentation, the patient was found to be in acute kidney injury with a creatinine of 2.24, overlying a history of normal renal function.  Upon interviewing her daughters, apparently, she has recently been prescribed additional pain medication.  However, none of her family member knows exactly what happened.  They were concerned because she was told by 1 of her physicians that she has a brain aneurysm that was approximately 2 mm that was seen on the brain MRI.    The patient was admitted to the hospital and provide with supportive care, IV fluid, and holding all psychotropic medications.  She was not in any respiratory distress, but was hypotensive.  There was also concern for a urinary tract infection, which was felt to be a contributing factor to her out of level consciousness.  She was treated for her bladder infection with ceftriaxone.    A brain MRI was obtained and no brain aneurysm was visualized.  Head CT on initial presentation could not see any aneurysm as well.  For this condition, the patient was advised to follow-up with her clinicians to discuss the results of the head CT and brain MRI here.    Once the patient was more alert and oriented, she detail that on the day of her admission that morning, she took what appeared to be twice the amount of Neurontin that she  were prescribed in addition to her long-acting morphine, oxycodone, and other medication that has sedated potential.    With supportive care, the patient kidney function fully recovered.  She was allow rest and does metabolize all substance completely.  On hospital day #3, the patient has completely recovered and has returned to her normal baseline.  Her outpatient medications were adjusted accordingly.      She also complained of a tooth ache.  Examination revealed no obvious abscess.  She has a broken tooth on the right lower mandible.  Her dentist is aware of this and she was advised to return to her dentist in follow-up for tooth extraction.    Therefore, she is discharged in good and stable condition to home with close outpatient follow-up.    The patient met 2-midnight criteria for an inpatient stay at the time of discharge.    Discharge Date  06/08/2019    FOLLOW UP ITEMS POST DISCHARGE  None    DISCHARGE DIAGNOSES  Principal Problem (Resolved):    COLT (acute kidney injury) (HCC) POA: Unknown  Active Problems:    HTN (hypertension) POA: Yes      Overview: Takes lisinopril and vasotec outpatient       - Resume lisinopril.     Chronic pain POA: Yes    Smoker POA: Yes      Overview: :    Depression POA: Yes  Resolved Problems:    Intracerebral aneurysm POA: Yes      FOLLOW UP  Future Appointments  Date Time Provider Department Center   6/13/2019 1:00 PM [address]   7/8/2019 11:00 AM [address], M.D.  [address]  [facility] [id number]  [phone]    In 1 week  Post hospital discharge, resolved kidney injury, pain      MEDICATIONS ON DISCHARGE     Medication List      CHANGE how you take these medications      Instructions   gabapentin 800 MG tablet  What changed:  how much to take  Commonly known as:  NEURONTIN   Take 0.5 Tabs by mouth 3 times a day for 30 days.  Dose:  400 mg        CONTINUE taking these medications      Instructions    albuterol 108 (90 Base) MCG/ACT Aers inhalation aerosol   Inhale 2 Puffs by mouth every 6 hours as needed for Shortness of Breath.  Dose:  2 Puff     amLODIPine 5 MG Tabs  Commonly known as:  NORVASC   Take 1 Tab by mouth every day.  Dose:  5 mg     enalapril 20 MG tablet  Commonly known as:  VASOTEC   Take 1 Tab by mouth every day.  Dose:  20 mg     escitalopram 10 MG Tabs  Commonly known as:  LEXAPRO   TK 1 T PO QD     morphine ER 15 MG Tbcr tablet  Commonly known as:  MS CONTIN   Take 15 mg by mouth every day.  Dose:  15 mg     oxyCODONE-acetaminophen 5-325 MG Tabs  Commonly known as:  PERCOCET   Take 1-2 Tabs by mouth every four hours as needed.  Dose:  1-2 Tab     potassium chloride SA 20 MEQ Tbcr  Commonly known as:  Kdur   Doctor's comments:  Patient needs follow-up appointment for more refills. Only 30 day supply given.  Take 1 Tab by mouth every day.  Dose:  20 mEq     simvastatin 20 MG Tabs  Commonly known as:  ZOCOR   Doctor's comments:  Patient needs follow-up appointment for more refills.  Take 1 Tab by mouth every evening. N THE EVENING  Dose:  20 mg        STOP taking these medications    SUMAtriptan 50 MG Tabs  Commonly known as:  IMITREX     tizanidine 4 MG Tabs  Commonly known as:  ZANAFLEX            Allergies  No Known Allergies    DIET  Orders Placed This Encounter   Procedures   • Diet Order Regular     Standing Status:   Standing     Number of Occurrences:   1     Order Specific Question:   Diet:     Answer:   Regular [1]       ACTIVITY  As tolerated.  Weight bearing as tolerated    CONSULTATIONS  None    PROCEDURES  None    LABORATORY  Lab Results   Component Value Date    SODIUM 141 06/07/2019    POTASSIUM 4.3 06/07/2019    CHLORIDE 112 06/07/2019    CO2 25 06/07/2019    GLUCOSE 105 (H) 06/07/2019    BUN 9 06/07/2019    CREATININE 0.56 06/07/2019    CREATININE 0.7 02/04/2007        Lab Results   Component Value Date    WBC 5.5 06/07/2019    HEMOGLOBIN 10.8 (L) 06/07/2019    HEMATOCRIT 34.4  (L) 06/07/2019    PLATELETCT 237 06/07/2019        Total time of the discharge process exceeds 34 minutes.

## 2019-06-08 NOTE — CARE PLAN
Problem: Pain Management  Goal: Pain level will decrease to patient's comfort goal  Pt assessed for pain regularly and medicated PRN per MAR.     Problem: Communication  Goal: The ability to communicate needs accurately and effectively will improve  White board updated with POC and care team information during bedside report.    Problem: Safety  Goal: Will remain free from falls  Fall precautions in place. Bed in lowest position. Non-skid socks in place. Personal possessions within reach. Mobility sign on door. Bed-alarm on. Call light within reach. Pt educated regarding fall prevention and states understanding.

## 2019-06-08 NOTE — DISCHARGE INSTRUCTIONS
Your brain MRI is negative for aneurysm here.  You should discuss the result with your doctors.  You can optain the images from medical records department and show your doctors.      You should return to see your Dentist about your tooth ache.      Keep yourself well hydrated.      Discharge Instructions    Discharged to home by car with relative. Discharged via wheelchair, hospital escort: Yes.  Special equipment needed: Not Applicable    Be sure to schedule a follow-up appointment with your primary care doctor or any specialists as instructed.     Discharge Plan:   Diet Plan: Discussed  Activity Level: Discussed  Smoking Cessation Offered: Patient Refused  Confirmed Follow up Appointment: Patient to Call and Schedule Appointment  Confirmed Symptoms Management: Discussed  Medication Reconciliation Updated: Yes  Influenza Vaccine Indication: Indicated: 9 to 64 years of age    I understand that a diet low in cholesterol, fat, and sodium is recommended for good health. Unless I have been given specific instructions below for another diet, I accept this instruction as my diet prescription.   Other diet: Regular    Special Instructions: None    · Is patient discharged on Warfarin / Coumadin?   No     Depression / Suicide Risk    As you are discharged from this [facility] facility, it is important to learn how to keep safe from harming yourself.    Recognize the warning signs:  · Abrupt changes in personality, positive or negative- including increase in energy   · Giving away possessions  · Change in eating patterns- significant weight changes-  positive or negative  · Change in sleeping patterns- unable to sleep or sleeping all the time   · Unwillingness or inability to communicate  · Depression  · Unusual sadness, discouragement and loneliness  · Talk of wanting to die  · Neglect of personal appearance   · Rebelliousness- reckless behavior  · Withdrawal from people/activities they love  · Confusion- inability to  concentrate     If you or a loved one observes any of these behaviors or has concerns about self-harm, here's what you can do:  · Talk about it- your feelings and reasons for harming yourself  · Remove any means that you might use to hurt yourself (examples: pills, rope, extension cords, firearm)  · Get professional help from the community (Mental Health, Substance Abuse, psychological counseling)  · Do not be alone:Call your Safe Contact- someone whom you trust who will be there for you.  · Call your local CRISIS HOTLINE 149-7214 or 988-369-7728  · Call your local Children's Mobile Crisis Response Team [place] (947) 677-7281 or www.[employer]  · Call the toll free National Suicide Prevention Hotlines   · National Suicide Prevention Lifeline 879-059-ZAAZ (3791)  · National Hope Line Network 800-SUICIDE (877-4332)

## 2019-06-11 ENCOUNTER — HOSPITAL ENCOUNTER (INPATIENT)
Facility: MEDICAL CENTER | Age: 59
LOS: 1 days | DRG: 093 | End: 2019-06-12
Attending: EMERGENCY MEDICINE | Admitting: HOSPITALIST
Payer: COMMERCIAL

## 2019-06-11 ENCOUNTER — APPOINTMENT (OUTPATIENT)
Dept: RADIOLOGY | Facility: MEDICAL CENTER | Age: 59
DRG: 093 | End: 2019-06-11
Attending: EMERGENCY MEDICINE
Payer: COMMERCIAL

## 2019-06-11 DIAGNOSIS — R40.0 SOMNOLENCE: ICD-10-CM

## 2019-06-11 DIAGNOSIS — I95.9 HYPOTENSION, UNSPECIFIED HYPOTENSION TYPE: ICD-10-CM

## 2019-06-11 PROBLEM — D72.819 LEUKOPENIA: Status: ACTIVE | Noted: 2019-06-11

## 2019-06-11 PROBLEM — G93.40 ENCEPHALOPATHY: Status: ACTIVE | Noted: 2019-06-11

## 2019-06-11 LAB
ALBUMIN SERPL BCP-MCNC: 4 G/DL (ref 3.2–4.9)
ALBUMIN/GLOB SERPL: 1.4 G/DL
ALP SERPL-CCNC: 85 U/L (ref 30–99)
ALT SERPL-CCNC: 24 U/L (ref 2–50)
AMPHET UR QL SCN: NEGATIVE
ANION GAP SERPL CALC-SCNC: 7 MMOL/L (ref 0–11.9)
APPEARANCE UR: CLEAR
AST SERPL-CCNC: 21 U/L (ref 12–45)
BACTERIA #/AREA URNS HPF: NEGATIVE /HPF
BACTERIA BLD CULT: NORMAL
BACTERIA BLD CULT: NORMAL
BARBITURATES UR QL SCN: NEGATIVE
BASOPHILS # BLD AUTO: 1.3 % (ref 0–1.8)
BASOPHILS # BLD: 0.06 K/UL (ref 0–0.12)
BENZODIAZ UR QL SCN: POSITIVE
BILIRUB SERPL-MCNC: 0.4 MG/DL (ref 0.1–1.5)
BILIRUB UR QL STRIP.AUTO: NEGATIVE
BUN SERPL-MCNC: 13 MG/DL (ref 8–22)
BZE UR QL SCN: NEGATIVE
CALCIUM SERPL-MCNC: 9.5 MG/DL (ref 8.5–10.5)
CANNABINOIDS UR QL SCN: NEGATIVE
CHLORIDE SERPL-SCNC: 106 MMOL/L (ref 96–112)
CO2 SERPL-SCNC: 28 MMOL/L (ref 20–33)
COLOR UR: YELLOW
CREAT SERPL-MCNC: 1.15 MG/DL (ref 0.5–1.4)
CRP SERPL HS-MCNC: 0.37 MG/DL (ref 0–0.75)
EKG IMPRESSION: NORMAL
EOSINOPHIL # BLD AUTO: 0.13 K/UL (ref 0–0.51)
EOSINOPHIL NFR BLD: 2.8 % (ref 0–6.9)
EPI CELLS #/AREA URNS HPF: ABNORMAL /HPF
ERYTHROCYTE [DISTWIDTH] IN BLOOD BY AUTOMATED COUNT: 46.6 FL (ref 35.9–50)
ERYTHROCYTE [SEDIMENTATION RATE] IN BLOOD BY WESTERGREN METHOD: 26 MM/HOUR (ref 0–30)
ETHANOL BLD-MCNC: 0 G/DL
GLOBULIN SER CALC-MCNC: 2.9 G/DL (ref 1.9–3.5)
GLUCOSE SERPL-MCNC: 69 MG/DL (ref 65–99)
GLUCOSE UR STRIP.AUTO-MCNC: NEGATIVE MG/DL
HCT VFR BLD AUTO: 38.2 % (ref 37–47)
HGB BLD-MCNC: 12.2 G/DL (ref 12–16)
HYALINE CASTS #/AREA URNS LPF: ABNORMAL /LPF
IMM GRANULOCYTES # BLD AUTO: 0.02 K/UL (ref 0–0.11)
IMM GRANULOCYTES NFR BLD AUTO: 0.4 % (ref 0–0.9)
KETONES UR STRIP.AUTO-MCNC: NEGATIVE MG/DL
LACTATE BLD-SCNC: 1.3 MMOL/L (ref 0.5–2)
LEUKOCYTE ESTERASE UR QL STRIP.AUTO: ABNORMAL
LYMPHOCYTES # BLD AUTO: 2.48 K/UL (ref 1–4.8)
LYMPHOCYTES NFR BLD: 53.7 % (ref 22–41)
MCH RBC QN AUTO: 29.5 PG (ref 27–33)
MCHC RBC AUTO-ENTMCNC: 31.9 G/DL (ref 33.6–35)
MCV RBC AUTO: 92.5 FL (ref 81.4–97.8)
METHADONE UR QL SCN: NEGATIVE
MICRO URNS: ABNORMAL
MONOCYTES # BLD AUTO: 0.38 K/UL (ref 0–0.85)
MONOCYTES NFR BLD AUTO: 8.2 % (ref 0–13.4)
NEUTROPHILS # BLD AUTO: 1.55 K/UL (ref 2–7.15)
NEUTROPHILS NFR BLD: 33.6 % (ref 44–72)
NITRITE UR QL STRIP.AUTO: NEGATIVE
NRBC # BLD AUTO: 0 K/UL
NRBC BLD-RTO: 0 /100 WBC
OPIATES UR QL SCN: POSITIVE
OXYCODONE UR QL SCN: POSITIVE
PCP UR QL SCN: NEGATIVE
PH UR STRIP.AUTO: 7 [PH]
PLATELET # BLD AUTO: 337 K/UL (ref 164–446)
PMV BLD AUTO: 9.7 FL (ref 9–12.9)
POTASSIUM SERPL-SCNC: 4.4 MMOL/L (ref 3.6–5.5)
PROCALCITONIN SERPL-MCNC: <0.05 NG/ML
PROPOXYPH UR QL SCN: NEGATIVE
PROT SERPL-MCNC: 6.9 G/DL (ref 6–8.2)
PROT UR QL STRIP: NEGATIVE MG/DL
RBC # BLD AUTO: 4.13 M/UL (ref 4.2–5.4)
RBC # URNS HPF: ABNORMAL /HPF
RBC UR QL AUTO: ABNORMAL
SIGNIFICANT IND 70042: NORMAL
SIGNIFICANT IND 70042: NORMAL
SITE SITE: NORMAL
SITE SITE: NORMAL
SODIUM SERPL-SCNC: 141 MMOL/L (ref 135–145)
SOURCE SOURCE: NORMAL
SOURCE SOURCE: NORMAL
SP GR UR STRIP.AUTO: 1.01
TROPONIN I SERPL-MCNC: <0.01 NG/ML (ref 0–0.04)
UROBILINOGEN UR STRIP.AUTO-MCNC: 0.2 MG/DL
WBC # BLD AUTO: 4.6 K/UL (ref 4.8–10.8)
WBC #/AREA URNS HPF: ABNORMAL /HPF

## 2019-06-11 PROCEDURE — 99285 EMERGENCY DEPT VISIT HI MDM: CPT

## 2019-06-11 PROCEDURE — A9270 NON-COVERED ITEM OR SERVICE: HCPCS | Performed by: HOSPITALIST

## 2019-06-11 PROCEDURE — 87040 BLOOD CULTURE FOR BACTERIA: CPT

## 2019-06-11 PROCEDURE — 770020 HCHG ROOM/CARE - TELE (206)

## 2019-06-11 PROCEDURE — A9270 NON-COVERED ITEM OR SERVICE: HCPCS | Performed by: INTERNAL MEDICINE

## 2019-06-11 PROCEDURE — 85652 RBC SED RATE AUTOMATED: CPT

## 2019-06-11 PROCEDURE — 85025 COMPLETE CBC W/AUTO DIFF WBC: CPT

## 2019-06-11 PROCEDURE — 700105 HCHG RX REV CODE 258: Performed by: HOSPITALIST

## 2019-06-11 PROCEDURE — 80053 COMPREHEN METABOLIC PANEL: CPT

## 2019-06-11 PROCEDURE — 93005 ELECTROCARDIOGRAM TRACING: CPT | Performed by: EMERGENCY MEDICINE

## 2019-06-11 PROCEDURE — 84145 PROCALCITONIN (PCT): CPT

## 2019-06-11 PROCEDURE — 71045 X-RAY EXAM CHEST 1 VIEW: CPT

## 2019-06-11 PROCEDURE — 86140 C-REACTIVE PROTEIN: CPT

## 2019-06-11 PROCEDURE — 700102 HCHG RX REV CODE 250 W/ 637 OVERRIDE(OP): Performed by: INTERNAL MEDICINE

## 2019-06-11 PROCEDURE — 700102 HCHG RX REV CODE 250 W/ 637 OVERRIDE(OP): Performed by: HOSPITALIST

## 2019-06-11 PROCEDURE — 83605 ASSAY OF LACTIC ACID: CPT

## 2019-06-11 PROCEDURE — 81001 URINALYSIS AUTO W/SCOPE: CPT

## 2019-06-11 PROCEDURE — 93005 ELECTROCARDIOGRAM TRACING: CPT

## 2019-06-11 PROCEDURE — 80307 DRUG TEST PRSMV CHEM ANLYZR: CPT

## 2019-06-11 PROCEDURE — 84484 ASSAY OF TROPONIN QUANT: CPT

## 2019-06-11 PROCEDURE — 99223 1ST HOSP IP/OBS HIGH 75: CPT | Performed by: HOSPITALIST

## 2019-06-11 PROCEDURE — 87086 URINE CULTURE/COLONY COUNT: CPT

## 2019-06-11 RX ORDER — DULOXETIN HYDROCHLORIDE 30 MG/1
30 CAPSULE, DELAYED RELEASE ORAL DAILY
Refills: 1 | COMMUNITY
Start: 2019-04-08 | End: 2019-07-09

## 2019-06-11 RX ORDER — DULOXETIN HYDROCHLORIDE 30 MG/1
30 CAPSULE, DELAYED RELEASE ORAL DAILY
Status: DISCONTINUED | OUTPATIENT
Start: 2019-06-12 | End: 2019-06-12 | Stop reason: HOSPADM

## 2019-06-11 RX ORDER — SUMATRIPTAN 50 MG/1
50 TABLET, FILM COATED ORAL
COMMUNITY
End: 2021-04-16 | Stop reason: SDUPTHER

## 2019-06-11 RX ORDER — SODIUM CHLORIDE 9 MG/ML
INJECTION, SOLUTION INTRAVENOUS CONTINUOUS
Status: DISCONTINUED | OUTPATIENT
Start: 2019-06-11 | End: 2019-06-12 | Stop reason: HOSPADM

## 2019-06-11 RX ORDER — PROMETHAZINE HYDROCHLORIDE 25 MG/1
12.5-25 TABLET ORAL EVERY 4 HOURS PRN
Status: DISCONTINUED | OUTPATIENT
Start: 2019-06-11 | End: 2019-06-12 | Stop reason: HOSPADM

## 2019-06-11 RX ORDER — TRAZODONE HYDROCHLORIDE 50 MG/1
50 TABLET ORAL
Status: DISCONTINUED | OUTPATIENT
Start: 2019-06-11 | End: 2019-06-12 | Stop reason: HOSPADM

## 2019-06-11 RX ORDER — DIAZEPAM 10 MG/1
10 TABLET ORAL
COMMUNITY
End: 2019-11-06

## 2019-06-11 RX ORDER — AMOXICILLIN 250 MG
2 CAPSULE ORAL 2 TIMES DAILY
Status: DISCONTINUED | OUTPATIENT
Start: 2019-06-12 | End: 2019-06-12 | Stop reason: HOSPADM

## 2019-06-11 RX ORDER — SERTRALINE HYDROCHLORIDE 25 MG/1
25 TABLET, FILM COATED ORAL
Refills: 0 | COMMUNITY
Start: 2019-04-15 | End: 2019-11-06

## 2019-06-11 RX ORDER — PROMETHAZINE HYDROCHLORIDE 25 MG/1
12.5-25 SUPPOSITORY RECTAL EVERY 4 HOURS PRN
Status: DISCONTINUED | OUTPATIENT
Start: 2019-06-11 | End: 2019-06-12 | Stop reason: HOSPADM

## 2019-06-11 RX ORDER — ONDANSETRON 2 MG/ML
4 INJECTION INTRAMUSCULAR; INTRAVENOUS EVERY 4 HOURS PRN
Status: DISCONTINUED | OUTPATIENT
Start: 2019-06-11 | End: 2019-06-12 | Stop reason: HOSPADM

## 2019-06-11 RX ORDER — OXYCODONE HYDROCHLORIDE 10 MG/1
10 TABLET ORAL EVERY 6 HOURS PRN
COMMUNITY
End: 2019-11-06

## 2019-06-11 RX ORDER — ONDANSETRON 4 MG/1
4 TABLET, ORALLY DISINTEGRATING ORAL EVERY 4 HOURS PRN
Status: DISCONTINUED | OUTPATIENT
Start: 2019-06-11 | End: 2019-06-12 | Stop reason: HOSPADM

## 2019-06-11 RX ORDER — POLYETHYLENE GLYCOL 3350 17 G/17G
1 POWDER, FOR SOLUTION ORAL
Status: DISCONTINUED | OUTPATIENT
Start: 2019-06-11 | End: 2019-06-12 | Stop reason: HOSPADM

## 2019-06-11 RX ORDER — BISACODYL 10 MG
10 SUPPOSITORY, RECTAL RECTAL
Status: DISCONTINUED | OUTPATIENT
Start: 2019-06-11 | End: 2019-06-12 | Stop reason: HOSPADM

## 2019-06-11 RX ORDER — TIZANIDINE 2 MG/1
2 TABLET ORAL 2 TIMES DAILY PRN
COMMUNITY
End: 2019-06-14

## 2019-06-11 RX ORDER — ACETAMINOPHEN 325 MG/1
650 TABLET ORAL EVERY 4 HOURS PRN
Status: DISCONTINUED | OUTPATIENT
Start: 2019-06-11 | End: 2019-06-12 | Stop reason: HOSPADM

## 2019-06-11 RX ADMIN — SODIUM CHLORIDE: 9 INJECTION, SOLUTION INTRAVENOUS at 20:56

## 2019-06-11 RX ADMIN — ACETAMINOPHEN 650 MG: 325 TABLET, FILM COATED ORAL at 21:46

## 2019-06-11 RX ADMIN — SERTRALINE HYDROCHLORIDE 25 MG: 50 TABLET ORAL at 20:56

## 2019-06-11 RX ADMIN — SODIUM CHLORIDE: 9 INJECTION, SOLUTION INTRAVENOUS at 15:30

## 2019-06-11 ASSESSMENT — LIFESTYLE VARIABLES: EVER_SMOKED: YES

## 2019-06-11 ASSESSMENT — COPD QUESTIONNAIRES
DO YOU EVER COUGH UP ANY MUCUS OR PHLEGM?: NO/ONLY WITH OCCASIONAL COLDS OR INFECTIONS
HAVE YOU SMOKED AT LEAST 100 CIGARETTES IN YOUR ENTIRE LIFE: YES
COPD SCREENING SCORE: 4
DURING THE PAST 4 WEEKS HOW MUCH DID YOU FEEL SHORT OF BREATH: NONE/LITTLE OF THE TIME

## 2019-06-11 NOTE — ED PROVIDER NOTES
ED Provider Note    Scribed for Dangelo Lopez M.D. by Thom Roy. 6/11/2019  12:10 PM    Primary care provider: Nancy Hunt M.D.  Means of arrival: wheel chair  History obtained from: family members  History limited by: altered level of consciousness     CHIEF COMPLAINT  Chief Complaint   Patient presents with   • Weakness   • ALOC   • Hypotension       HPI  Elina Skaggs is a 59 y.o. female who presents to the Emergency Department due to an altered level of consciousness. Per family, patient was found on the floor by her sister approximately 2 to 3 hours ago. At this time, she was not making sense and unable to fully speak. In regards to her recent hospitalization, her family notes she is acting the same as her previous presentation. Patient was also found to be hypotensive in triage. Notes associated fatigue/weakness. Denies any chest pain, shortness of breath, nausea, vomiting, or abdominal pain. She last took her medications this morning; she monitors her own medications at this time. Further history of present illness cannot be obtained due to the patient's altered level of consciousness.    REVIEW OF SYSTEMS  Pertinent positives include altered level of consciousness, weakness, and fatigue. Pertinent negatives include no chest pain, shortness of breath, nausea, vomiting, or abdominal pain.  Further review of systems cannot be obtained due to the patient's altered level of consciousness.     PAST MEDICAL HISTORY   has a past medical history of Anxiety (1993); Chronic back pain; Constipation (9/27/2013); Dental disorder; Hyperlipidemia; Hypertension; Impaired physical mobility (9/30/2013); MEDICAL HOME (6/2015); Psychiatric problem; Sleep apnea; and Snoring.    SURGICAL HISTORY   has a past surgical history that includes tubal coagulation laparoscopic bilateral; appendectomy; appendectomy child (1974); cervical disk and fusion anterior (11/16/2010); cervical fusion posterior (6/19/2012); and  "colonoscopy with polyp (4/1/13).    SOCIAL HISTORY  Social History   Substance Use Topics   • Smoking status: Current Every Day Smoker     Packs/day: 0.40     Years: 37.00     Types: Cigarettes   • Smokeless tobacco: Never Used      Comment: 4 cig/day with chantix   • Alcohol use No      History   Drug Use No       FAMILY HISTORY  Family History   Problem Relation Age of Onset   • Cancer Mother         lung cancer   • Heart Disease Father         MI   • Diabetes Father    • Genitourinary () Father         renal failure on dialysis   • Diabetes Sister    • Genitourinary () Sister         renal failure   • Diabetes Brother        CURRENT MEDICATIONS   Home Medications     Reviewed by Gurmeet Monroy (Pharmacy Tech) on 06/11/19 at 1308  Med List Status: Complete   Medication Last Dose Status   albuterol 108 (90 Base) MCG/ACT Aero Soln inhalation aerosol 6/5/2019 Active   diazepam (VALIUM) 10 MG tablet 6/10/2019 Active   DULoxetine (CYMBALTA) 30 MG Cap DR Particles 6/11/2019 Active   enalapril (VASOTEC) 20 MG tablet 6/11/2019 Active   morphine ER (MS CONTIN) 15 MG Tab CR tablet 6/11/2019 Active   oxyCODONE immediate release (ROXICODONE) 10 MG immediate release tablet 6/11/2019 Active   sertraline (ZOLOFT) 25 MG tablet 6/10/2019 Active   SUMAtriptan (IMITREX) 50 MG Tab > 1 WEEK Active   tizanidine (ZANAFLEX) 2 MG tablet 6/10/2019 Active                ALLERGIES  No Known Allergies    PHYSICAL EXAM  VITAL SIGNS: BP (!) 83/53 Comment: second pressure 80 54  Pulse 71   Temp 36.3 °C (97.4 °F) (Temporal)   Resp 14   Ht 1.626 m (5' 4\")   SpO2 90%   BMI 24.71 kg/m²     Constitutional: Well developed, Well nourished, no acute distress, Non-toxic appearance.   HENT: Normocephalic, Atraumatic, Bilateral external ears normal, slightly dry mucous membranes, No oral exudates.   Eyes: Pupils are 3 mm and reactive, EOMI, Conjunctiva normal, No discharge.   Neck: No tenderness, Supple, No stridor.   Lymphatic: No " lymphadenopathy noted.   Cardiovascular: Normal heart rate, Normal rhythm.   Thorax & Lungs: Clear to auscultation bilaterally, No respiratory distress, No wheezing, No crackles.   Abdomen: Soft, No tenderness, No masses, No pulsatile masses.   Skin: Warm, Dry, No erythema, No rash.   Extremities:, No edema No cyanosis.   Musculoskeletal: No tenderness to palpation or major deformities noted.  Intact distal pulses  Neurologic: Very sleep. She falls asleep during the history and physical exam; she will awaken but speaks in slurred speech   Psychiatric: Affect normal, Judgment normal, Mood normal.       LABS  Labs Reviewed   CBC WITH DIFFERENTIAL - Abnormal; Notable for the following:        Result Value    WBC 4.6 (*)     RBC 4.13 (*)     MCHC 31.9 (*)     Neutrophils-Polys 33.60 (*)     Lymphocytes 53.70 (*)     Neutrophils (Absolute) 1.55 (*)     All other components within normal limits   URINALYSIS - Abnormal; Notable for the following:     Leukocyte Esterase Small (*)     Occult Blood Trace (*)     All other components within normal limits    Narrative:     Indication for culture:->Emergency Room Patient   URINE DRUG SCREEN - Abnormal; Notable for the following:     Benzodiazepines Positive (*)     Opiates Positive (*)     Oxycodone Positive (*)     All other components within normal limits    Narrative:     Indication for culture:->Emergency Room Patient   ESTIMATED GFR - Abnormal; Notable for the following:     GFR If  58 (*)     GFR If Non  48 (*)     All other components within normal limits   URINE MICROSCOPIC (W/UA) - Abnormal; Notable for the following:     WBC 5-10 (*)     RBC 5-10 (*)     Hyaline Cast 6-10 (*)     All other components within normal limits    Narrative:     Indication for culture:->Emergency Room Patient   LACTIC ACID   COMP METABOLIC PANEL   URINE CULTURE(NEW)    Narrative:     Indication for culture:->Emergency Room Patient   BLOOD CULTURE    Narrative:   "   Per Hospital Policy: Only change Specimen Src: to \"Line\" if  specified by physician order.   BLOOD CULTURE    Narrative:     Per Hospital Policy: Only change Specimen Src: to \"Line\" if  specified by physician order.   TROPONIN   DIAGNOSTIC ALCOHOL   WESTERGREN SED RATE   CRP QUANTITIVE (NON-CARDIAC)   PROCALCITONIN   LACTIC ACID   URINE DRUG SCREEN     All labs reviewed by me.    EKG  Results for orders placed or performed during the hospital encounter of 19   EKG (NOW)   Result Value Ref Range    Report       [facility] Emergency Dept.    Test Date:  2019  Pt Name:    CHAYA KRISHNA               Department: ER  MRN:        [id number]                      Room:  Gender:     Female                       Technician: [id number]  :        [id number]                   Requested By:ER TRIAGE PROTOCOL  Order #:    [id number]                    Reading MD: ANURAG SMALL MD    Measurements  Intervals                                Axis  Rate:       69                           P:          11  KY:         128                          QRS:        26  QRSD:       82                           T:          12  QT:         416  QTc:        446    Interpretive Statements  SINUS RHYTHM  BASELINE WANDER IN LEAD(S) V6  Compared to ECG 2019 21:38:26  No significant changes    Electronically Signed On 2019 14:23:00 PDT by ANURAG SMALL MD           RADIOLOGY  DX-CHEST-PORTABLE (1 VIEW)   Final Result      Stable prominence of the cardiomediastinal silhouette.      Patchy left basilar airspace opacity may represent atelectasis or pneumonitis.        The radiologist's interpretation of all radiological studies have been reviewed by me.      COURSE & MEDICAL DECISION MAKING  Pertinent Labs & Imaging studies reviewed. (See chart for details)    I reviewed the patient's medical records which showed that the patient was recently admitted to the hospital 6 days ago and discharged 3 days ago. " She was initially brought into the ED via EMS for AMS, and she was then admitted for hypotension, AMS, and acute kidney injury without any history of renal disease. It was then found that the patient had taken twice the amount of prescribed Neurontin along with her prescribed long-acting morphine and percocet. During her stay, she was treated with supportive care and IV fluids.     12:10 PM - Patient seen and examined at bedside. Discussed that I will not be treating the patient with Narcan as this would most likely cause her to go into opioid withdrawals. Ordered DX-chest, lactic acid, diagnostic alcohol, UDS, troponin, CBC with diff, CMP, UA, urine culture, blood culture and EKG to evaluate her symptoms. The differential diagnoses include but are not limited to: sepsis vs medication overdose vs urinary tract infection. Discussed with the patient and her family that she will most likely require admission.    2:21 PM - I spoke to Dr. Gooden (hospitalist) regarding the patient’s pertinent abnormalities. He accepts admission of the patient. Care has been turned over at this time, and orders have been placed.         Decision Making:  Patient with altered mental status, somnolence likely secondary to polypharmacy, cannot find any acute source for the patient's altered mental status.  The patient will be admitted to the hospitalist in guarded condition.      DISPOSITION:  Patient will be admitted to Dr. Gooden (hospitalist) in guarded condition.      FINAL IMPRESSION  1. Somnolence    2. Hypotension, unspecified hypotension type          I, Thom Roy (Scribe), am scribing for, and in the presence of, Dangelo Lopez M.D..    Electronically signed by: Thom Roy (Scribe), 6/11/2019    IDangelo M.D. personally performed the services described in this documentation, as scribed by Thom Roy in my presence, and it is both accurate and complete. C    The note accurately reflects work and decisions  made by me.  Dangelo Lopez  6/11/2019  5:36 PM

## 2019-06-11 NOTE — ASSESSMENT & PLAN NOTE
Improved in the ER with IVF   Suspect due to polypharmacy   Do not suspect sepsis   Cont to monitor

## 2019-06-11 NOTE — ED TRIAGE NOTES
Pt BIB family with c/c of hypotension and near syncope. Pt reporting she was recently diagnosed with aneurysm, however family stating pt just had an MRI which didn't show it. Family concerned for her kidney function and also reporting she was placed on several b/p meds recently

## 2019-06-11 NOTE — H&P
Hospital Medicine History & Physical Note    Date of Service  6/11/2019    Primary Care Physician  Nancy Hunt M.D.    Consultants  none    Code Status  full    Chief Complaint  aloc     History of Presenting Illness  59 y.o. female who presented 6/11/2019 with past medical history of chronic back pain who is on multiple opiate medications at home, anxiety, hypertension, depression, tobacco dependence who presents with encephalopathy.  This patient presented this morning found on the floor by her sister as she had altered level of consciousness.  She recently had similar hospitalization where she had a opiate overdose.  She was not making any sense and unable to speak fully.  Here in the emergency department her symptoms are improving.  She also continues to be hypotensive fatigue weakness.  History is limited secondary patient mental status change.    Review of Systems  Review of Systems   Unable to perform ROS: Mental status change       Past Medical History   has a past medical history of Anxiety (1993); Chronic back pain; Constipation (9/27/2013); Dental disorder; Hyperlipidemia; Hypertension; Impaired physical mobility (9/30/2013); MEDICAL HOME (6/2015); Psychiatric problem; Sleep apnea; and Snoring.    Surgical History   has a past surgical history that includes tubal coagulation laparoscopic bilateral; appendectomy; appendectomy child (1974); cervical disk and fusion anterior (11/16/2010); cervical fusion posterior (6/19/2012); and colonoscopy with polyp (4/1/13).     Family History  family history includes Cancer in her mother; Diabetes in her brother, father, and sister; Genitourinary () in her father and sister; Heart Disease in her father.     Social History   reports that she has been smoking Cigarettes.  She has a 14.80 pack-year smoking history. She has never used smokeless tobacco. She reports that she does not drink alcohol or use drugs.    Allergies  No Known Allergies    Medications  Prior to  Admission Medications   Prescriptions Last Dose Informant Patient Reported? Taking?   DULoxetine (CYMBALTA) 30 MG Cap DR Particles 6/11/2019 at AM Patient Yes No   Sig: Take 30 mg by mouth every day.   SUMAtriptan (IMITREX) 50 MG Tab > 1 WEEK at PRN Patient Yes Yes   Sig: Take 50 mg by mouth 1 time daily as needed for Migraine.   albuterol 108 (90 Base) MCG/ACT Aero Soln inhalation aerosol 6/5/2019 at PRN Patient No No   Sig: Inhale 2 Puffs by mouth every 6 hours as needed for Shortness of Breath.   diazepam (VALIUM) 10 MG tablet 6/10/2019 at PM Patient Yes Yes   Sig: Take 10 mg by mouth 1 time daily as needed for Anxiety or Sleep.   enalapril (VASOTEC) 20 MG tablet 6/11/2019 at AM Patient No No   Sig: Take 1 Tab by mouth every day.   morphine ER (MS CONTIN) 15 MG Tab CR tablet 6/11/2019 at AM Patient Yes No   Sig: Take 15 mg by mouth every day.   oxyCODONE immediate release (ROXICODONE) 10 MG immediate release tablet 6/11/2019 at AM Patient Yes Yes   Sig: Take 10 mg by mouth every 6 hours as needed for Moderate Pain.   sertraline (ZOLOFT) 25 MG tablet 6/10/2019 at PM Patient Yes No   Sig: Take 25 mg by mouth every bedtime.   tizanidine (ZANAFLEX) 2 MG tablet 6/10/2019 at PM Patient Yes Yes   Sig: Take 2 mg by mouth 2 times a day as needed.      Facility-Administered Medications: None       Physical Exam  Temp:  [36.3 °C (97.4 °F)-36.9 °C (98.4 °F)] 36.3 °C (97.4 °F)  Pulse:  [61-73] 66  Resp:  [14-18] 14  BP: (83-92)/(53-64) 83/53  SpO2:  [90 %-100 %] 100 %    Physical Exam   Constitutional: She appears well-developed and well-nourished.   HENT:   Head: Normocephalic and atraumatic.   Dry oral mucosa   Eyes: Pupils are equal, round, and reactive to light. Conjunctivae and EOM are normal.   Neck: Normal range of motion. Neck supple. No JVD present.   Cardiovascular: Normal rate, regular rhythm, normal heart sounds and intact distal pulses.    No murmur heard.  Pulmonary/Chest: Effort normal and breath sounds normal.  No respiratory distress. She has no wheezes.   Abdominal: Soft. Bowel sounds are normal. She exhibits no distension. There is no tenderness.   Musculoskeletal: She exhibits no edema.   Neurological: She exhibits normal muscle tone.   Lethargic    Skin: Skin is warm and dry.   Psychiatric:   confused   Nursing note and vitals reviewed.      Laboratory:  Recent Labs      06/11/19   1145   WBC  4.6*   RBC  4.13*   HEMOGLOBIN  12.2   HEMATOCRIT  38.2   MCV  92.5   MCH  29.5   MCHC  31.9*   RDW  46.6   PLATELETCT  337   MPV  9.7     Recent Labs      06/11/19   1145   SODIUM  141   POTASSIUM  4.4   CHLORIDE  106   CO2  28   GLUCOSE  69   BUN  13   CREATININE  1.15   CALCIUM  9.5     Recent Labs      06/11/19   1145   ALTSGPT  24   ASTSGOT  21   ALKPHOSPHAT  85   TBILIRUBIN  0.4   GLUCOSE  69                 Recent Labs      06/11/19   1145   TROPONINI  <0.01       Urinalysis:    No results found     Imaging:  DX-CHEST-PORTABLE (1 VIEW)   Final Result      Stable prominence of the cardiomediastinal silhouette.      Patchy left basilar airspace opacity may represent atelectasis or pneumonitis.            Assessment/Plan:  I anticipate this patient will require at least two midnights for appropriate medical management, necessitating inpatient admission.    * Encephalopathy   Assessment & Plan    Unclear etiology but highly suspect polypharmacy   Patient states she has ran out of opiate medications and next refill is on the 13th  UDS pending   UA pending   Hold home neuroleptic medications, opiates and benzo  No evidence of infection or sepsis, hold on abx   Cont with IVF   TSH ordered  Symptoms improving in the ER      Leukopenia   Assessment & Plan    Mild, no evidence of infection cont to montior      Hypotension   Assessment & Plan    Improved in the ER with IVF   Suspect due to polypharmacy   Do not suspect sepsis   Cont to monitor      Cigarette nicotine dependence without complication- (present on admission)    Assessment & Plan    Needs smoking cessation counseling      Depression- (present on admission)   Assessment & Plan    Resume home zoloft and cymbalta         VTE prophylaxis: heparin

## 2019-06-11 NOTE — ASSESSMENT & PLAN NOTE
Unclear etiology but highly suspect polypharmacy   Patient states she has ran out of opiate medications and next refill is on the 13th  UDS pending   UA pending   Hold home neuroleptic medications, opiates and benzo  No evidence of infection or sepsis, hold on abx   Cont with IVF   TSH ordered  Symptoms improving in the ER

## 2019-06-11 NOTE — ED NOTES
Med Rec complete per Pt at bedside and Pt's pharmacy  Allergies Reviewed  No ABX in the last 30 days

## 2019-06-12 ENCOUNTER — TELEPHONE (OUTPATIENT)
Dept: MEDICAL GROUP | Facility: PHYSICIAN GROUP | Age: 59
End: 2019-06-12

## 2019-06-12 ENCOUNTER — PATIENT OUTREACH (OUTPATIENT)
Dept: HEALTH INFORMATION MANAGEMENT | Facility: OTHER | Age: 59
End: 2019-06-12

## 2019-06-12 VITALS
TEMPERATURE: 97.5 F | OXYGEN SATURATION: 97 % | BODY MASS INDEX: 23.45 KG/M2 | HEIGHT: 64 IN | RESPIRATION RATE: 16 BRPM | HEART RATE: 63 BPM | WEIGHT: 137.35 LBS | DIASTOLIC BLOOD PRESSURE: 87 MMHG | SYSTOLIC BLOOD PRESSURE: 164 MMHG

## 2019-06-12 LAB
BASOPHILS # BLD AUTO: 0.8 % (ref 0–1.8)
BASOPHILS # BLD: 0.04 K/UL (ref 0–0.12)
EOSINOPHIL # BLD AUTO: 0.14 K/UL (ref 0–0.51)
EOSINOPHIL NFR BLD: 2.6 % (ref 0–6.9)
ERYTHROCYTE [DISTWIDTH] IN BLOOD BY AUTOMATED COUNT: 46.5 FL (ref 35.9–50)
HCT VFR BLD AUTO: 35.7 % (ref 37–47)
HGB BLD-MCNC: 11.2 G/DL (ref 12–16)
IMM GRANULOCYTES # BLD AUTO: 0.01 K/UL (ref 0–0.11)
IMM GRANULOCYTES NFR BLD AUTO: 0.2 % (ref 0–0.9)
LYMPHOCYTES # BLD AUTO: 3.02 K/UL (ref 1–4.8)
LYMPHOCYTES NFR BLD: 56.9 % (ref 22–41)
MCH RBC QN AUTO: 29.3 PG (ref 27–33)
MCHC RBC AUTO-ENTMCNC: 31.4 G/DL (ref 33.6–35)
MCV RBC AUTO: 93.5 FL (ref 81.4–97.8)
MONOCYTES # BLD AUTO: 0.34 K/UL (ref 0–0.85)
MONOCYTES NFR BLD AUTO: 6.4 % (ref 0–13.4)
NEUTROPHILS # BLD AUTO: 1.76 K/UL (ref 2–7.15)
NEUTROPHILS NFR BLD: 33.1 % (ref 44–72)
NRBC # BLD AUTO: 0 K/UL
NRBC BLD-RTO: 0 /100 WBC
PLATELET # BLD AUTO: 302 K/UL (ref 164–446)
PMV BLD AUTO: 10 FL (ref 9–12.9)
RBC # BLD AUTO: 3.82 M/UL (ref 4.2–5.4)
WBC # BLD AUTO: 5.3 K/UL (ref 4.8–10.8)

## 2019-06-12 PROCEDURE — A9270 NON-COVERED ITEM OR SERVICE: HCPCS | Performed by: INTERNAL MEDICINE

## 2019-06-12 PROCEDURE — 36415 COLL VENOUS BLD VENIPUNCTURE: CPT

## 2019-06-12 PROCEDURE — A9270 NON-COVERED ITEM OR SERVICE: HCPCS | Performed by: HOSPITALIST

## 2019-06-12 PROCEDURE — 700102 HCHG RX REV CODE 250 W/ 637 OVERRIDE(OP): Performed by: INTERNAL MEDICINE

## 2019-06-12 PROCEDURE — 700105 HCHG RX REV CODE 258: Performed by: HOSPITALIST

## 2019-06-12 PROCEDURE — 99239 HOSP IP/OBS DSCHRG MGMT >30: CPT | Performed by: HOSPITALIST

## 2019-06-12 PROCEDURE — 700102 HCHG RX REV CODE 250 W/ 637 OVERRIDE(OP): Performed by: HOSPITALIST

## 2019-06-12 PROCEDURE — 85025 COMPLETE CBC W/AUTO DIFF WBC: CPT

## 2019-06-12 RX ADMIN — ACETAMINOPHEN 650 MG: 325 TABLET, FILM COATED ORAL at 05:30

## 2019-06-12 RX ADMIN — SODIUM CHLORIDE: 9 INJECTION, SOLUTION INTRAVENOUS at 05:29

## 2019-06-12 ASSESSMENT — PATIENT HEALTH QUESTIONNAIRE - PHQ9
2. FEELING DOWN, DEPRESSED, IRRITABLE, OR HOPELESS: NOT AT ALL
SUM OF ALL RESPONSES TO PHQ9 QUESTIONS 1 AND 2: 0
1. LITTLE INTEREST OR PLEASURE IN DOING THINGS: NOT AT ALL

## 2019-06-12 ASSESSMENT — COGNITIVE AND FUNCTIONAL STATUS - GENERAL
MOBILITY SCORE: 24
DAILY ACTIVITIY SCORE: 23
SUGGESTED CMS G CODE MODIFIER MOBILITY: CH
SUGGESTED CMS G CODE MODIFIER DAILY ACTIVITY: CI
DRESSING REGULAR LOWER BODY CLOTHING: A LITTLE

## 2019-06-12 NOTE — PROGRESS NOTES
Report received by day shift RN. Pt awake alert and oriented x 4 with c/o chronic back pain. Discussed reason of holiday of depressant medications and she expressed frustration. Monitor room notified and verified SB 58. Oriented to call system and unit policies and procedures. Pt. Verbalized understanding. Bed locked in low position with fall precautions in place and call light in reach.

## 2019-06-12 NOTE — CARE PLAN
Problem: Knowledge Deficit  Goal: Knowledge of disease process/condition, treatment plan, diagnostic tests, and medications will improve    Intervention: Explain information regarding disease process/condition, treatment plan, diagnostic tests, and medications and document in education  Pt educated to current POC r/t disease process. Pt verbalized understanding and expressed frustration.

## 2019-06-12 NOTE — TELEPHONE ENCOUNTER
Jesus is admitted into the Hospital and is calling asking why she has not been given her B/P Meds or her pain pills.  Informed patient that she needs to talk to the nurse that is assigned to her in the hospital and ask them why.

## 2019-06-12 NOTE — DISCHARGE INSTRUCTIONS
Discharge Instructions    Discharged to home by car with relative. Discharged via wheelchair, hospital escort: Yes.  Special equipment needed: Not Applicable    Be sure to schedule a follow-up appointment with your primary care doctor or any specialists as instructed.     Discharge Plan:   Smoking Cessation Offered: Patient Refused  Influenza Vaccine Indication: Not indicated: Previously immunized this influenza season and > 8 years of age    I understand that a diet low in cholesterol, fat, and sodium is recommended for good health. Unless I have been given specific instructions below for another diet, I accept this instruction as my diet prescription.   Other diet: REGULAR    Special Instructions:   Toxic Metabolic Encephalopathy  Toxic metabolic encephalopathy (TME) is a type of brain disorder caused by a change in brain chemistry. This condition may result from illnesses or conditions that cause an imbalance of fluid, minerals (electrolytes), and other substances in the body that affect the way the brain functions. It is not caused by brain damage or brain disease.  TME can cause confusion and other mental disturbances, which are generally referred to as delirium. Untreated delirium may lead to permanent mental changes or worsening medical conditions. Untreated delirium is a life-threatening condition that may need to be treated in the hospital.  What are the causes?  Possible causes of TME that can lead to delirium include:  · Short-term (acute) or long-term (chronic) disease of the kidney or liver.  · Not having enough fluid in the body (dehydration).  · Changes in the acid level (pH) of the blood.  · High or low levels of any of the following substances in the blood:  ¨ Calcium.  ¨ Salt (sodium).  ¨ Sugar (glucose).  ¨ Magnesium.  ¨ Phosphate.  · High body temperature.  · Not having enough oxygen in the blood.  · Low levels of B vitamins. This can result from alcohol abuse.  · Certain medicines, such as  steroids and medicines that reduce the activity of the immune system (immunosuppressants).  · Certain infections.  What increases the risk?  You may have a higher risk for TME if you:  · Are elderly.  · Have dementia.  · Are in the hospital, especially in intensive care.  · Live in a nursing home.  · Had recent surgery.  · Have liver or kidney disease.  · Have poorly controlled diabetes.  · Have chronic medical problems, especially heart or lung disease.  · Are not getting enough fluids.  · Have poor nutrition.  · Abuse alcohol.  What are the signs or symptoms?  Symptoms of TME may include:  · Muscle stiffness or jerking (spasticity).  · Shaking (tremors).  · Flapping of the hands.  · Weakness.  · Clumsiness.  · Slowed breathing.  · Jerky movements that you cannot control (seizures).  · Not being able to stay awake (drowsiness).  · Not being able to pay attention.  · Loss of consciousness (coma).  Symptoms of delirium caused by TME include:  · Confusion.  · Difficulty focusing or concentrating, or inability to focus or concentrate.  · Not knowing where you are (disorientation).  · Seeing or hearing things that are not real (hallucinations).  · Fearfulness.  · False beliefs (delusions).  · Changes in mood or personality.  · Changes in speech, such as saying things that do not make sense.  · Memory loss.  · Irritability.  · Avoiding other people (withdrawal).  · Depression.  · Poor judgment.  · Changes in eating and sleeping patterns.  · Hyperactivity.  · Decreased alertness.  · General mistrust of others (paranoia).  Delirium may come and go. Symptoms of delirium may start suddenly or gradually, and they often get worse at night.  How is this diagnosed?  This condition is diagnosed based on:  · Your symptoms and behavior.  · An exam to check how you are thinking, feeling, and behaving (mental status exam). To diagnose delirium, the mental status exam must rule out other possible causes of TME, and must  show:  ¨ Changes in attention and awareness.  ¨ Changes that develop over a short period of time and tend to come and go (fluctuate).  ¨ Changes in memory, language, and thinking that were not present before.  · A physical exam.  · Imaging tests, such as:  ¨ MRI.  ¨ CT scan.  · Blood tests to:  ¨ Measure liver and kidney function.  ¨ Check for a lack (deficiency) of vitamin B.  ¨ Check for changes in acid levels (pH) and changes in calcium, sodium, or magnesium levels in the blood.  ¨ Measure your blood sugar (glucose).  ¨ Measure your blood oxygen level.  How is this treated?  Treatment for TME depends on the cause, and it may include.  · Getting fluids through an IV tube.  · Regulating calcium, sodium, glucose, or magnesium levels in the body.  · Getting oxygen.  · Improving nutrition.  · Treating liver or kidney disease.  · Adjusting certain medicines.  · Treating infections.  If the cause is found and treated, delirium usually improves. Managing delirium may include:  · Keeping the room well-lit and quiet.  · Using calendars, pictures, and clocks to prevent disorientation.  · Having frequent checks from nursing staff and visits from caregivers.  · Wearing eyeglasses or a hearing aid, if needed.  · Physical therapy.  · Medicine to treat agitation, anxiety, hallucinations, or delusions.  Follow these instructions at home:  · Drink enough fluid to keep your urine clear or pale yellow.  · Take over-the-counter and prescription medicines only as told by your health care provider.  · Return to your normal activities as told by your health care provider. Ask your health care provider what activities are safe for you.  · Follow a healthy diet. Do not skip meals.  · Do not drink alcohol.  · Go to bed at the same time every night.  · Keep all follow-up visits as told by your health care provider. This is important.  Contact a health care provider if:  · You are unable to feed yourself or hydrate yourself.  · You need help  at home.  · You start to feel clumsy.  · You start to have tremors or weakness.  Get help right away if:  · You have a seizure.  · You lose consciousness.  · You have trouble breathing.  · You do not feel able to care for yourself at home.  · You have a fever.  · You become disoriented at home.  This information is not intended to replace advice given to you by your health care provider. Make sure you discuss any questions you have with your health care provider.  Document Released: 05/26/2017 Document Revised: 08/21/2017 Document Reviewed: 05/26/2017  [employer] Patient Education © 2017 [employer]      Is patient discharged on Warfarin / Coumadin?   No     Depression / Suicide Risk    As you are discharged from this [facility] facility, it is important to learn how to keep safe from harming yourself.    Recognize the warning signs:  · Abrupt changes in personality, positive or negative- including increase in energy   · Giving away possessions  · Change in eating patterns- significant weight changes-  positive or negative  · Change in sleeping patterns- unable to sleep or sleeping all the time   · Unwillingness or inability to communicate  · Depression  · Unusual sadness, discouragement and loneliness  · Talk of wanting to die  · Neglect of personal appearance   · Rebelliousness- reckless behavior  · Withdrawal from people/activities they love  · Confusion- inability to concentrate     If you or a loved one observes any of these behaviors or has concerns about self-harm, here's what you can do:  · Talk about it- your feelings and reasons for harming yourself  · Remove any means that you might use to hurt yourself (examples: pills, rope, extension cords, firearm)  · Get professional help from the community (Mental Health, Substance Abuse, psychological counseling)  · Do not be alone:Call your Safe Contact- someone whom you trust who will be there for you.  · Call your local CRISIS HOTLINE 455-3206 or  670-726-7872  · Call your local Children's Mobile Crisis Response Team [place] (900) 567-5977 or www.[employer].ecobee  · Call the toll free National Suicide Prevention Hotlines   · National Suicide Prevention Lifeline 352-223-ZZMP (2877)  · National [employer] Line Network 800-SUICIDE (147-9786)

## 2019-06-12 NOTE — PROGRESS NOTES
Discharge Summary  Educated patient on s/s to look for when returning to the ER. Patient verbalized understanding. No changes made with medications. PIV removed with no s/s of bleeding or infection at site. Vitals WNL. Escorted patient downstairs for discharge home.

## 2019-06-12 NOTE — DISCHARGE SUMMARY
Discharge Summary    CHIEF COMPLAINT ON ADMISSION  Chief Complaint   Patient presents with   • Weakness   • ALOC   • Hypotension     Reason for Admission  fell down and hit head, confused      Admission Date  6/11/2019    CODE STATUS  Full Code    HPI & HOSPITAL COURSE  59 y.o. female who presented 6/11/2019 with past medical history of chronic back pain who is on multiple opiate medications at home, anxiety, hypertension, depression, tobacco dependence who presents with encephalopathy. Found down at home by sister. All meds held and AMS cleared, patient was very upset we would not restart her pain medications. Has outpatient pain clinic she follows up in. Pharmacy visited her, offering Narcan nasal spray, she reported she already has this at home, does not need more. Does not think she took too many meds, tells me only took half gabapentin and 1 pain pill. I recommended she follows up with her physicians but that I would not restart her medications here. She Was cleared by therapies, alert and oriented, denies CP or SOB, overall exam reassuring. Advised to be safe at home, no driving while under use of pain medications, she expressed understanding, reported that she would rather go home.     UA negative, PCT negative, trops negative. No WBC elevation, overall labs and imaging reassuring.        Therefore, she is discharged in fair and stable condition to home with close outpatient follow-up.    The patient recovered much more quickly than anticipated on admission.    Discharge Date  6/12/2019    FOLLOW UP ITEMS POST DISCHARGE  Continue home medications  Follow up PCP and pain clinic    DISCHARGE DIAGNOSES  Principal Problem:    Encephalopathy POA: Unknown  Active Problems:    Depression POA: Yes    Cigarette nicotine dependence without complication POA: Yes    Hypotension POA: Unknown    Leukopenia POA: Unknown  Resolved Problems:    * No resolved hospital problems. *      FOLLOW UP  Future Appointments  Date Time  Provider Department [place]   6/13/2019 1:00 PM [address]   7/8/2019 11:00 AM [address]     No follow-up provider specified.    MEDICATIONS ON DISCHARGE     Medication List      CONTINUE taking these medications      Instructions   albuterol 108 (90 Base) MCG/ACT Aers inhalation aerosol   Inhale 2 Puffs by mouth every 6 hours as needed for Shortness of Breath.  Dose:  2 Puff     diazepam 10 MG tablet  Commonly known as:  VALIUM   Take 10 mg by mouth 1 time daily as needed for Anxiety or Sleep.  Dose:  10 mg     DULoxetine 30 MG Cpep  Commonly known as:  CYMBALTA   Take 30 mg by mouth every day.  Dose:  30 mg     enalapril 20 MG tablet  Commonly known as:  VASOTEC   Take 1 Tab by mouth every day.  Dose:  20 mg     morphine ER 15 MG Tbcr tablet  Commonly known as:  MS CONTIN   Take 15 mg by mouth every day.  Dose:  15 mg     oxyCODONE immediate release 10 MG immediate release tablet  Commonly known as:  ROXICODONE   Take 10 mg by mouth every 6 hours as needed for Moderate Pain.  Dose:  10 mg     sertraline 25 MG tablet  Commonly known as:  ZOLOFT   Take 25 mg by mouth every bedtime.  Dose:  25 mg     SUMAtriptan 50 MG Tabs  Commonly known as:  IMITREX   Take 50 mg by mouth 1 time daily as needed for Migraine.  Dose:  50 mg     tizanidine 2 MG tablet  Commonly known as:  ZANAFLEX   Take 2 mg by mouth 2 times a day as needed.  Dose:  2 mg            Allergies  Allergies   Allergen Reactions   • Cymbalta [Duloxetine Hcl] Rash and Itching     Redness to anterior neck.       DIET  Orders Placed This Encounter   Procedures   • Diet Order Regular     Standing Status:   Standing     Number of Occurrences:   1     Order Specific Question:   Diet:     Answer:   Regular [1]       ACTIVITY  As tolerated.  Weight bearing as tolerated    CONSULTATIONS  None    PROCEDURES  None    LABORATORY  Lab Results   Component Value Date    SODIUM 141 06/11/2019    POTASSIUM 4.4 06/11/2019     CHLORIDE 106 06/11/2019    CO2 28 06/11/2019    GLUCOSE 69 06/11/2019    BUN 13 06/11/2019    CREATININE 1.15 06/11/2019    CREATININE 0.7 02/04/2007        Lab Results   Component Value Date    WBC 5.3 06/12/2019    HEMOGLOBIN 11.2 (L) 06/12/2019    HEMATOCRIT 35.7 (L) 06/12/2019    PLATELETCT 302 06/12/2019        Total time of the discharge process exceeds 33 minutes.

## 2019-06-12 NOTE — ED NOTES
"Pt used call light, when this author entered room, was met with hostility and vulgar words..  Pt has been updated on plan of care,  \"well I want some fucking food and my room\"  Pt educated on process, educated on language.  "

## 2019-06-12 NOTE — PROGRESS NOTES
Naloxone 4 mg/0.1 mL nasal spray was offered to the patient for prevention of potential opioid related overdose per protocol. The patient  declined to receive the opioid antagonist. Patient reported that she already had naloxone at home.    Susanna Clark, KathyaD

## 2019-06-12 NOTE — DISCHARGE PLANNING
Anticipated Discharge Disposition: Pending    Action: LSW attempted to meet with pt at bedside to complete assessment, however, pt was not in room. LSW to return at a later time to speak with pt.     Barriers to Discharge: N/A    Plan: LSW to complete assessment with pt later today.

## 2019-06-12 NOTE — CARE PLAN
Problem: Safety  Goal: Will remain free from injury  Outcome: PROGRESSING AS EXPECTED  Pt oriented to call system and verbalized understanding. Environmental hazards removed from room to path to the bathroom.

## 2019-06-13 ENCOUNTER — OFFICE VISIT (OUTPATIENT)
Dept: MEDICAL GROUP | Facility: PHYSICIAN GROUP | Age: 59
End: 2019-06-13
Payer: COMMERCIAL

## 2019-06-13 ENCOUNTER — HOSPITAL ENCOUNTER (OUTPATIENT)
Facility: MEDICAL CENTER | Age: 59
End: 2019-06-13
Attending: FAMILY MEDICINE
Payer: COMMERCIAL

## 2019-06-13 VITALS
TEMPERATURE: 98.5 F | HEIGHT: 64 IN | HEART RATE: 90 BPM | WEIGHT: 135.58 LBS | DIASTOLIC BLOOD PRESSURE: 70 MMHG | SYSTOLIC BLOOD PRESSURE: 124 MMHG | OXYGEN SATURATION: 96 % | BODY MASS INDEX: 23.15 KG/M2

## 2019-06-13 DIAGNOSIS — B37.31 YEAST VAGINITIS: ICD-10-CM

## 2019-06-13 DIAGNOSIS — I95.9 HYPOTENSION, UNSPECIFIED HYPOTENSION TYPE: ICD-10-CM

## 2019-06-13 DIAGNOSIS — N17.9 AKI (ACUTE KIDNEY INJURY) (HCC): ICD-10-CM

## 2019-06-13 DIAGNOSIS — R40.4 ALTERED LEVEL OF CONSCIOUSNESS: ICD-10-CM

## 2019-06-13 LAB
BACTERIA UR CULT: NORMAL
SIGNIFICANT IND 70042: NORMAL
SITE SITE: NORMAL
SOURCE SOURCE: NORMAL

## 2019-06-13 PROCEDURE — 87660 TRICHOMONAS VAGIN DIR PROBE: CPT

## 2019-06-13 PROCEDURE — 87491 CHLMYD TRACH DNA AMP PROBE: CPT | Mod: GZ

## 2019-06-13 PROCEDURE — 99000 SPECIMEN HANDLING OFFICE-LAB: CPT | Performed by: FAMILY MEDICINE

## 2019-06-13 PROCEDURE — 87591 N.GONORRHOEAE DNA AMP PROB: CPT | Mod: GZ

## 2019-06-13 PROCEDURE — 99215 OFFICE O/P EST HI 40 MIN: CPT | Performed by: FAMILY MEDICINE

## 2019-06-13 PROCEDURE — 87510 GARDNER VAG DNA DIR PROBE: CPT

## 2019-06-13 PROCEDURE — 87480 CANDIDA DNA DIR PROBE: CPT

## 2019-06-13 RX ORDER — FLUCONAZOLE 150 MG/1
TABLET ORAL
Qty: 1 TAB | Refills: 0 | Status: SHIPPED | OUTPATIENT
Start: 2019-06-13 | End: 2019-07-09

## 2019-06-13 NOTE — PROGRESS NOTES
Subjective:      Elina Skaggs is a 59 y.o. female who presents with Hospital Follow-up        HPI:  The patient presents for hospital follow up of altered level of consciousness, hypotension, and acute kidney injury.     Altered level of consciousness  Hypotension, unspecified hypotension type  COLT (acute kidney injury) (HCC)  The patient was recently admitted from 6/5/19-6/8/19 after complaining of a headache with dizziness. Upon being evaluated, patient was found to have an acute kidney injury with a creatinine of 2.24. Patient was hydrated with IV fluids and given supportive care with complete resolution of the problem.  CT of the head and MRI of the brain did not show any acute abnormalities.  She was ruled out for infection.  She was then discharged and instructed to increase her fluids by mouth at home. Patient was again admitted from 6/11/19-6/12/11 after an episode of altered mental status.  Infectious cause was ruled out.  At that time, it was believed that the patient may have ingested too many of her narcotic pain medications.  She adamantly denied taking more than what was instructed.  Patient's blood pressure was very low at 83/53, and she states that she became frustrated as the hospital staff would not give her any of her blood pressure medications. Patient did not understand that these medication could cause her to become even more hypotensive. Since returning home, patient has not had any further episodes of headache, dizziness, or altered mental status.  She said she went back on all her meds upon discharge including her blood pressure medications enalapril 20 mg and amlodipine 5 mg as instructed.  Blood pressure today is running very good.       She complains today of vaginal itching and whitish vaginal discharge of few days duration.  She is asking for a cream that was given to her before for yeast infection.  She said she has not had any sexual activity for about 14 years now.    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    Past medical history, past surgical history, family history reviewed-no changes    Social history reviewed-no changes    Allergies reviewed-no changes    Medications reviewed-no changes      ROS:  As per the HPI as shown above, the rest are negative.       Objective:     /70 (BP Location: Left arm, Patient Position: Sitting, BP Cuff Size: Adult)   Pulse 90   Temp 36.9 °C (98.5 °F) (Temporal)   Ht 1.626 m (5' 4\")   Wt 61.5 kg (135 lb 9.3 oz)   SpO2 96%   BMI 23.27 kg/m²     Physical Exam  Examined alert, awake, oriented, not in distress    Neck-supple, no lymphadenopathy, no thyromegaly  Lungs-clear to auscultation, no rales, no wheezes  Heart-regular rate and rhythm, no murmur  Extremities-no edema, clubbing, cyanosis  Neuro exam-grossly intact  Pelvic exam-introitus parous, no lesions, cervix with minimal whitish thick discharge non-foul-smelling, uterus not enlarged and not tender, no cervical motion tenderness, no adnexal masses or tenderness       Labs:  Admission on 2019, Discharged on 2019   Component Date Value Ref Range Status   • Report 2019    Final                    Value:[facility]    Test Date:  2019  Pt Name:    CHAYA KRISHNA               Department: ER  MRN:        [id number]                      Room:  Gender:     Female                       Technician: [id number]  :        1960                   Requested By:ER TRIAGE PROTOCOL  Order #:    [id number]                    Reading MD: ANURAG SMALL MD    Measurements  Intervals                                Axis  Rate:       69                           P:          11  NV:         128                          QRS:        26  QRSD:       82                           T:          12  QT:         " 416  QTc:        446    Interpretive Statements  SINUS RHYTHM  BASELINE WANDER IN LEAD(S) V6  Compared to ECG 06/05/2019 21:38:26  No significant changes    Electronically Signed On 6- 14:23:00 PDT by ANURAG SMALL MD     • Lactic Acid 06/11/2019 1.3  0.5 - 2.0 mmol/L Final   • WBC 06/11/2019 4.6* 4.8 - 10.8 K/uL Final   • RBC 06/11/2019 4.13* 4.20 - 5.40 M/uL Final   • Hemoglobin 06/11/2019 12.2  12.0 - 16.0 g/dL Final   • Hematocrit 06/11/2019 38.2  37.0 - 47.0 % Final   • MCV 06/11/2019 92.5  81.4 - 97.8 fL Final   • MCH 06/11/2019 29.5  27.0 - 33.0 pg Final   • MCHC 06/11/2019 31.9* 33.6 - 35.0 g/dL Final   • RDW 06/11/2019 46.6  35.9 - 50.0 fL Final   • Platelet Count 06/11/2019 337  164 - 446 K/uL Final   • MPV 06/11/2019 9.7  9.0 - 12.9 fL Final   • Neutrophils-Polys 06/11/2019 33.60* 44.00 - 72.00 % Final   • Lymphocytes 06/11/2019 53.70* 22.00 - 41.00 % Final   • Monocytes 06/11/2019 8.20  0.00 - 13.40 % Final   • Eosinophils 06/11/2019 2.80  0.00 - 6.90 % Final   • Basophils 06/11/2019 1.30  0.00 - 1.80 % Final   • Immature Granulocytes 06/11/2019 0.40  0.00 - 0.90 % Final   • Nucleated RBC 06/11/2019 0.00  /100 WBC Final   • Neutrophils (Absolute) 06/11/2019 1.55* 2.00 - 7.15 K/uL Final   • Lymphs (Absolute) 06/11/2019 2.48  1.00 - 4.80 K/uL Final   • Monos (Absolute) 06/11/2019 0.38  0.00 - 0.85 K/uL Final   • Eos (Absolute) 06/11/2019 0.13  0.00 - 0.51 K/uL Final   • Baso (Absolute) 06/11/2019 0.06  0.00 - 0.12 K/uL Final   • Immature Granulocytes (abs) 06/11/2019 0.02  0.00 - 0.11 K/uL Final   • NRBC (Absolute) 06/11/2019 0.00  K/uL Final   • Sodium 06/11/2019 141  135 - 145 mmol/L Final   • Potassium 06/11/2019 4.4  3.6 - 5.5 mmol/L Final   • Chloride 06/11/2019 106  96 - 112 mmol/L Final   • Co2 06/11/2019 28  20 - 33 mmol/L Final   • Anion Gap 06/11/2019 7.0  0.0 - 11.9 Final   • Glucose 06/11/2019 69  65 - 99 mg/dL Final   • Bun 06/11/2019 13  8 - 22 mg/dL Final   • Creatinine  06/11/2019 1.15  0.50 - 1.40 mg/dL Final   • Calcium 06/11/2019 9.5  8.5 - 10.5 mg/dL Final   • AST(SGOT) 06/11/2019 21  12 - 45 U/L Final   • ALT(SGPT) 06/11/2019 24  2 - 50 U/L Final   • Alkaline Phosphatase 06/11/2019 85  30 - 99 U/L Final   • Total Bilirubin 06/11/2019 0.4  0.1 - 1.5 mg/dL Final   • Albumin 06/11/2019 4.0  3.2 - 4.9 g/dL Final   • Total Protein 06/11/2019 6.9  6.0 - 8.2 g/dL Final   • Globulin 06/11/2019 2.9  1.9 - 3.5 g/dL Final   • A-G Ratio 06/11/2019 1.4  g/dL Final   • Color 06/11/2019 Yellow   Final   • Character 06/11/2019 Clear   Final   • Specific Gravity 06/11/2019 1.008  <1.035 Final   • Ph 06/11/2019 7.0  5.0 - 8.0 Final   • Glucose 06/11/2019 Negative  Negative mg/dL Final   • Ketones 06/11/2019 Negative  Negative mg/dL Final   • Protein 06/11/2019 Negative  Negative mg/dL Final   • Bilirubin 06/11/2019 Negative  Negative Final   • Urobilinogen, Urine 06/11/2019 0.2  Negative Final   • Nitrite 06/11/2019 Negative  Negative Final   • Leukocyte Esterase 06/11/2019 Small* Negative Final   • Occult Blood 06/11/2019 Trace* Negative Final   • Micro Urine Req 06/11/2019 Microscopic   Final   • Significant Indicator 06/11/2019 NEG   Final   • Source 06/11/2019 UR   Final   • Site 06/11/2019 -   Final   • Culture Result 06/11/2019 Mixed skin rylan 10-50,000 cfu/mL   Final   • Significant Indicator 06/11/2019 NEG   Preliminary   • Source 06/11/2019 BLD   Preliminary   • Site 06/11/2019 PERIPHERAL   Preliminary   • Culture Result 06/11/2019    Preliminary                    Value:No Growth  Note: Blood cultures are incubated for 5 days and  are monitored continuously.Positive blood cultures  are called to the RN and reported as soon as  they are identified.     • Significant Indicator 06/11/2019 NEG   Preliminary   • Source 06/11/2019 BLD   Preliminary   • Site 06/11/2019 PERIPHERAL   Preliminary   • Culture Result 06/11/2019    Preliminary                    Value:No Growth  Note: Blood  cultures are incubated for 5 days and  are monitored continuously.Positive blood cultures  are called to the RN and reported as soon as  they are identified.     • Troponin I 06/11/2019 <0.01  0.00 - 0.04 ng/mL Final   • Diagnostic Alcohol 06/11/2019 0.00  0.00 g/dL Final   • Amphetamines Urine 06/11/2019 Negative  Negative Final   • Barbiturates 06/11/2019 Negative  Negative Final   • Benzodiazepines 06/11/2019 Positive* Negative Final   • Cocaine Metabolite 06/11/2019 Negative  Negative Final   • Methadone 06/11/2019 Negative  Negative Final   • Opiates 06/11/2019 Positive* Negative Final   • Oxycodone 06/11/2019 Positive* Negative Final   • Phencyclidine -Pcp 06/11/2019 Negative  Negative Final   • Propoxyphene 06/11/2019 Negative  Negative Final   • Cannabinoid Metab 06/11/2019 Negative  Negative Final   • GFR If  06/11/2019 58* >60 mL/min/1.73 m 2 Final   • GFR If Non  06/11/2019 48* >60 mL/min/1.73 m 2 Final   • Sed Rate Westergren 06/11/2019 26  0 - 30 mm/hour Final   • Stat C-Reactive Protein 06/11/2019 0.37  0.00 - 0.75 mg/dL Final   • WBC 06/11/2019 5-10* /hpf Final   • RBC 06/11/2019 5-10* /hpf Final   • Bacteria 06/11/2019 Negative  None /hpf Final   • Epithelial Cells 06/11/2019 Few  /hpf Final   • Hyaline Cast 06/11/2019 6-10* /lpf Final   • Procalcitonin 06/11/2019 <0.05  <0.25 ng/mL Final   • WBC 06/12/2019 5.3  4.8 - 10.8 K/uL Final   • RBC 06/12/2019 3.82* 4.20 - 5.40 M/uL Final   • Hemoglobin 06/12/2019 11.2* 12.0 - 16.0 g/dL Final   • Hematocrit 06/12/2019 35.7* 37.0 - 47.0 % Final   • MCV 06/12/2019 93.5  81.4 - 97.8 fL Final   • MCH 06/12/2019 29.3  27.0 - 33.0 pg Final   • MCHC 06/12/2019 31.4* 33.6 - 35.0 g/dL Final   • RDW 06/12/2019 46.5  35.9 - 50.0 fL Final   • Platelet Count 06/12/2019 302  164 - 446 K/uL Final   • MPV 06/12/2019 10.0  9.0 - 12.9 fL Final   • Neutrophils-Polys 06/12/2019 33.10* 44.00 - 72.00 % Final   • Lymphocytes 06/12/2019 56.90* 22.00  - 41.00 % Final   • Monocytes 2019 6.40  0.00 - 13.40 % Final   • Eosinophils 2019 2.60  0.00 - 6.90 % Final   • Basophils 2019 0.80  0.00 - 1.80 % Final   • Immature Granulocytes 2019 0.20  0.00 - 0.90 % Final   • Nucleated RBC 2019 0.00  /100 WBC Final   • Neutrophils (Absolute) 2019 1.76* 2.00 - 7.15 K/uL Final   • Lymphs (Absolute) 2019 3.02  1.00 - 4.80 K/uL Final   • Monos (Absolute) 2019 0.34  0.00 - 0.85 K/uL Final   • Eos (Absolute) 2019 0.14  0.00 - 0.51 K/uL Final   • Baso (Absolute) 2019 0.04  0.00 - 0.12 K/uL Final   • Immature Granulocytes (abs) 2019 0.01  0.00 - 0.11 K/uL Final   • NRBC (Absolute) 2019 0.00  K/uL Final   Admission on 2019, Discharged on 2019   Component Date Value Ref Range Status   • Report 2019    Final                    Value:[facility].    Test Date:  2019  Pt Name:    CHAYA KRISHNA               Department: ER  MRN:        [id number]                      Room:       [facility]  Gender:     Female                       Technician: [id number]  :        1960                   Requested By:ER TRIAGE PROTOCOL  Order #:    [id number]                    Reading MD: TAWANNA CONNOR MD    Measurements  Intervals                                Axis  Rate:       66                           P:          0  PA:         138                          QRS:        45  QRSD:       82                           T:          31  QT:         428  QTc:        449    Interpretive Statements  SINUS RHYTHM  Compared to ECG 2012 09:38:24  T-wave abnormality no longer present    Electronically Signed On 2019 1:46:30 PDT by TAWANNA CONNOR MD     • WBC 2019 7.9  4.8 - 10.8 K/uL Final   • RBC 2019 4.43  4.20 - 5.40 M/uL Final   • Hemoglobin 2019 13.1  12.0 - 16.0 g/dL Final   • Hematocrit 2019 41.5  37.0 - 47.0 % Final   • MCV 2019 93.7  81.4 -  97.8 fL Final   • MCH 06/05/2019 29.6  27.0 - 33.0 pg Final   • MCHC 06/05/2019 31.6* 33.6 - 35.0 g/dL Final   • RDW 06/05/2019 47.8  35.9 - 50.0 fL Final   • Platelet Count 06/05/2019 331  164 - 446 K/uL Final   • MPV 06/05/2019 9.9  9.0 - 12.9 fL Final   • Neutrophils-Polys 06/05/2019 38.80* 44.00 - 72.00 % Final   • Lymphocytes 06/05/2019 49.20* 22.00 - 41.00 % Final   • Monocytes 06/05/2019 9.00  0.00 - 13.40 % Final   • Eosinophils 06/05/2019 2.00  0.00 - 6.90 % Final   • Basophils 06/05/2019 0.90  0.00 - 1.80 % Final   • Immature Granulocytes 06/05/2019 0.10  0.00 - 0.90 % Final   • Nucleated RBC 06/05/2019 0.00  /100 WBC Final   • Neutrophils (Absolute) 06/05/2019 3.07  2.00 - 7.15 K/uL Final   • Lymphs (Absolute) 06/05/2019 3.90  1.00 - 4.80 K/uL Final   • Monos (Absolute) 06/05/2019 0.71  0.00 - 0.85 K/uL Final   • Eos (Absolute) 06/05/2019 0.16  0.00 - 0.51 K/uL Final   • Baso (Absolute) 06/05/2019 0.07  0.00 - 0.12 K/uL Final   • Immature Granulocytes (abs) 06/05/2019 0.01  0.00 - 0.11 K/uL Final   • NRBC (Absolute) 06/05/2019 0.00  K/uL Final   • Sodium 06/05/2019 139  135 - 145 mmol/L Final   • Potassium 06/05/2019 4.2  3.6 - 5.5 mmol/L Final   • Chloride 06/05/2019 103  96 - 112 mmol/L Final   • Co2 06/05/2019 29  20 - 33 mmol/L Final   • Anion Gap 06/05/2019 7.0  0.0 - 11.9 Final   • Glucose 06/05/2019 88  65 - 99 mg/dL Final   • Bun 06/05/2019 16  8 - 22 mg/dL Final   • Creatinine 06/05/2019 2.24* 0.50 - 1.40 mg/dL Final   • Calcium 06/05/2019 9.4  8.5 - 10.5 mg/dL Final   • AST(SGOT) 06/05/2019 16  12 - 45 U/L Final   • ALT(SGPT) 06/05/2019 15  2 - 50 U/L Final   • Alkaline Phosphatase 06/05/2019 80  30 - 99 U/L Final   • Total Bilirubin 06/05/2019 0.4  0.1 - 1.5 mg/dL Final   • Albumin 06/05/2019 4.3  3.2 - 4.9 g/dL Final   • Total Protein 06/05/2019 7.4  6.0 - 8.2 g/dL Final   • Globulin 06/05/2019 3.1  1.9 - 3.5 g/dL Final   • A-G Ratio 06/05/2019 1.4  g/dL Final   • GFR If   06/05/2019 27* >60 mL/min/1.73 m 2 Final   • GFR If Non  06/05/2019 22* >60 mL/min/1.73 m 2 Final   • Troponin I 06/05/2019 <0.01  0.00 - 0.04 ng/mL Final   • Significant Indicator 06/05/2019 NEG   Final   • Source 06/05/2019 BLD   Final   • Site 06/05/2019 PERIPHERAL   Final   • Culture Result 06/05/2019 No growth after 5 days of incubation.   Final   • Significant Indicator 06/05/2019 NEG   Final   • Source 06/05/2019 BLD   Final   • Site 06/05/2019 PERIPHERAL   Final   • Culture Result 06/05/2019 No growth after 5 days of incubation.   Final   • Lactic Acid 06/05/2019 1.0  0.5 - 2.0 mmol/L Final   • Sodium, Urine -per volume 06/06/2019 22  mmol/L Final   • Creatinine, Random Urine 06/06/2019 144.00  mg/dL Final   • Color 06/06/2019 Yellow   Final   • Character 06/06/2019 Cloudy*  Final   • Specific Gravity 06/06/2019 1.012  <1.035 Final   • Ph 06/06/2019 5.5  5.0 - 8.0 Final   • Glucose 06/06/2019 Negative  Negative mg/dL Final   • Ketones 06/06/2019 Negative  Negative mg/dL Final   • Protein 06/06/2019 Negative  Negative mg/dL Final   • Bilirubin 06/06/2019 Negative  Negative Final   • Urobilinogen, Urine 06/06/2019 0.2  Negative Final   • Nitrite 06/06/2019 Negative  Negative Final   • Leukocyte Esterase 06/06/2019 Small* Negative Final   • Occult Blood 06/06/2019 Trace* Negative Final   • Micro Urine Req 06/06/2019 Microscopic   Final   • Creatinine, Urine 06/06/2019 145.80  mg/dL Final   • Microalbumin, Urine Random 06/06/2019 3.0  mg/dL Final   • Micro Alb Creat Ratio 06/06/2019 21  0 - 30 mg/g Final   • WBC 06/06/2019 10-20* /hpf Final   • RBC 06/06/2019 5-10* /hpf Final   • Bacteria 06/06/2019 Few* None /hpf Final   • Epithelial Cells 06/06/2019 Few  /hpf Final   • Hyaline Cast 06/06/2019 0-2  /lpf Final   • Significant Indicator 06/06/2019 NEG   Final   • Source 06/06/2019 UR   Final   • Site 06/06/2019 -   Final   • Culture Result 06/06/2019 Mixed skin rylan 10-50,000 cfu/mL   Final   •  Sodium 06/06/2019 143  135 - 145 mmol/L Final   • Potassium 06/06/2019 3.5* 3.6 - 5.5 mmol/L Final   • Chloride 06/06/2019 109  96 - 112 mmol/L Final   • Co2 06/06/2019 26  20 - 33 mmol/L Final   • Glucose 06/06/2019 120* 65 - 99 mg/dL Final   • Bun 06/06/2019 12  8 - 22 mg/dL Final   • Creatinine 06/06/2019 0.76  0.50 - 1.40 mg/dL Final   • Calcium 06/06/2019 8.3* 8.5 - 10.5 mg/dL Final   • Anion Gap 06/06/2019 8.0  0.0 - 11.9 Final   • GFR If  06/06/2019 >60  >60 mL/min/1.73 m 2 Final   • GFR If Non  06/06/2019 >60  >60 mL/min/1.73 m 2 Final   • WBC 06/07/2019 5.5  4.8 - 10.8 K/uL Final   • RBC 06/07/2019 3.63* 4.20 - 5.40 M/uL Final   • Hemoglobin 06/07/2019 10.8* 12.0 - 16.0 g/dL Final   • Hematocrit 06/07/2019 34.4* 37.0 - 47.0 % Final   • MCV 06/07/2019 94.8  81.4 - 97.8 fL Final   • MCH 06/07/2019 29.8  27.0 - 33.0 pg Final   • MCHC 06/07/2019 31.4* 33.6 - 35.0 g/dL Final   • RDW 06/07/2019 47.4  35.9 - 50.0 fL Final   • Platelet Count 06/07/2019 237  164 - 446 K/uL Final   • MPV 06/07/2019 9.8  9.0 - 12.9 fL Final   • Neutrophils-Polys 06/07/2019 31.60* 44.00 - 72.00 % Final   • Lymphocytes 06/07/2019 59.20* 22.00 - 41.00 % Final   • Monocytes 06/07/2019 6.70  0.00 - 13.40 % Final   • Eosinophils 06/07/2019 1.80  0.00 - 6.90 % Final   • Basophils 06/07/2019 0.70  0.00 - 1.80 % Final   • Immature Granulocytes 06/07/2019 0.00  0.00 - 0.90 % Final   • Nucleated RBC 06/07/2019 0.00  /100 WBC Final   • Neutrophils (Absolute) 06/07/2019 1.75* 2.00 - 7.15 K/uL Final   • Lymphs (Absolute) 06/07/2019 3.28  1.00 - 4.80 K/uL Final   • Monos (Absolute) 06/07/2019 0.37  0.00 - 0.85 K/uL Final   • Eos (Absolute) 06/07/2019 0.10  0.00 - 0.51 K/uL Final   • Baso (Absolute) 06/07/2019 0.04  0.00 - 0.12 K/uL Final   • Immature Granulocytes (abs) 06/07/2019 0.00  0.00 - 0.11 K/uL Final   • NRBC (Absolute) 06/07/2019 0.00  K/uL Final   • Sodium 06/07/2019 141  135 - 145 mmol/L Final   • Potassium  06/07/2019 4.3  3.6 - 5.5 mmol/L Final   • Chloride 06/07/2019 112  96 - 112 mmol/L Final   • Co2 06/07/2019 25  20 - 33 mmol/L Final   • Glucose 06/07/2019 105* 65 - 99 mg/dL Final   • Bun 06/07/2019 9  8 - 22 mg/dL Final   • Creatinine 06/07/2019 0.56  0.50 - 1.40 mg/dL Final   • Calcium 06/07/2019 8.1* 8.5 - 10.5 mg/dL Final   • Anion Gap 06/07/2019 4.0  0.0 - 11.9 Final   • GFR If  06/07/2019 >60  >60 mL/min/1.73 m 2 Final   • GFR If Non  06/07/2019 >60  >60 mL/min/1.73 m 2 Final      Assessment/Plan:     1. Altered level of consciousness  This is an acute problem for the patient thought to be secondary to an accidental narcotic pain medication overdose. Since returning home, patient has not had any further episodes of altered mental status.  She however denied taking more than what was instructed.  She understands that she is to only take her narcotic pain medications as directed.  Narcotic pain medications are being prescribed by her pain specialist.    2. Hypotension, unspecified hypotension type  Patient was found to be hypotensive during her hospital stay from 6/11/19 to 6/12/19. Her blood pressure at that time was 83/53. Since being discharged, patient's blood pressure has remained within normal limits. She continues to take her enalapril 20 mg as well as amlodipine 5 mg. Her blood pressure is 124/70 today.  She can continue taking her blood pressure medications.    3. COLT (acute kidney injury) (HCC)  Patient was recently admitted from 6/5/19-6/8/19 and was found to have an acute kidney injury. Since being discharged, patient's labs indicate normal kidney function. She understands that she must maintain adequate fluid hydration in order to prevent further kidney injury. Advised patient to avoid over ingestion of nephrotoxic agents as well.     4.  Yeast vaginitis  History and physical exam consistent with yeast vaginitis.  I have sent cultures for GC and chlamydia and  vaginal pathogens DNA panel.  I will treat empirically for yeast vaginitis with Diflucan 150 mg x 1 dose.  I will communicate results with her and we will give additional treatment depending on results.    Patient was seen for 40 minutes face to face of which > 50% of appointment time was spent on counseling and coordination of care regarding the above.    Edmar LEVI (Jil), am scribing for, and in the presence of, Nancy Hunt MD     Electronically signed by: Edmar Love (Jil), 6/13/2019    INancy MD personally performed the services described in this documentation, as scribed by Edmar Love in my presence, and it is both accurate and complete.

## 2019-06-14 LAB
C TRACH DNA SPEC QL NAA+PROBE: NEGATIVE
CANDIDA DNA VAG QL PROBE+SIG AMP: POSITIVE
G VAGINALIS DNA VAG QL PROBE+SIG AMP: POSITIVE
N GONORRHOEA DNA SPEC QL NAA+PROBE: NEGATIVE
SPECIMEN SOURCE: NORMAL
T VAGINALIS DNA VAG QL PROBE+SIG AMP: NEGATIVE

## 2019-06-16 DIAGNOSIS — B96.89 BACTERIAL VAGINOSIS: ICD-10-CM

## 2019-06-16 DIAGNOSIS — N76.0 BACTERIAL VAGINOSIS: ICD-10-CM

## 2019-06-16 LAB
BACTERIA BLD CULT: NORMAL
BACTERIA BLD CULT: NORMAL
SIGNIFICANT IND 70042: NORMAL
SIGNIFICANT IND 70042: NORMAL
SITE SITE: NORMAL
SITE SITE: NORMAL
SOURCE SOURCE: NORMAL
SOURCE SOURCE: NORMAL

## 2019-06-16 RX ORDER — METRONIDAZOLE 500 MG/1
500 TABLET ORAL 2 TIMES DAILY
Qty: 14 TAB | Refills: 0 | Status: SHIPPED | OUTPATIENT
Start: 2019-06-16 | End: 2019-07-09

## 2019-06-17 ENCOUNTER — TELEPHONE (OUTPATIENT)
Dept: URGENT CARE | Facility: PHYSICIAN GROUP | Age: 59
End: 2019-06-17

## 2019-06-17 ENCOUNTER — TELEPHONE (OUTPATIENT)
Dept: MEDICAL GROUP | Facility: PHYSICIAN GROUP | Age: 59
End: 2019-06-17

## 2019-06-17 ENCOUNTER — HOSPITAL ENCOUNTER (OUTPATIENT)
Dept: LAB | Facility: MEDICAL CENTER | Age: 59
End: 2019-06-17
Attending: FAMILY MEDICINE
Payer: COMMERCIAL

## 2019-06-17 DIAGNOSIS — E78.5 DYSLIPIDEMIA: ICD-10-CM

## 2019-06-17 DIAGNOSIS — E55.9 VITAMIN D DEFICIENCY: ICD-10-CM

## 2019-06-17 DIAGNOSIS — I10 ESSENTIAL HYPERTENSION: ICD-10-CM

## 2019-06-17 LAB
ALBUMIN SERPL BCP-MCNC: 3.7 G/DL (ref 3.2–4.9)
ALBUMIN/GLOB SERPL: 1.4 G/DL
ALP SERPL-CCNC: 72 U/L (ref 30–99)
ALT SERPL-CCNC: 15 U/L (ref 2–50)
ANION GAP SERPL CALC-SCNC: 5 MMOL/L (ref 0–11.9)
AST SERPL-CCNC: 16 U/L (ref 12–45)
BASOPHILS # BLD AUTO: 1.3 % (ref 0–1.8)
BASOPHILS # BLD: 0.06 K/UL (ref 0–0.12)
BILIRUB SERPL-MCNC: 0.5 MG/DL (ref 0.1–1.5)
BUN SERPL-MCNC: 15 MG/DL (ref 8–22)
CALCIUM SERPL-MCNC: 8.9 MG/DL (ref 8.5–10.5)
CHLORIDE SERPL-SCNC: 105 MMOL/L (ref 96–112)
CHOLEST SERPL-MCNC: 204 MG/DL (ref 100–199)
CO2 SERPL-SCNC: 29 MMOL/L (ref 20–33)
CREAT SERPL-MCNC: 0.77 MG/DL (ref 0.5–1.4)
EOSINOPHIL # BLD AUTO: 0.06 K/UL (ref 0–0.51)
EOSINOPHIL NFR BLD: 1.3 % (ref 0–6.9)
ERYTHROCYTE [DISTWIDTH] IN BLOOD BY AUTOMATED COUNT: 47.2 FL (ref 35.9–50)
FASTING STATUS PATIENT QL REPORTED: NORMAL
GLOBULIN SER CALC-MCNC: 2.6 G/DL (ref 1.9–3.5)
GLUCOSE SERPL-MCNC: 119 MG/DL (ref 65–99)
HCT VFR BLD AUTO: 35.8 % (ref 37–47)
HDLC SERPL-MCNC: 47 MG/DL
HGB BLD-MCNC: 11.4 G/DL (ref 12–16)
IMM GRANULOCYTES # BLD AUTO: 0.01 K/UL (ref 0–0.11)
IMM GRANULOCYTES NFR BLD AUTO: 0.2 % (ref 0–0.9)
LDLC SERPL CALC-MCNC: 139 MG/DL
LYMPHOCYTES # BLD AUTO: 2.34 K/UL (ref 1–4.8)
LYMPHOCYTES NFR BLD: 50.2 % (ref 22–41)
MCH RBC QN AUTO: 30 PG (ref 27–33)
MCHC RBC AUTO-ENTMCNC: 31.8 G/DL (ref 33.6–35)
MCV RBC AUTO: 94.2 FL (ref 81.4–97.8)
MONOCYTES # BLD AUTO: 0.41 K/UL (ref 0–0.85)
MONOCYTES NFR BLD AUTO: 8.8 % (ref 0–13.4)
NEUTROPHILS # BLD AUTO: 1.78 K/UL (ref 2–7.15)
NEUTROPHILS NFR BLD: 38.2 % (ref 44–72)
NRBC # BLD AUTO: 0 K/UL
NRBC BLD-RTO: 0 /100 WBC
PLATELET # BLD AUTO: 299 K/UL (ref 164–446)
PMV BLD AUTO: 10.6 FL (ref 9–12.9)
POTASSIUM SERPL-SCNC: 4.1 MMOL/L (ref 3.6–5.5)
PROT SERPL-MCNC: 6.3 G/DL (ref 6–8.2)
RBC # BLD AUTO: 3.8 M/UL (ref 4.2–5.4)
SODIUM SERPL-SCNC: 139 MMOL/L (ref 135–145)
TRIGL SERPL-MCNC: 89 MG/DL (ref 0–149)
WBC # BLD AUTO: 4.7 K/UL (ref 4.8–10.8)

## 2019-06-17 PROCEDURE — 80053 COMPREHEN METABOLIC PANEL: CPT

## 2019-06-17 PROCEDURE — 80061 LIPID PANEL: CPT

## 2019-06-17 PROCEDURE — 82306 VITAMIN D 25 HYDROXY: CPT

## 2019-06-17 PROCEDURE — 36415 COLL VENOUS BLD VENIPUNCTURE: CPT

## 2019-06-17 PROCEDURE — 85025 COMPLETE CBC W/AUTO DIFF WBC: CPT

## 2019-06-17 NOTE — TELEPHONE ENCOUNTER
----- Message from Nancy Hunt M.D. sent at 6/16/2019  3:26 PM PDT -----  Please inform patient the vaginal cultures came back negative for chlamydia and gonorrhea.  She however has positive yeast infection.  I already gave her Diflucan to treat the infection and that should be sufficient.  She also has bacterial vaginosis which is not sexually transmitted infection.  She will need another antibiotic for this infection.  I sent the prescription to her pharmacy for metronidazole 500 mg 1 tablet twice a day for 7 days.

## 2019-06-17 NOTE — TELEPHONE ENCOUNTER
Phone Number Called: [phone] (home)       Call outcome: left message for patient to call back regarding message below    Message: Mail box full will call again later. LM

## 2019-06-18 ENCOUNTER — TELEPHONE (OUTPATIENT)
Dept: MEDICAL GROUP | Facility: PHYSICIAN GROUP | Age: 59
End: 2019-06-18

## 2019-06-18 ENCOUNTER — HOSPITAL ENCOUNTER (OUTPATIENT)
Dept: RADIOLOGY | Facility: MEDICAL CENTER | Age: 59
End: 2019-06-18
Attending: FAMILY MEDICINE
Payer: COMMERCIAL

## 2019-06-18 DIAGNOSIS — Z12.39 SCREENING BREAST EXAMINATION: ICD-10-CM

## 2019-06-18 DIAGNOSIS — E78.5 DYSLIPIDEMIA: ICD-10-CM

## 2019-06-18 DIAGNOSIS — E55.9 VITAMIN D DEFICIENCY: ICD-10-CM

## 2019-06-18 LAB — 25(OH)D3 SERPL-MCNC: 18 NG/ML (ref 30–100)

## 2019-06-18 PROCEDURE — 76641 ULTRASOUND BREAST COMPLETE: CPT

## 2019-06-18 RX ORDER — ERGOCALCIFEROL 1.25 MG/1
50000 CAPSULE ORAL
Qty: 12 CAP | Refills: 0 | Status: SHIPPED | OUTPATIENT
Start: 2019-06-18 | End: 2019-11-06

## 2019-06-18 RX ORDER — ATORVASTATIN CALCIUM 20 MG/1
20 TABLET, FILM COATED ORAL EVERY EVENING
Qty: 90 TAB | Refills: 1 | Status: SHIPPED | OUTPATIENT
Start: 2019-06-18 | End: 2019-08-12 | Stop reason: SDUPTHER

## 2019-06-18 NOTE — TELEPHONE ENCOUNTER
----- Message from Nancy Hunt M.D. sent at 6/18/2019  6:54 AM PDT -----  Vitamin D very low at 18.  We will put her on high-dose vitamin D treatment 50,000 international units once a week for the next 3 months.  Prescription sent to pharmacy.  Cholesterol panel came back more elevated than a year ago.  Total cholesterol 204 and needs to be below 200.  LDL or bad cholesterol 139 and needs to be below 100.  Her 10-year risk of having heart disease based on her cholesterol numbers and other risk factors is 10.4%.  We treat with cholesterol medication if 7.5% or higher therefore I recommend we start her on cholesterol medication.  She will take the medication 1 tablet at night regularly.  Prescription sent to pharmacy.  Borderline elevation of blood sugar 119.  This needs to be below 100.  Avoid sweets and decrease the carbs so this will not become full-blown diabetes.  Liver and kidney functions are normal.  Very mild anemia but this could be from her recent hospitalization.  We will just recheck next visit.

## 2019-06-18 NOTE — TELEPHONE ENCOUNTER
Phone Number Called: [phone] (home)     Call outcome: left message for patient to call back regarding message below    Message: LVM to call back.

## 2019-06-19 ENCOUNTER — TELEPHONE (OUTPATIENT)
Dept: MEDICAL GROUP | Facility: PHYSICIAN GROUP | Age: 59
End: 2019-06-19

## 2019-06-19 NOTE — TELEPHONE ENCOUNTER
Phone Number Called: [phone] (home)      Call outcome: left message for patient to call back regarding message below     Message: LVM to call back.

## 2019-06-20 ENCOUNTER — TELEPHONE (OUTPATIENT)
Dept: MEDICAL GROUP | Facility: PHYSICIAN GROUP | Age: 59
End: 2019-06-20

## 2019-06-20 NOTE — TELEPHONE ENCOUNTER
Patient called and wanted to know who appointment was with tomorrow.  It must be with a provider outside the [facility] system as we do not have access to who it is with

## 2019-07-08 ENCOUNTER — APPOINTMENT (OUTPATIENT)
Dept: MEDICAL GROUP | Facility: PHYSICIAN GROUP | Age: 59
End: 2019-07-08
Payer: OTHER GOVERNMENT

## 2019-07-09 ENCOUNTER — HOSPITAL ENCOUNTER (OUTPATIENT)
Dept: RADIOLOGY | Facility: MEDICAL CENTER | Age: 59
End: 2019-07-09
Attending: FAMILY MEDICINE
Payer: COMMERCIAL

## 2019-07-09 ENCOUNTER — OFFICE VISIT (OUTPATIENT)
Dept: MEDICAL GROUP | Facility: PHYSICIAN GROUP | Age: 59
End: 2019-07-09
Payer: COMMERCIAL

## 2019-07-09 ENCOUNTER — HOSPITAL ENCOUNTER (OUTPATIENT)
Dept: LAB | Facility: MEDICAL CENTER | Age: 59
End: 2019-07-09
Attending: FAMILY MEDICINE
Payer: COMMERCIAL

## 2019-07-09 VITALS
HEART RATE: 94 BPM | DIASTOLIC BLOOD PRESSURE: 60 MMHG | WEIGHT: 137.35 LBS | SYSTOLIC BLOOD PRESSURE: 100 MMHG | BODY MASS INDEX: 23.45 KG/M2 | HEIGHT: 64 IN | OXYGEN SATURATION: 91 % | TEMPERATURE: 98.3 F

## 2019-07-09 DIAGNOSIS — E78.5 DYSLIPIDEMIA: ICD-10-CM

## 2019-07-09 DIAGNOSIS — R73.01 IMPAIRED FASTING BLOOD SUGAR: ICD-10-CM

## 2019-07-09 DIAGNOSIS — E55.9 VITAMIN D DEFICIENCY: ICD-10-CM

## 2019-07-09 DIAGNOSIS — I10 ESSENTIAL HYPERTENSION: ICD-10-CM

## 2019-07-09 DIAGNOSIS — I67.1 INTRACEREBRAL ANEURYSM: ICD-10-CM

## 2019-07-09 DIAGNOSIS — J98.01 ACUTE BRONCHOSPASM: ICD-10-CM

## 2019-07-09 DIAGNOSIS — J20.9 ACUTE BRONCHITIS, UNSPECIFIED ORGANISM: ICD-10-CM

## 2019-07-09 DIAGNOSIS — F41.9 ANXIETY AND DEPRESSION: ICD-10-CM

## 2019-07-09 DIAGNOSIS — F32.A ANXIETY AND DEPRESSION: ICD-10-CM

## 2019-07-09 LAB
25(OH)D3 SERPL-MCNC: 19 NG/ML (ref 30–100)
ALBUMIN SERPL BCP-MCNC: 4.1 G/DL (ref 3.2–4.9)
ALBUMIN/GLOB SERPL: 1.4 G/DL
ALP SERPL-CCNC: 84 U/L (ref 30–99)
ALT SERPL-CCNC: 20 U/L (ref 2–50)
ANION GAP SERPL CALC-SCNC: 9 MMOL/L (ref 0–11.9)
AST SERPL-CCNC: 17 U/L (ref 12–45)
BILIRUB SERPL-MCNC: 0.3 MG/DL (ref 0.1–1.5)
BUN SERPL-MCNC: 9 MG/DL (ref 8–22)
CALCIUM SERPL-MCNC: 9.5 MG/DL (ref 8.5–10.5)
CHLORIDE SERPL-SCNC: 103 MMOL/L (ref 96–112)
CHOLEST SERPL-MCNC: 171 MG/DL (ref 100–199)
CO2 SERPL-SCNC: 27 MMOL/L (ref 20–33)
CREAT SERPL-MCNC: 0.62 MG/DL (ref 0.5–1.4)
EST. AVERAGE GLUCOSE BLD GHB EST-MCNC: 123 MG/DL
FASTING STATUS PATIENT QL REPORTED: NORMAL
GLOBULIN SER CALC-MCNC: 3 G/DL (ref 1.9–3.5)
GLUCOSE SERPL-MCNC: 118 MG/DL (ref 65–99)
HBA1C MFR BLD: 5.9 % (ref 0–5.6)
HDLC SERPL-MCNC: 47 MG/DL
LDLC SERPL CALC-MCNC: 106 MG/DL
POTASSIUM SERPL-SCNC: 3.7 MMOL/L (ref 3.6–5.5)
PROT SERPL-MCNC: 7.1 G/DL (ref 6–8.2)
SODIUM SERPL-SCNC: 139 MMOL/L (ref 135–145)
TRIGL SERPL-MCNC: 92 MG/DL (ref 0–149)

## 2019-07-09 PROCEDURE — 80053 COMPREHEN METABOLIC PANEL: CPT

## 2019-07-09 PROCEDURE — 83036 HEMOGLOBIN GLYCOSYLATED A1C: CPT

## 2019-07-09 PROCEDURE — 36415 COLL VENOUS BLD VENIPUNCTURE: CPT

## 2019-07-09 PROCEDURE — 80061 LIPID PANEL: CPT

## 2019-07-09 PROCEDURE — 99214 OFFICE O/P EST MOD 30 MIN: CPT | Performed by: FAMILY MEDICINE

## 2019-07-09 PROCEDURE — 71046 X-RAY EXAM CHEST 2 VIEWS: CPT

## 2019-07-09 PROCEDURE — 82306 VITAMIN D 25 HYDROXY: CPT

## 2019-07-09 RX ORDER — ALBUTEROL SULFATE 90 UG/1
2 AEROSOL, METERED RESPIRATORY (INHALATION) EVERY 6 HOURS PRN
Qty: 8.5 G | Refills: 1 | Status: SHIPPED | OUTPATIENT
Start: 2019-07-09 | End: 2019-12-05 | Stop reason: SDUPTHER

## 2019-07-09 RX ORDER — DOXYCYCLINE HYCLATE 100 MG
100 TABLET ORAL 2 TIMES DAILY
Qty: 14 TAB | Refills: 0 | Status: SHIPPED | OUTPATIENT
Start: 2019-07-09 | End: 2019-08-21

## 2019-07-09 NOTE — PROGRESS NOTES
Subjective:      Elina Skaggs is a 59 y.o. female who presents with Headache        HPI:  Patient is here for follow up of her chronic medical problems as well as for other concerns.     Essential hypertension  Patient continues to take Enalapril 20 mg daily for her hypertension. She denies any side effects from the medications. Her blood pressure is within normal limits today at 100/60.     Dyslipidemia  Patient was started on Atorvastatin 20 mg daily in June 2019. She has tolerated the medication well and denies any myalgias. Her total cholesterol is elevated at 204 and her LDL is elevated at 139. Both her triglycerides and HDL are within normal limits.  10-year ASCVD risk was 10.4%.    Intracerebral aneurysm  The patient was diagnosed with an intracerebral aneurysm in April 2019 after MRA of the brain was done work-up for new headache. She has been evaluated by the neurosurgeon and was subsequently referred to interventional radiologist to see if she may undergo transcutaneous repair or coiling. Patient adds that recently she has been experiencing worsening headaches and neck spasms and wonders if this could be related to the aneurysm.      Impaired fasting blood sugar  This is a new problem for the patient. Her fasting glucose is 119 on recent labs. This is not in diabetic range, but is consistent with prediabetes. She mentions that she tends to eat a lot of carbohydrates as well as candy each night.     Vitamin D deficiency  We put her on high-dose vitamin D treatment 50,000 international units once a week in June because her vitamin D level continued to stay low at 18.  She has been taking the vitamin D treatment.    Anxiety and depression  Patient has been experiencing worsening symptoms of anxiety and depression related to her current divorce with her . She states that she is being sued by her  and is constantly worried how she will pay for her rent as he was the financial provider in their  "marriage. She continues to meet with psychiatry and takes sertraline and diazepam as prescribed.     Acute bronchitis, unspecified organism  Acute bronchospasm  This is a new complaint today. The patient states that she has been experiencing 6 days of cough and head congestion. She states that her symptoms began on the 4th of July after walking out into the windy air while her hair was still wet. Her cough has been productive with white phlegm.  Patient adds that she also experiences sinus pressure and neck pressure. She otherwise denies any fever or chills. Patient has an inhaler at home, but is in need of a refill. She adds that she continues to smoke cigarettes despite being prescribed Chantix.     Additionally, the patient will be completing her colonoscopy on July 18th.         Past medical history, past surgical history, family history reviewed-no changes    Social history reviewed-no changes    Allergies reviewed-no changes    Medications reviewed-no changes      ROS:  As per the HPI as shown above, the rest are negative.       Objective:     /60 (BP Location: Left arm, Patient Position: Sitting, BP Cuff Size: Adult)   Pulse 94   Temp 36.8 °C (98.3 °F) (Temporal)   Ht 1.626 m (5' 4\")   Wt 62.3 kg (137 lb 5.6 oz)   SpO2 91%   BMI 23.58 kg/m²     Physical Exam  Examined alert, awake, oriented, not in distress    Ears-tympanic membranes intact bilaterally with no evidence of infection  Nose- clear nasal discharge, turbinates not enlarged, no obstruction, nontender frontal and maxillary sinuses  Throat-no erythema, tonsils not enlarged, no exudates  Neck-supple, no lymphadenopathy, no thyromegaly  Lungs-rhonchi all over lung fields, scattered expiratory wheezes, no rales, good air exchange  Heart-regular rate and rhythm, no murmur  Extremities-no edema, clubbing, cyanosis  Psych-tearful, appears anxious, tends to get scattered and out of focus and has to be redirected       Labs:  Hospital Outpatient " Visit on 06/17/2019   Component Date Value Ref Range Status   • Cholesterol,Tot 06/17/2019 204* 100 - 199 mg/dL Final   • Triglycerides 06/17/2019 89  0 - 149 mg/dL Final   • HDL 06/17/2019 47  >=40 mg/dL Final   • LDL 06/17/2019 139* <100 mg/dL Final   • Sodium 06/17/2019 139  135 - 145 mmol/L Final   • Potassium 06/17/2019 4.1  3.6 - 5.5 mmol/L Final   • Chloride 06/17/2019 105  96 - 112 mmol/L Final   • Co2 06/17/2019 29  20 - 33 mmol/L Final   • Anion Gap 06/17/2019 5.0  0.0 - 11.9 Final   • Glucose 06/17/2019 119* 65 - 99 mg/dL Final   • Bun 06/17/2019 15  8 - 22 mg/dL Final   • Creatinine 06/17/2019 0.77  0.50 - 1.40 mg/dL Final   • Calcium 06/17/2019 8.9  8.5 - 10.5 mg/dL Final   • AST(SGOT) 06/17/2019 16  12 - 45 U/L Final   • ALT(SGPT) 06/17/2019 15  2 - 50 U/L Final   • Alkaline Phosphatase 06/17/2019 72  30 - 99 U/L Final   • Total Bilirubin 06/17/2019 0.5  0.1 - 1.5 mg/dL Final   • Albumin 06/17/2019 3.7  3.2 - 4.9 g/dL Final   • Total Protein 06/17/2019 6.3  6.0 - 8.2 g/dL Final   • Globulin 06/17/2019 2.6  1.9 - 3.5 g/dL Final   • A-G Ratio 06/17/2019 1.4  g/dL Final   • WBC 06/17/2019 4.7* 4.8 - 10.8 K/uL Final   • RBC 06/17/2019 3.80* 4.20 - 5.40 M/uL Final   • Hemoglobin 06/17/2019 11.4* 12.0 - 16.0 g/dL Final   • Hematocrit 06/17/2019 35.8* 37.0 - 47.0 % Final   • MCV 06/17/2019 94.2  81.4 - 97.8 fL Final   • MCH 06/17/2019 30.0  27.0 - 33.0 pg Final   • MCHC 06/17/2019 31.8* 33.6 - 35.0 g/dL Final   • RDW 06/17/2019 47.2  35.9 - 50.0 fL Final   • Platelet Count 06/17/2019 299  164 - 446 K/uL Final   • MPV 06/17/2019 10.6  9.0 - 12.9 fL Final   • Neutrophils-Polys 06/17/2019 38.20* 44.00 - 72.00 % Final   • Lymphocytes 06/17/2019 50.20* 22.00 - 41.00 % Final   • Monocytes 06/17/2019 8.80  0.00 - 13.40 % Final   • Eosinophils 06/17/2019 1.30  0.00 - 6.90 % Final   • Basophils 06/17/2019 1.30  0.00 - 1.80 % Final   • Immature Granulocytes 06/17/2019 0.20  0.00 - 0.90 % Final   • Nucleated RBC  06/17/2019 0.00  /100 WBC Final   • Neutrophils (Absolute) 06/17/2019 1.78* 2.00 - 7.15 K/uL Final   • Lymphs (Absolute) 06/17/2019 2.34  1.00 - 4.80 K/uL Final   • Monos (Absolute) 06/17/2019 0.41  0.00 - 0.85 K/uL Final   • Eos (Absolute) 06/17/2019 0.06  0.00 - 0.51 K/uL Final   • Baso (Absolute) 06/17/2019 0.06  0.00 - 0.12 K/uL Final   • Immature Granulocytes (abs) 06/17/2019 0.01  0.00 - 0.11 K/uL Final   • NRBC (Absolute) 06/17/2019 0.00  K/uL Final   • 25-Hydroxy   Vitamin D 25 06/17/2019 18* 30 - 100 ng/mL Final   • Fasting Status 06/17/2019 Fasting   Final   • GFR If  06/17/2019 >60  >60 mL/min/1.73 m 2 Final   • GFR If Non  06/17/2019 >60  >60 mL/min/1.73 m 2 Final        Assessment/Plan:     1. Essential hypertension  Well-controlled on current medication regimen. Continue taking medications as prescribed. Discussed maintaining a healthy diet with routine exercise. Patient is to avoid excess salt in her diet. Follow up labs ordered.   - Lipid Profile; Future  - Comp Metabolic Panel; Future  - HEMOGLOBIN A1C; Future  - VITAMIN D,25 HYDROXY; Future    2. Dyslipidemia  Patient is to continue taking atorvastatin 20 mg daily as prescribed without side effects.  We will do follow-up lipid panel in 3 months to see if there is improvement with taking cholesterol medication.  - Lipid Profile; Future  - Comp Metabolic Panel; Future  - HEMOGLOBIN A1C; Future  - VITAMIN D,25 HYDROXY; Future    3. Impaired fasting blood sugar  This is a new problem today. Patient's blood glucose is borderline at 119. We have discussed the necessity to limit her carbohydrate intake and to reduce her consumption of sweets. We have discussed the plate method today. Encouraged patient to maintain daily exercise as well. Follow up labs ordered. We will continue to monitor.   - Lipid Profile; Future  - Comp Metabolic Panel; Future  - HEMOGLOBIN A1C; Future  - VITAMIN D,25 HYDROXY; Future    4. Intracerebral  aneurysm  This was diagnosed in April 2019 with an MRA as work-up for her new onset headache.  She has been seen and evaluated by neurosurgery who subsequently referred her to interventional radiologist for consideration for transcutaneous repair/coiling.  She is waiting for the phone call to set up an appointment.    5. Vitamin D deficiency  Vitamin D is low at 18 on recent labs. Encouraged patient to continue taking her prescribed supplement once weekly.  We will do follow-up vitamin D level in 3 months.  - Lipid Profile; Future  - Comp Metabolic Panel; Future  - HEMOGLOBIN A1C; Future  - VITAMIN D,25 HYDROXY; Future    6. Anxiety and depression  This is chronic and worsening related to her pending divorce with her . She continues to meet with psychiatry. Advised patient to continue taking her medications as prescribed. She denies any SI at this time.     7. Acute bronchitis, unspecified organism  This is an acute problem today. Patient experiences 6 days of head congestion as well as productive cough. She does exhibit rhonchi and wheezes on her lung exam today. There is concern for pneumonia at this time. Patient will be prescribed doxycycline and has been instructed to complete outpatient chest x-ray as soon as possible. Albuterol inhaler has been given 2 puffs every 4-6 hours as needed. Strict ED precautions given for any new or worsening symptoms such as fever or shortness of breath.  Advised increase p.o. fluids and rest.  - DX-CHEST-2 VIEWS; Future  - doxycycline (VIBRAMYCIN) 100 MG Tab; Take 1 Tab by mouth 2 times a day.  Dispense: 14 Tab; Refill: 0  - albuterol 108 (90 Base) MCG/ACT Aero Soln inhalation aerosol; Inhale 2 Puffs by mouth every 6 hours as needed for Shortness of Breath.  Dispense: 8.5 g; Refill: 1    8.  Acute bronchospasm  Plan the same as #7.    Edmar LEVI (Scribe), am scribing for, and in the presence of, Nancy Hunt MD     Electronically signed by: Edmar Love  (Scribe), 7/9/2019    Nancy ELVI MD personally performed the services described in this documentation, as scribed by Edmar Love in my presence, and it is both accurate and complete.

## 2019-07-10 ENCOUNTER — TELEPHONE (OUTPATIENT)
Dept: MEDICAL GROUP | Facility: PHYSICIAN GROUP | Age: 59
End: 2019-07-10

## 2019-07-10 NOTE — TELEPHONE ENCOUNTER
Phone Number Called: [phone] (home)     Call outcome: left message for patient to call back regarding message below    Message: Left voicemail to call back

## 2019-07-10 NOTE — TELEPHONE ENCOUNTER
----- Message from Nancy Hunt M.D. sent at 7/9/2019  5:56 PM PDT -----  The previous pneumonia she had in the left lung base is already resolved.  She has small area of lung collapse on the right side but otherwise no evidence of pneumonia, tumors or fluid.

## 2019-07-10 NOTE — TELEPHONE ENCOUNTER
----- Message from Nancy Hunt M.D. sent at 7/10/2019  6:30 AM PDT -----  Patient did her blood work too soon.  She was supposed to do the blood work for her next visit.  Blood sugar is borderline elevated.  Patient is prediabetic.  Avoid sweets and decrease the carbs so this will not become full-blown diabetes.  There is already improvement of her cholesterol levels since starting the medication a month ago.  The LDL or bad cholesterol decreased from 139-106.  This should go down even lower in the next few months.  Continue cholesterol medication.  Liver and kidney functions are normal.  Vitamin D increased from 18-19.  The goal is .  She just started taking the vitamin D treatment.  Continue the vitamin D treatment until finished.  Keep appointment in October.

## 2019-07-11 NOTE — DOCUMENTATION QUERY
[facility]                                                                       Query Response Note      PATIENT:               CHAYA KRISHNA  ACCT #:                  [id number]  MRN:                     [id number]  :                      1960  ADMIT DATE:       2019 11:34 AM  DISCH DATE:        2019 5:00 PM  RESPONDING  PROVIDER #:        [id number]           QUERY TEXT:    Depression is documented in the Medical Record.  Please specify the type.    NOTE:  If an appropriate response is not listed below, please respond with a new note.              The patient's Clinical Indicators include:  Patient has depression and was continued on home zoloft and cymbalta.  Options provided:   -- MDD, recurrent, in full remission   -- MDD, single episode, in full remission   -- MDD, recurrent, in partial remission   -- MDD, single episode, in partial remission   -- MDD, mild, single episode   -- MDD, mild, recurrent, current episode   -- MDD, moderate, single episode   -- MDD, moderate, recurrent, current episode   -- MDD, severe, single episode, without psychotic features   -- MDD, severe, single episode, with psychotic features   -- MDD, severe, recurrent, current episode, without psychotic features   -- MDD, severe, recurrent, current episode, with psychotic features   -- Situational/Grief Reaction depression   -- Unable to determine      Query created by: Mayelin Garcia on 2019 4:56 AM    RESPONSE TEXT:    Unable to determine       QUERY TEXT:    Encephalopathy is documented in the Medical Record. Please specify type.    NOTE:  If an appropriate response is not listed below, please respond with a new note.    The patient's Clinical Indicators include:  Patient admitted with ams suspected due to polypharmacy as patient is on multiple opiate medications at home. Meds were held and ams cleared. Patient denied taking too many  meds.  Options provided:   -- Due to medications or drugs   -- Metabolic encephalopathy   -- Other type of encephalopathy   -- Encephalopathy, Unspecified   -- Unable to determine      Query created by: Mayelin Garcia on 7/11/2019 4:56 AM    RESPONSE TEXT:    Due to medications or drugs          Electronically signed by:  LAWRENCE VALDOVINOS 7/11/2019 12:55 PM

## 2019-07-12 ENCOUNTER — TELEPHONE (OUTPATIENT)
Dept: MEDICAL GROUP | Facility: PHYSICIAN GROUP | Age: 59
End: 2019-07-12

## 2019-07-12 NOTE — TELEPHONE ENCOUNTER
Phone Number Called: [phone] (home)     Call outcome: left message for patient to call back regarding message below    Message: LVM informing patient that our office can not send records as this must be compliant with HIPPA.  Requested that she call [phone] and speak with our [employer] dept.

## 2019-07-12 NOTE — TELEPHONE ENCOUNTER
VOICEMAIL  1. Caller Name: Elina             Call Back Number: [phone] (home)     2. Message: Patient LVM requesting we fax over her last summary to [phone] for her FMLA.    3. Patient approves office to leave a detailed voicemail/MyChart message: yes

## 2019-07-15 ENCOUNTER — APPOINTMENT (OUTPATIENT)
Dept: MEDICAL GROUP | Facility: PHYSICIAN GROUP | Age: 59
End: 2019-07-15
Payer: OTHER GOVERNMENT

## 2019-07-15 ENCOUNTER — TELEPHONE (OUTPATIENT)
Dept: MEDICAL GROUP | Facility: PHYSICIAN GROUP | Age: 59
End: 2019-07-15

## 2019-07-15 NOTE — TELEPHONE ENCOUNTER
Patient called on Friday after office closed.  Not feeling well.  Called and left message for he to call me back

## 2019-07-15 NOTE — TELEPHONE ENCOUNTER
Have called and LVM for her to call back and when she does I am unable to talk to her so I return her call and she does not answer so playing phone tag,

## 2019-07-16 ENCOUNTER — HOSPITAL ENCOUNTER (OUTPATIENT)
Dept: RADIOLOGY | Facility: MEDICAL CENTER | Age: 59
End: 2019-07-16
Attending: FAMILY MEDICINE
Payer: COMMERCIAL

## 2019-07-16 ENCOUNTER — TELEPHONE (OUTPATIENT)
Dept: MEDICAL GROUP | Facility: PHYSICIAN GROUP | Age: 59
End: 2019-07-16

## 2019-07-16 ENCOUNTER — APPOINTMENT (OUTPATIENT)
Dept: MEDICAL GROUP | Facility: PHYSICIAN GROUP | Age: 59
End: 2019-07-16
Payer: COMMERCIAL

## 2019-07-16 ENCOUNTER — OFFICE VISIT (OUTPATIENT)
Dept: MEDICAL GROUP | Facility: PHYSICIAN GROUP | Age: 59
End: 2019-07-16
Payer: COMMERCIAL

## 2019-07-16 ENCOUNTER — HOSPITAL ENCOUNTER (OUTPATIENT)
Facility: MEDICAL CENTER | Age: 59
End: 2019-07-16
Attending: FAMILY MEDICINE
Payer: COMMERCIAL

## 2019-07-16 VITALS
HEIGHT: 64 IN | TEMPERATURE: 98.2 F | DIASTOLIC BLOOD PRESSURE: 60 MMHG | SYSTOLIC BLOOD PRESSURE: 109 MMHG | HEART RATE: 86 BPM | BODY MASS INDEX: 23.9 KG/M2 | WEIGHT: 139.99 LBS | OXYGEN SATURATION: 94 %

## 2019-07-16 DIAGNOSIS — R26.81 UNSTEADINESS: ICD-10-CM

## 2019-07-16 DIAGNOSIS — M25.472 BILATERAL SWELLING OF FEET AND ANKLES: ICD-10-CM

## 2019-07-16 DIAGNOSIS — R10.32 LLQ PAIN: ICD-10-CM

## 2019-07-16 DIAGNOSIS — M25.471 BILATERAL SWELLING OF FEET AND ANKLES: ICD-10-CM

## 2019-07-16 DIAGNOSIS — M25.474 BILATERAL SWELLING OF FEET AND ANKLES: ICD-10-CM

## 2019-07-16 DIAGNOSIS — M25.475 BILATERAL SWELLING OF FEET AND ANKLES: ICD-10-CM

## 2019-07-16 DIAGNOSIS — N39.0 URINARY TRACT INFECTION WITHOUT HEMATURIA, SITE UNSPECIFIED: ICD-10-CM

## 2019-07-16 DIAGNOSIS — R10.32 LEFT LOWER QUADRANT PAIN: ICD-10-CM

## 2019-07-16 LAB
APPEARANCE UR: NORMAL
BILIRUB UR STRIP-MCNC: NORMAL MG/DL
COLOR UR AUTO: NORMAL
GLUCOSE UR STRIP.AUTO-MCNC: NORMAL MG/DL
KETONES UR STRIP.AUTO-MCNC: NORMAL MG/DL
LEUKOCYTE ESTERASE UR QL STRIP.AUTO: NORMAL
NITRITE UR QL STRIP.AUTO: NORMAL
PH UR STRIP.AUTO: 6 [PH] (ref 5–8)
PROT UR QL STRIP: NORMAL MG/DL
RBC UR QL AUTO: NORMAL
SP GR UR STRIP.AUTO: 1.01
UROBILINOGEN UR STRIP-MCNC: 0.2 MG/DL

## 2019-07-16 PROCEDURE — 700117 HCHG RX CONTRAST REV CODE 255: Performed by: FAMILY MEDICINE

## 2019-07-16 PROCEDURE — 87086 URINE CULTURE/COLONY COUNT: CPT

## 2019-07-16 PROCEDURE — 74177 CT ABD & PELVIS W/CONTRAST: CPT

## 2019-07-16 PROCEDURE — 81002 URINALYSIS NONAUTO W/O SCOPE: CPT | Performed by: FAMILY MEDICINE

## 2019-07-16 PROCEDURE — 99214 OFFICE O/P EST MOD 30 MIN: CPT | Performed by: FAMILY MEDICINE

## 2019-07-16 RX ORDER — NITROFURANTOIN 25; 75 MG/1; MG/1
100 CAPSULE ORAL 2 TIMES DAILY
Qty: 10 CAP | Refills: 0 | Status: SHIPPED | OUTPATIENT
Start: 2019-07-16 | End: 2019-08-21

## 2019-07-16 RX ORDER — LACTOSE-REDUCED FOOD
LIQUID (ML) ORAL
Qty: 30 CAN | Refills: 5 | Status: SHIPPED | OUTPATIENT
Start: 2019-07-16 | End: 2020-07-19

## 2019-07-16 RX ADMIN — IOHEXOL 25 ML: 240 INJECTION, SOLUTION INTRATHECAL; INTRAVASCULAR; INTRAVENOUS; ORAL at 14:25

## 2019-07-16 RX ADMIN — IOHEXOL 100 ML: 350 INJECTION, SOLUTION INTRAVENOUS at 14:25

## 2019-07-16 NOTE — PROGRESS NOTES
Subjective:      Elina Skaggs is a 59 y.o. female who presents with Leg Swelling (knee to ankle) and Pain (left side pain)        HPI:    Patient is here for evaluation of leg swelling and left lower quadrant pain.    Bilateral swelling of feet and ankles  Patient states she has had bilateral leg swelling, right greater than left, over the last 3 days. Denies any recent long travel. Patient states she has a cough which is going away. She was here previously for bronchitis and was given antibiotic medication which helped with her symptoms.  Chest x-ray that we did on 7/9/2019 showed interval clearing of prior pneumonia that she had in June 2019 and right infrahilar atelectasis.  Patient denies any shortness of breath, orthopnea, or PND. States she sleeps with one pillow.  She continues to take enalapril for hypertension with good control of her blood pressure.  Her last blood work was noted to be on 7/9/19 which showed no liver or kidney damage. Patient is continuing to smoke about one pack per day.    LLQ pain  Patient states she has been having intermittent cramping left lower quadrant abdominal pain over the last 3-4 days. She has also had left sided body pain including her left head, neck, back, shoulder, and ankle. Episodes of the pain last for 10 minutes at a time and sometimes longer. She denies disturbances in urination or bowel movement, vomiting, loss of appetite, fevers, or chills.    Unsteadiness  Daughter states patient has been losing her balance and is falling more frequently. She notes she has fallen down the stairs. States patient has been sleepy and groggy lately. Patient is on narcotic pain medications as well as Valium prescribed by her pain specialist.  She has been previously hospitalized for altered mental status due thought to be from taking too much narcotics.  Daughter denies patient taking too much of her pain medications and states she has actually been taking less than she needs.   "Patient is requesting prescription for walker that she can get from care chest.  She is also requesting prescription for Ensure from care chest.      Past medical history, past surgical history, family history reviewed-no changes    Social history reviewed-no changes    Allergies reviewed-no changes    Medications reviewed-no changes     ROS:  As per the HPI as shown above, the rest are negative.       Objective:     /60 (BP Location: Left arm, Patient Position: Sitting, BP Cuff Size: Adult)   Pulse 86   Temp 36.8 °C (98.2 °F) (Temporal)   Ht 1.626 m (5' 4\")   Wt 63.5 kg (139 lb 15.9 oz)   SpO2 94%   BMI 24.03 kg/m²     Physical Exam  Examined alert, awake, oriented, not in distress    Neck-supple, no lymphadenopathy, no thyromegaly  Lungs- Scattered very slight wheezes and rhonchi but good air exchange, no rales  Heart-regular rate and rhythm, no murmur  Abdomen-good bowel sounds, soft, moderate tenderness left lower quadrant without rebound tenderness, no hepatosplenomegaly, no masses, no CVA tenderness  Extremities-nonpitting edema of the feet and ankles worse on the left than the right, no edema noted in the legs, no Homans sign, no calf tenderness, no clubbing, no cyanosis      Labs:  No visits with results within 1 Week(s) from this visit.   Latest known visit with results is:   Hospital Outpatient Visit on 07/09/2019   Component Date Value Ref Range Status   • Cholesterol,Tot 07/09/2019 171  100 - 199 mg/dL Final   • Triglycerides 07/09/2019 92  0 - 149 mg/dL Final   • HDL 07/09/2019 47  >=40 mg/dL Final   • LDL 07/09/2019 106* <100 mg/dL Final   • Sodium 07/09/2019 139  135 - 145 mmol/L Final   • Potassium 07/09/2019 3.7  3.6 - 5.5 mmol/L Final   • Chloride 07/09/2019 103  96 - 112 mmol/L Final   • Co2 07/09/2019 27  20 - 33 mmol/L Final   • Anion Gap 07/09/2019 9.0  0.0 - 11.9 Final   • Glucose 07/09/2019 118* 65 - 99 mg/dL Final   • Bun 07/09/2019 9  8 - 22 mg/dL Final   • Creatinine 07/09/2019 " 0.62  0.50 - 1.40 mg/dL Final   • Calcium 07/09/2019 9.5  8.5 - 10.5 mg/dL Final   • AST(SGOT) 07/09/2019 17  12 - 45 U/L Final   • ALT(SGPT) 07/09/2019 20  2 - 50 U/L Final   • Alkaline Phosphatase 07/09/2019 84  30 - 99 U/L Final   • Total Bilirubin 07/09/2019 0.3  0.1 - 1.5 mg/dL Final   • Albumin 07/09/2019 4.1  3.2 - 4.9 g/dL Final   • Total Protein 07/09/2019 7.1  6.0 - 8.2 g/dL Final   • Globulin 07/09/2019 3.0  1.9 - 3.5 g/dL Final   • A-G Ratio 07/09/2019 1.4  g/dL Final   • Glycohemoglobin 07/09/2019 5.9* 0.0 - 5.6 % Final    Comment: Increased risk for diabetes:  5.7 -6.4%  Diabetes:  >6.4%  Glycemic control for adults with diabetes:  <7.0%  The above interpretations are per ADA guidelines.  Diagnosis  of diabetes mellitus on the basis of elevated Hemoglobin A1c  should be confirmed by repeating the Hb A1c test.     • Est Avg Glucose 07/09/2019 123  mg/dL Final    Comment: The eAG calculation is based on the A1c-Derived Daily Glucose  (ADAG) study.  See the ADA's website for additional information.     • 25-Hydroxy   Vitamin D 25 07/09/2019 19* 30 - 100 ng/mL Final    Comment: Adult Ranges:   <20 ng/mL - Deficiency  20-29 ng/mL - Insufficiency   ng/mL - Sufficiency  The [employer] Centaur Vitamin D Assay is standardized to the  [facility] reference measurement procedures, a  reference method for the Vitamin D Standardization Program  (VDSP).  The VDSP aligns patient results among 25 (OH)  Vitamin D methods.     • Fasting Status 07/09/2019 Fasting   Final   • GFR If  07/09/2019 >60  >60 mL/min/1.73 m 2 Final   • GFR If Non  07/09/2019 >60  >60 mL/min/1.73 m 2 Final     Urine dipstick    POCT URINALYSIS (Order #[id number]) on 7/16/19   Component Results     Component Value Ref Range & Units Status   POC Color yl  Negative Final   POC Appearance clr  Negative Final   POC Leukocyte Esterase trace  Negative Final   POC Nitrites neg  Negative Final   POC Urobiligen 0.2   Negative (0.2) mg/dL Final   POC Protein neg  Negative mg/dL Final   POC Urine PH 6.0  5.0 - 8.0 Final   POC Blood small  Negative Final   POC Specific Gravity 1.015  <1.005 - >1.030 Final   POC Ketones neg  Negative mg/dL Final   POC Bilirubin neg  Negative mg/dL Final   POC Glucose neg  Negative mg/dL Final            Assessment/Plan:     1. Bilateral swelling of feet and ankles  - Informed patient the swelling is possibly due to heat. She denies any recent long travel. Ordered labs to to make sure were not dealing with heart failure.  I am also checking d-dimer to make sure were not dealing with venous thromboembolism.  Further recommendation will depend on results..  - CBC WITH DIFFERENTIAL; Future  - proBrain Natriuretic Peptide, NT; Future  - D-DIMER; Future    2. LLQ pain  - Informed patient she possibly has acute diverticulitis. Ordered labs and stat CT abdomen and pelvis with contrast for evaluation, see #3 below.  -We will treat with antibiotics when we get the results.  She has an appointment this afternoon for the stat CT of the abdomen and pelvis with contrast.  We will send the urine for culture to confirm for infection.  We will communicate results to patient.  - CBC WITH DIFFERENTIAL; Future  - proBrain Natriuretic Peptide, NT; Future  - D-DIMER; Future    3. Left lower quadrant pain  - Same as #2  - CT-ABDOMEN-PELVIS WITH; Future  - POCT Urinalysis    4. Unsteadiness  - Daughter notes patient has been sleepy and groggy lately. She has also been unsteady and has been falling more often. Informed patient this is possibly due to her being on narcotic pain medication and valium.   - Will order a walker.  We will also give her a prescription for Ensure so she can get this from the care of chest.  - Misc. Devices Misc; walker  Dispense: 1 Device; Refill: 0       I, Omar Morin (Scribe), am scribing for, and in the presence of, Nancy Hunt MD     Electronically signed by: Omar Morin  (Scribe), 7/16/2019    Nancy LEVI MD personally performed the services described in this documentation, as scribed by Omar Morin in my presence, and it is both accurate and complete.

## 2019-07-17 ENCOUNTER — TELEPHONE (OUTPATIENT)
Dept: MEDICAL GROUP | Facility: PHYSICIAN GROUP | Age: 59
End: 2019-07-17

## 2019-07-19 ENCOUNTER — TELEPHONE (OUTPATIENT)
Dept: MEDICAL GROUP | Facility: PHYSICIAN GROUP | Age: 59
End: 2019-07-19

## 2019-07-19 LAB
BACTERIA UR CULT: NORMAL
SIGNIFICANT IND 70042: NORMAL
SITE SITE: NORMAL
SOURCE SOURCE: NORMAL

## 2019-07-19 NOTE — TELEPHONE ENCOUNTER
Phone Number Called: [phone] (home)     Call outcome: spoke to patient regarding message below    Message: Spoke to patient about lab results. Instructed patient to stop taking anitbiotics.

## 2019-07-19 NOTE — TELEPHONE ENCOUNTER
Phone Number Called: [phone] (home)     Call outcome: left message for patient to call back regarding message below    Message: LVM to call back.

## 2019-07-19 NOTE — TELEPHONE ENCOUNTER
VOICEMAIL  1. Caller Name: Elina Skaggs                        Call Back Number: [phone] (home)     2. Message: Patient called back for her lab results    3. Patient approves office to leave a detailed voicemail/MyChart message: N\A

## 2019-07-19 NOTE — TELEPHONE ENCOUNTER
----- Message from Nancy Hunt M.D. sent at 7/19/2019 10:58 AM PDT -----  Urine culture without evidence of infection.  Discontinue antibiotics because she does not need them.  Please make sure she gets the blood work I ordered.  The blood work is not fasting.

## 2019-07-23 ENCOUNTER — TELEPHONE (OUTPATIENT)
Dept: MEDICAL GROUP | Facility: PHYSICIAN GROUP | Age: 59
End: 2019-07-23

## 2019-07-23 NOTE — TELEPHONE ENCOUNTER
Toney needed phone number for Neurosurgeonn that is to due her surgery.  She stated that she has called the number and the say they will return her call and never due.

## 2019-07-24 ENCOUNTER — HOSPITAL ENCOUNTER (OUTPATIENT)
Dept: LAB | Facility: MEDICAL CENTER | Age: 59
End: 2019-07-24
Attending: FAMILY MEDICINE
Payer: COMMERCIAL

## 2019-07-24 ENCOUNTER — OFFICE VISIT (OUTPATIENT)
Dept: MEDICAL GROUP | Facility: PHYSICIAN GROUP | Age: 59
End: 2019-07-24
Payer: COMMERCIAL

## 2019-07-24 VITALS
HEART RATE: 79 BPM | TEMPERATURE: 98.1 F | SYSTOLIC BLOOD PRESSURE: 100 MMHG | HEIGHT: 64 IN | BODY MASS INDEX: 24.46 KG/M2 | WEIGHT: 143.3 LBS | OXYGEN SATURATION: 91 % | DIASTOLIC BLOOD PRESSURE: 60 MMHG

## 2019-07-24 DIAGNOSIS — M25.474 BILATERAL SWELLING OF FEET AND ANKLES: ICD-10-CM

## 2019-07-24 DIAGNOSIS — M25.472 BILATERAL SWELLING OF FEET AND ANKLES: ICD-10-CM

## 2019-07-24 DIAGNOSIS — M25.471 BILATERAL SWELLING OF FEET AND ANKLES: ICD-10-CM

## 2019-07-24 DIAGNOSIS — R10.32 LLQ PAIN: ICD-10-CM

## 2019-07-24 DIAGNOSIS — I67.1 INTRACEREBRAL ANEURYSM: ICD-10-CM

## 2019-07-24 DIAGNOSIS — M25.475 BILATERAL SWELLING OF FEET AND ANKLES: ICD-10-CM

## 2019-07-24 LAB — NT-PROBNP SERPL IA-MCNC: 129 PG/ML (ref 0–125)

## 2019-07-24 PROCEDURE — 36415 COLL VENOUS BLD VENIPUNCTURE: CPT | Mod: GA

## 2019-07-24 PROCEDURE — 99214 OFFICE O/P EST MOD 30 MIN: CPT | Performed by: FAMILY MEDICINE

## 2019-07-24 PROCEDURE — 83880 ASSAY OF NATRIURETIC PEPTIDE: CPT | Mod: GA

## 2019-07-24 NOTE — PROGRESS NOTES
Subjective:      Elina Skaggs is a 59 y.o. female who presents with Edema (bi l ateral feet and legs)        HPI:  The patient is here for follow up of her LLQ pain, bilateral ankle swelling, and intracerebral aneurysm.     LLQ pain  Regarding patient's left lower quadrant pain, this has resolved since our last visit. Patient did complete her outpatient abdominal CT which in fact revealed significnat stool in the colon likely related to constipation secondary to her narcotic pain medication. The CT did not show any evidence of diverticulitis or diverticulosis or other abnormalities to explain abdominal pain.. Additionally, the patient states that she attempted to have her colonoscopy done; however, she did not complete enough prep and was therefore unable to complete the procedure. She will complete two rounds of prep before attempting to complete the colonoscopy in the next few weeks.     Bilateral swelling of feet and ankles  At our last visit, the patient complained of 3 days of acute bilateral leg swelling. Patient was given orders to complete labs in order to assess for evidence of CHF or blood clots. She states that she continues to experience intermittent leg swelling, although her swelling appears to be resolving at this time. She will plan to complete the labs after today's visit.     Intracerebral aneurysm  Patient was recently diagnosed with an intracerebral aneurysm in April 2019 after MRA of the brain was done regarding work-up for new onset headaches. Patient has met with neurology and was subsequently referred to the interventional radiologist. She has been unable contact the interventional radiologist to schedule an appointment for transcutaneous repair or coiling.  She does continue to complain of headaches and states that she often has to sit in the dark in her home in order to relieve her pain.     Additionally, the patient states that she continues to take Chantix. She states that in the last 3  "weeks she has cut her cigarettes down to 2.5 cigarettes daily. She hopes to continue to take Chantix in hopes to quit smoking completely.     Past medical history, past surgical history, family history reviewed-no changes    Social history reviewed-no changes    Allergies reviewed-no changes    Medications reviewed-no changes      ROS:  As per the HPI as shown above, the rest are negative.       Objective:     /60 (BP Location: Left arm, Patient Position: Sitting, BP Cuff Size: Adult)   Pulse 79   Temp 36.7 °C (98.1 °F) (Temporal)   Ht 1.626 m (5' 4\")   Wt 65 kg (143 lb 4.8 oz)   SpO2 91%   BMI 24.60 kg/m²     Physical Exam  Examined alert, awake, oriented, not in distress    Neck-supple, no lymphadenopathy, no thyromegaly  Lungs-clear to auscultation, no rales, no wheezes  Heart-regular rate and rhythm, no murmur  Extremities-no more edema noted, clubbing, cyanosis       Labs:  Hospital Outpatient Visit on 07/16/2019   Component Date Value Ref Range Status   • Significant Indicator 07/16/2019 NEG   Final   • Source 07/16/2019 UR   Final   • Site 07/16/2019 URINE, CLEAN CATCH   Final   • Culture Result 07/16/2019 Mixed skin rylan ,000 cfu/mL   Final   Office Visit on 07/16/2019   Component Date Value Ref Range Status   • POC Color 07/16/2019 yl  Negative Final   • POC Appearance 07/16/2019 clr  Negative Final   • POC Leukocyte Esterase 07/16/2019 trace  Negative Final   • POC Nitrites 07/16/2019 neg  Negative Final   • POC Urobiligen 07/16/2019 0.2  Negative (0.2) mg/dL Final   • POC Protein 07/16/2019 neg  Negative mg/dL Final   • POC Urine PH 07/16/2019 6.0  5.0 - 8.0 Final   • POC Blood 07/16/2019 small  Negative Final   • POC Specific Gravity 07/16/2019 1.015  <1.005 - >1.030 Final   • POC Ketones 07/16/2019 neg  Negative mg/dL Final   • POC Bilirubin 07/16/2019 neg  Negative mg/dL Final   • POC Glucose 07/16/2019 neg  Negative mg/dL Final   Hospital Outpatient Visit on 07/09/2019   Component " Date Value Ref Range Status   • Cholesterol,Tot 07/09/2019 171  100 - 199 mg/dL Final   • Triglycerides 07/09/2019 92  0 - 149 mg/dL Final   • HDL 07/09/2019 47  >=40 mg/dL Final   • LDL 07/09/2019 106* <100 mg/dL Final   • Sodium 07/09/2019 139  135 - 145 mmol/L Final   • Potassium 07/09/2019 3.7  3.6 - 5.5 mmol/L Final   • Chloride 07/09/2019 103  96 - 112 mmol/L Final   • Co2 07/09/2019 27  20 - 33 mmol/L Final   • Anion Gap 07/09/2019 9.0  0.0 - 11.9 Final   • Glucose 07/09/2019 118* 65 - 99 mg/dL Final   • Bun 07/09/2019 9  8 - 22 mg/dL Final   • Creatinine 07/09/2019 0.62  0.50 - 1.40 mg/dL Final   • Calcium 07/09/2019 9.5  8.5 - 10.5 mg/dL Final   • AST(SGOT) 07/09/2019 17  12 - 45 U/L Final   • ALT(SGPT) 07/09/2019 20  2 - 50 U/L Final   • Alkaline Phosphatase 07/09/2019 84  30 - 99 U/L Final   • Total Bilirubin 07/09/2019 0.3  0.1 - 1.5 mg/dL Final   • Albumin 07/09/2019 4.1  3.2 - 4.9 g/dL Final   • Total Protein 07/09/2019 7.1  6.0 - 8.2 g/dL Final   • Globulin 07/09/2019 3.0  1.9 - 3.5 g/dL Final   • A-G Ratio 07/09/2019 1.4  g/dL Final   • Glycohemoglobin 07/09/2019 5.9* 0.0 - 5.6 % Final   • Est Avg Glucose 07/09/2019 123  mg/dL Final   • 25-Hydroxy   Vitamin D 25 07/09/2019 19* 30 - 100 ng/mL Final   • Fasting Status 07/09/2019 Fasting   Final   • GFR If  07/09/2019 >60  >60 mL/min/1.73 m 2 Final   • GFR If Non  07/09/2019 >60  >60 mL/min/1.73 m 2 Final        Assessment/Plan:     1. Bilateral swelling of feet and ankles  Patient's swelling is significantly improved since last visit. The swelling is likely related to the hot weather and or dependent edema. Patient did not complete her previously ordered labs to assess for CHF or evidence of DVT. Encouraged patient to complete previously ordered labs following today's visit.  Very low suspicion for DVT.  We will only have her do the BNP at this time.  We do not need to do the d-dimer.  - proBrain Natriuretic Peptide, NT;  Future    2. LLQ pain  Patient's pain has resolved at this time. We have reviewed her recent abdominal CT indicating significant stool in the colon. Informed patient that the constipation was likely secondary to her narcotic pain medication. She will complete her colonoscopy in the next few weeks with double dose of prep. We will continue to monitor.  Discussed the need to increase her fluid intake and to take fiber supplement such as MiraLAX to help with the constipation.    3. Intracerebral aneurysm  This was diagnosed in April 2019 with an MRA as work-up for her new onset headache.  She has been seen and evaluated by neurosurgery who subsequently referred her to interventional radiologist for consideration for transcutaneous repair/coiling.  She is waiting for the phone call to set up an appointment. If patient does not hear from their office by Monday of next week, she is to contact our office and we will work to get her seen promptly.           Edmar LEVI (Jil), am scribing for, and in the presence of, Nancy Hunt MD     Electronically signed by: Edmar Love (Jil), 7/24/2019    Nancy LEVI MD personally performed the services described in this documentation, as scribed by Edmar Love in my presence, and it is both accurate and complete.

## 2019-07-25 ENCOUNTER — TELEPHONE (OUTPATIENT)
Dept: MEDICAL GROUP | Facility: PHYSICIAN GROUP | Age: 59
End: 2019-07-25

## 2019-07-25 NOTE — TELEPHONE ENCOUNTER
Phone Number Called: [phone] (home)     Call outcome: left message for patient to call back regarding message below    Message: Left message for patient to call back for results

## 2019-07-25 NOTE — TELEPHONE ENCOUNTER
----- Message from Nancy Hnut M.D. sent at 7/25/2019  7:03 AM PDT -----  Blood work results to check for possible heart failure to cause the leg edema came back this is only 4 points above normal therefore not considered abnormal.

## 2019-07-29 ENCOUNTER — TELEPHONE (OUTPATIENT)
Dept: MEDICAL GROUP | Facility: PHYSICIAN GROUP | Age: 59
End: 2019-07-29

## 2019-07-30 NOTE — TELEPHONE ENCOUNTER
Toney called and was asking about what was going on about her collapsed lung and coughing that she is experiencing.  She wants to know if medication  Is being sent to pharmacy.  Called her back and no answer.  Left voice mail.

## 2019-07-30 NOTE — TELEPHONE ENCOUNTER
Called patient.  It was her voicemail.  I told her to give us a call sooner she can so I can go over with her the plan of care.  On further review of her medical chart, in December 2015 she was seen at the ER and had a CAT scan of the chest that showed incidental finding of pulmonary nodule.  She will need a follow-up CAT scan especially if she has ongoing cough and history of smoking.

## 2019-07-31 ENCOUNTER — TELEPHONE (OUTPATIENT)
Dept: MEDICAL GROUP | Facility: PHYSICIAN GROUP | Age: 59
End: 2019-07-31

## 2019-07-31 DIAGNOSIS — R91.1 PULMONARY NODULE: ICD-10-CM

## 2019-07-31 NOTE — TELEPHONE ENCOUNTER
Spoke with patient.  Explained to her that we need to do a CT of the chest for follow-up of nodule seen on CAT scan of the chest in 2015 done in the ER when she had a fall that caused rib injury with fractures.  This is also to evaluate the lungs because of her ongoing wheezing and cough.  Order placed.  We discussed paperwork for disability.  I told the patient I will wait until she gets the aneurysm repaired so I can put the date when she can return back to work depending on the recommendation by interventional radiologist.

## 2019-07-31 NOTE — TELEPHONE ENCOUNTER
Elina called and is still confused and asked if you could call her back      1. Caller Name: Elina Skaggs                                           Call Back Number: [phone]      Patient approves a detailed voicemail message: yes

## 2019-08-02 ENCOUNTER — TELEPHONE (OUTPATIENT)
Dept: MEDICAL GROUP | Facility: PHYSICIAN GROUP | Age: 59
End: 2019-08-02

## 2019-08-02 NOTE — TELEPHONE ENCOUNTER
VOICEMAIL  1. Caller Name: Elina                     Call Back Number: [phone] (home)   2. Message: Patient LVM stating she has CXR scheduled for Tuesday and is requesting a sedative as she is clausterphobic.     3. Patient approves office to leave a detailed voicemail/MyChart message: yes

## 2019-08-05 NOTE — TELEPHONE ENCOUNTER
Patient is already getting diazepam from another provider which is a good medication to help her relax.  She just got a 30-day supply on 8/4/2019.  She can take 1 tablet 30 minutes before her procedure.

## 2019-08-06 ENCOUNTER — APPOINTMENT (OUTPATIENT)
Dept: RADIOLOGY | Facility: MEDICAL CENTER | Age: 59
End: 2019-08-06
Attending: FAMILY MEDICINE
Payer: COMMERCIAL

## 2019-08-07 ENCOUNTER — HOSPITAL ENCOUNTER (OUTPATIENT)
Dept: RADIOLOGY | Facility: MEDICAL CENTER | Age: 59
End: 2019-08-07
Attending: FAMILY MEDICINE
Payer: COMMERCIAL

## 2019-08-07 ENCOUNTER — HOSPITAL ENCOUNTER (OUTPATIENT)
Dept: RADIOLOGY | Facility: MEDICAL CENTER | Age: 59
End: 2019-08-07
Attending: NEUROLOGICAL SURGERY
Payer: COMMERCIAL

## 2019-08-07 DIAGNOSIS — M54.2 NECK PAIN: ICD-10-CM

## 2019-08-07 DIAGNOSIS — R91.1 PULMONARY NODULE: ICD-10-CM

## 2019-08-07 PROCEDURE — 71250 CT THORAX DX C-: CPT

## 2019-08-07 PROCEDURE — 72050 X-RAY EXAM NECK SPINE 4/5VWS: CPT

## 2019-08-08 DIAGNOSIS — J18.9 ATYPICAL PNEUMONIA: ICD-10-CM

## 2019-08-08 DIAGNOSIS — R93.89 ABNORMAL CT OF THE CHEST: ICD-10-CM

## 2019-08-08 RX ORDER — AZITHROMYCIN 250 MG/1
TABLET, FILM COATED ORAL
Qty: 6 TAB | Refills: 0 | Status: SHIPPED | OUTPATIENT
Start: 2019-08-08 | End: 2019-08-21

## 2019-08-12 DIAGNOSIS — E78.5 DYSLIPIDEMIA: ICD-10-CM

## 2019-08-12 RX ORDER — ATORVASTATIN CALCIUM 20 MG/1
20 TABLET, FILM COATED ORAL EVERY EVENING
Qty: 90 TAB | Refills: 1 | Status: SHIPPED | OUTPATIENT
Start: 2019-08-12 | End: 2019-12-05 | Stop reason: SDUPTHER

## 2019-08-19 ENCOUNTER — HOSPITAL ENCOUNTER (OUTPATIENT)
Dept: RADIOLOGY | Facility: MEDICAL CENTER | Age: 59
End: 2019-08-19

## 2019-08-19 NOTE — PROGRESS NOTES
Neuro Interventional Service Consultation      Re: Elina Skaggs     MRN: [id number]   : 1960    Elina Skaggs was referred to our service by Daryl France MD. She is a 59 y.o. female seen in clinic for evaluation and possible intervention for an incidental aneurysm. She is also under the care of Nancy Hunt MD and Checo Aldrich MD.    History of Present Illness:   has a history of frequent, intractable headaches and cervical spine surgery. She underwent workup for the headaches and imaging revealed an incidental tiny left cavernous internal carotid artery aneurysm. She has been referred to the Neurointerventional Service for evaluation and management of the finding. Today, she complains of ongoing headaches, dizziness, memory loss, and balance difficulty. She fell down the stairs at home recently and has low back/ buttock pain as a result. Her headaches are in the posterior left side of her head and her daughter indicates she is on treatment for migraine headaches. She reports a family history of a paternal aunt and uncle expiring from aneurysm rupture. She reports a great deal of anxiety about this finding. She is accompanied by her daughter to the appointment today.    She is seen today for review of imaging studies and discussion of possible aneurysm intervention.     Past Medical History:   Diagnosis Date   • Aneurysm of cavernous portion of left internal carotid artery 2019   • Anxiety 1993    Dr. Freeman   • Chronic back pain    • Constipation 2013   • Dental disorder     dentures   • Hyperlipidemia    • Hypertension    • Impaired physical mobility 2013   • Lung nodule < 6cm on CT 2015   • MEDICAL HOME 2015   • Psychiatric problem     depression   • Sleep apnea    • Snoring      Past Surgical History:   Procedure Laterality Date   • COLONOSCOPY WITH POLYP  13    performed by Dr. Kelley -sigmoid colon polyp-fragments of tubular adenoma   • CERVICAL FUSION POSTERIOR  2012     Performed by PAVEL KIM at [facility]   • CERVICAL DISK AND FUSION ANTERIOR  11/16/2010    Performed by PAVEL KIM at [facility]   • APPENDECTOMY CHILD  1974   • APPENDECTOMY     • TUBAL COAGULATION LAPAROSCOPIC BILATERAL       Social History     Socioeconomic History   • Marital status: Legally      Spouse name: Not on file   • Number of children: Not on file   • Years of education: Not on file   • Highest education level: Not on file   Occupational History   • Occupation: [employer]     Employer: [employer]   Social Needs   • Financial resource strain: Not on file   • Food insecurity:     Worry: Not on file     Inability: Not on file   • Transportation needs:     Medical: Not on file     Non-medical: Not on file   Tobacco Use   • Smoking status: Current Every Day Smoker     Packs/day: 0.10     Years: 37.00     Pack years: 3.70     Types: Cigarettes   • Smokeless tobacco: Never Used   • Tobacco comment: 4 cig/day with chantix down to 2 cig/day since 7/1/19   Substance and Sexual Activity   • Alcohol use: No   • Drug use: No   • Sexual activity: Never     Partners: Male     Birth control/protection: Surgical   Lifestyle   • Physical activity:     Days per week: Not on file     Minutes per session: Not on file   • Stress: Not on file   Relationships   • Social connections:     Talks on phone: Not on file     Gets together: Not on file     Attends Sikh service: Not on file     Active member of club or organization: Not on file     Attends meetings of clubs or organizations: Not on file     Relationship status: Not on file   • Intimate partner violence:     Fear of current or ex partner: Not on file     Emotionally abused: Not on file     Physically abused: Not on file     Forced sexual activity: Not on file   Other Topics Concern   • Not on file   Social History Narrative    ** Merged History Encounter **          Family History   Problem Relation Age of  Onset   • Cancer Mother         lung cancer   • Heart Disease Father         MI   • Diabetes Father    • Genitourinary () Problems Father         renal failure on dialysis   • Diabetes Sister    • Genitourinary () Problems Sister         renal failure   • Diabetes Brother      Review of Systems   Eyes: Negative for blurred vision.   Respiratory: Positive for cough.    Musculoskeletal: Positive for back pain, falls and neck pain.   Neurological: Positive for dizziness, weakness and headaches.   Psychiatric/Behavioral: Positive for memory loss.     A comprehensive 14-point review of systems was negative except as described above.     Labs:      Ref. Range 6/17/2019 12:53 7/9/2019 12:33   WBC Latest Ref Range: 4.8 - 10.8 K/uL 4.7 (L)    RBC Latest Ref Range: 4.20 - 5.40 M/uL 3.80 (L)    Hemoglobin Latest Ref Range: 12.0 - 16.0 g/dL 11.4 (L)    Hematocrit Latest Ref Range: 37.0 - 47.0 % 35.8 (L)    MCV Latest Ref Range: 81.4 - 97.8 fL 94.2    MCH Latest Ref Range: 27.0 - 33.0 pg 30.0    MCHC Latest Ref Range: 33.6 - 35.0 g/dL 31.8 (L)    RDW Latest Ref Range: 35.9 - 50.0 fL 47.2    Platelet Count Latest Ref Range: 164 - 446 K/uL 299    MPV Latest Ref Range: 9.0 - 12.9 fL 10.6    Neutrophils-Polys Latest Ref Range: 44.00 - 72.00 % 38.20 (L)    Neutrophils (Absolute) Latest Ref Range: 2.00 - 7.15 K/uL 1.78 (L)    Lymphocytes Latest Ref Range: 22.00 - 41.00 % 50.20 (H)    Lymphs (Absolute) Latest Ref Range: 1.00 - 4.80 K/uL 2.34    Monocytes Latest Ref Range: 0.00 - 13.40 % 8.80    Monos (Absolute) Latest Ref Range: 0.00 - 0.85 K/uL 0.41    Eosinophils Latest Ref Range: 0.00 - 6.90 % 1.30    Eos (Absolute) Latest Ref Range: 0.00 - 0.51 K/uL 0.06    Basophils Latest Ref Range: 0.00 - 1.80 % 1.30    Baso (Absolute) Latest Ref Range: 0.00 - 0.12 K/uL 0.06    Immature Granulocytes Latest Ref Range: 0.00 - 0.90 % 0.20    Immature Granulocytes (abs) Latest Ref Range: 0.00 - 0.11 K/uL 0.01    Nucleated RBC Latest Units: /100  WBC 0.00    NRBC (Absolute) Latest Units: K/uL 0.00    Sodium Latest Ref Range: 135 - 145 mmol/L 139 139   Potassium Latest Ref Range: 3.6 - 5.5 mmol/L 4.1 3.7   Chloride Latest Ref Range: 96 - 112 mmol/L 105 103   Co2 Latest Ref Range: 20 - 33 mmol/L 29 27   Anion Gap Latest Ref Range: 0.0 - 11.9  5.0 9.0   Glucose Latest Ref Range: 65 - 99 mg/dL 119 (H) 118 (H)   Bun Latest Ref Range: 8 - 22 mg/dL 15 9   Creatinine Latest Ref Range: 0.50 - 1.40 mg/dL 0.77 0.62   GFR If  Latest Ref Range: >60 mL/min/1.73 m 2 >60 >60   GFR If Non  Latest Ref Range: >60 mL/min/1.73 m 2 >60 >60   Calcium Latest Ref Range: 8.5 - 10.5 mg/dL 8.9 9.5   AST(SGOT) Latest Ref Range: 12 - 45 U/L 16 17   ALT(SGPT) Latest Ref Range: 2 - 50 U/L 15 20   Alkaline Phosphatase Latest Ref Range: 30 - 99 U/L 72 84   Total Bilirubin Latest Ref Range: 0.1 - 1.5 mg/dL 0.5 0.3   Albumin Latest Ref Range: 3.2 - 4.9 g/dL 3.7 4.1   Total Protein Latest Ref Range: 6.0 - 8.2 g/dL 6.3 7.1   Globulin Latest Ref Range: 1.9 - 3.5 g/dL 2.6 3.0   A-G Ratio Latest Units: g/dL 1.4 1.4   Glycohemoglobin Latest Ref Range: 0.0 - 5.6 %  5.9 (H)   Estim. Avg Glu Latest Units: mg/dL  123     Radiology:   MRA head on April 25, 2019 at [facility]:  2 mm saccular aneurysm off o the left cavernous segment of the ICA. Otherwise unremarkable MR angiogram of brain.        MRI brain April 25, 2019 at [facility]:  1. No acute intracranial process.  2. Mild chronic small vessel ischemic disease.  3. 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     Current Outpatient Medications   Medication Sig Dispense Refill   • atorvastatin (LIPITOR) 20 MG Tab Take 1 Tab by mouth every evening. 90 Tab 1   • azithromycin (ZITHROMAX) 250 MG Tab Take by mouth 2 tablets the first day then 1 tablet daily for 4 days. 6 Tab 0   • Misc. Devices Misc walker 1 Device 0   •  Nutritional Supplements (ENSURE) Liquid 1 can by mouth per day 30 Can 5   • nitrofurantoin monohyd macro (MACROBID) 100 MG Cap Take 1 Cap by mouth 2 times a day. 10 Cap 0   • doxycycline (VIBRAMYCIN) 100 MG Tab Take 1 Tab by mouth 2 times a day. 14 Tab 0   • albuterol 108 (90 Base) MCG/ACT Aero Soln inhalation aerosol Inhale 2 Puffs by mouth every 6 hours as needed for Shortness of Breath. 8.5 g 1   • vitamin D, Ergocalciferol, (DRISDOL) 07712 units Cap capsule Take 1 Cap by mouth every 7 days. 12 Cap 0   • sertraline (ZOLOFT) 25 MG tablet Take 25 mg by mouth every bedtime.  0   • diazepam (VALIUM) 10 MG tablet Take 10 mg by mouth 1 time daily as needed for Anxiety or Sleep.     • oxyCODONE immediate release (ROXICODONE) 10 MG immediate release tablet Take 10 mg by mouth every 6 hours as needed for Moderate Pain.     • SUMAtriptan (IMITREX) 50 MG Tab Take 50 mg by mouth 1 time daily as needed for Migraine.     • morphine ER (MS CONTIN) 15 MG Tab CR tablet Take 15 mg by mouth every day.  0   • enalapril (VASOTEC) 20 MG tablet Take 1 Tab by mouth every day. 90 Tab 1     No current facility-administered medications for this encounter.        Allergies   Allergen Reactions   • Cymbalta [Duloxetine Hcl] Rash and Itching     Redness to anterior neck.       Physical Exam   Constitutional: She is oriented to person, place, and time and well-developed, well-nourished, and in no distress.   HENT:   Head: Normocephalic.   Pulmonary/Chest: Effort normal. No respiratory distress.   Neurological: She is alert and oriented to person, place, and time. Coordination normal.   Skin: Skin is warm and dry. No rash noted. No erythema. No pallor.   Psychiatric: Mood, memory, affect and judgment normal.     Impression:   1. Unruptured left cavernous internal carotid artery aneurysm versus infundibulum, 1 mm in size.  2. Headache.    Plan:   Christiano Acuña MD has reviewed 's history and imaging studies, examined the patient,  and discussed treatment options.  has a small, incidental finding that could represent an aneurysm that is extradural, however upon review it more closely resembles an infundibulum or even a small artery origin. It is asymptomatic and not contributing to her headache or balance complaints. If it does represent aneurysm, the five year risk of rupture is 0%. Intervention is not indicated unless the aneurysm is 2 cm in size as rupture is not life threatening. Risk of an endovascular procedure is 1-3%, therefore risk of intervention outweighs benefit for aneurysms in this location. Furthermore, risk of complication of a catheter angiogram to determine the exact nature of the finding would also outweigh the benefit of diagnosis. If this represents an aneurysm and it were to rupture in the future, symptoms from a caroticocavernous fistula would be eye bulging, redness and weeping and vision loss, at which point endovascular intervention would be indicated. However, we explained that given its size and location it is unlikely to rupture in her lifetime. Consideration could be given to a single follow up MRA in one year for any change in size but if it is stable, no further follow up would be necessary. Counseling was provided to the patient and her daughter and all questions answered.     RADHA Singh with Christiano Acuña MD  Neuro Interventional Service   [facility]   [id number]  [place] [address]  [phone]

## 2019-08-20 ENCOUNTER — APPOINTMENT (OUTPATIENT)
Dept: PULMONOLOGY | Facility: HOSPICE | Age: 59
End: 2019-08-20
Payer: OTHER GOVERNMENT

## 2019-08-20 ENCOUNTER — HOSPITAL ENCOUNTER (OUTPATIENT)
Dept: CARDIOLOGY | Facility: MEDICAL CENTER | Age: 59
End: 2019-08-20
Attending: FAMILY MEDICINE
Payer: COMMERCIAL

## 2019-08-20 DIAGNOSIS — R93.89 ABNORMAL CT OF THE CHEST: ICD-10-CM

## 2019-08-20 LAB
LV EJECT FRACT  99904: 65
LV EJECT FRACT MOD 2C 99903: 69.2
LV EJECT FRACT MOD 4C 99902: 68.79
LV EJECT FRACT MOD BP 99901: 67.6

## 2019-08-20 PROCEDURE — 93306 TTE W/DOPPLER COMPLETE: CPT

## 2019-08-20 PROCEDURE — 93306 TTE W/DOPPLER COMPLETE: CPT | Mod: 26 | Performed by: INTERNAL MEDICINE

## 2019-08-21 ENCOUNTER — HOSPITAL ENCOUNTER (OUTPATIENT)
Dept: PAIN MANAGEMENT | Facility: REHABILITATION | Age: 59
End: 2019-08-21
Attending: PAIN MEDICINE
Payer: COMMERCIAL

## 2019-08-21 ENCOUNTER — HOSPITAL ENCOUNTER (OUTPATIENT)
Dept: RADIOLOGY | Facility: MEDICAL CENTER | Age: 59
End: 2019-08-21
Attending: NEUROLOGICAL SURGERY
Payer: COMMERCIAL

## 2019-08-21 ENCOUNTER — HOSPITAL ENCOUNTER (OUTPATIENT)
Dept: RADIOLOGY | Facility: REHABILITATION | Age: 59
End: 2019-08-21
Attending: PAIN MEDICINE

## 2019-08-21 ENCOUNTER — APPOINTMENT (OUTPATIENT)
Dept: MEDICAL GROUP | Facility: PHYSICIAN GROUP | Age: 59
End: 2019-08-21
Payer: OTHER GOVERNMENT

## 2019-08-21 VITALS
WEIGHT: 138.23 LBS | SYSTOLIC BLOOD PRESSURE: 154 MMHG | HEIGHT: 64 IN | DIASTOLIC BLOOD PRESSURE: 97 MMHG | TEMPERATURE: 98 F | RESPIRATION RATE: 16 BRPM | HEART RATE: 71 BPM | BODY MASS INDEX: 23.6 KG/M2 | OXYGEN SATURATION: 93 %

## 2019-08-21 DIAGNOSIS — M54.2 NECK PAIN: ICD-10-CM

## 2019-08-21 PROCEDURE — 62321 NJX INTERLAMINAR CRV/THRC: CPT

## 2019-08-21 PROCEDURE — G0260 INJ FOR SACROILIAC JT ANESTH: HCPCS

## 2019-08-21 PROCEDURE — 72050 X-RAY EXAM NECK SPINE 4/5VWS: CPT

## 2019-08-21 PROCEDURE — 700111 HCHG RX REV CODE 636 W/ 250 OVERRIDE (IP)

## 2019-08-21 PROCEDURE — 700117 HCHG RX CONTRAST REV CODE 255

## 2019-08-21 PROCEDURE — 99152 MOD SED SAME PHYS/QHP 5/>YRS: CPT

## 2019-08-21 RX ORDER — MIDAZOLAM HYDROCHLORIDE 1 MG/ML
INJECTION INTRAMUSCULAR; INTRAVENOUS
Status: COMPLETED
Start: 2019-08-21 | End: 2019-08-21

## 2019-08-21 RX ORDER — TRIAMCINOLONE ACETONIDE 40 MG/ML
INJECTION, SUSPENSION INTRA-ARTICULAR; INTRAMUSCULAR
Status: COMPLETED
Start: 2019-08-21 | End: 2019-08-21

## 2019-08-21 RX ORDER — BUPIVACAINE HYDROCHLORIDE 2.5 MG/ML
INJECTION, SOLUTION EPIDURAL; INFILTRATION; INTRACAUDAL
Status: COMPLETED
Start: 2019-08-21 | End: 2019-08-21

## 2019-08-21 RX ORDER — LIDOCAINE HYDROCHLORIDE 10 MG/ML
INJECTION, SOLUTION EPIDURAL; INFILTRATION; INTRACAUDAL; PERINEURAL
Status: DISCONTINUED
Start: 2019-08-21 | End: 2019-08-22 | Stop reason: HOSPADM

## 2019-08-21 RX ADMIN — IOHEXOL 3 ML: 240 INJECTION, SOLUTION INTRATHECAL; INTRAVASCULAR; INTRAVENOUS; ORAL at 14:15

## 2019-08-21 RX ADMIN — TRIAMCINOLONE ACETONIDE 80 MG: 40 INJECTION, SUSPENSION INTRA-ARTICULAR; INTRAMUSCULAR at 14:15

## 2019-08-21 RX ADMIN — MIDAZOLAM HYDROCHLORIDE 2 MG: 1 INJECTION, SOLUTION INTRAMUSCULAR; INTRAVENOUS at 14:31

## 2019-08-21 RX ADMIN — BUPIVACAINE HYDROCHLORIDE 5 ML: 2.5 INJECTION, SOLUTION EPIDURAL; INFILTRATION; INTRACAUDAL; PERINEURAL at 14:15

## 2019-08-21 NOTE — NON-PROVIDER
Current meds. See medication reconciliation form. Reviewed with pt. Pt denies taking ASA,other blood thinners or anti-inflammatories. Pt has a ride post-procedure (Ricky Alcala is ). Printed and verbal discharge instructions given to pt who verbalized understanding.

## 2019-08-22 ENCOUNTER — HOSPITAL ENCOUNTER (OUTPATIENT)
Dept: RADIOLOGY | Facility: MEDICAL CENTER | Age: 59
End: 2019-08-22
Attending: NEUROLOGICAL SURGERY

## 2019-08-22 ENCOUNTER — TELEPHONE (OUTPATIENT)
Dept: MEDICAL GROUP | Age: 59
End: 2019-08-22

## 2019-08-22 DIAGNOSIS — M54.50 ACUTE MIDLINE LOW BACK PAIN WITHOUT SCIATICA: ICD-10-CM

## 2019-08-22 DIAGNOSIS — I60.9 SUBARACHNOID HEMORRHAGE (HCC): ICD-10-CM

## 2019-08-22 ASSESSMENT — ENCOUNTER SYMPTOMS
BLURRED VISION: 0
WEAKNESS: 1
NECK PAIN: 1
DIZZINESS: 1
COUGH: 1
BACK PAIN: 1
MEMORY LOSS: 1
HEADACHES: 1
FALLS: 1

## 2019-08-23 ENCOUNTER — APPOINTMENT (OUTPATIENT)
Dept: PULMONOLOGY | Facility: HOSPICE | Age: 59
End: 2019-08-23
Payer: OTHER GOVERNMENT

## 2019-08-29 ENCOUNTER — APPOINTMENT (OUTPATIENT)
Dept: PULMONOLOGY | Facility: HOSPICE | Age: 59
End: 2019-08-29
Payer: OTHER GOVERNMENT

## 2019-09-02 NOTE — PROCEDURES
ASSISTANT: None  PREOPERATIVE DIAGNOSIS: Bilateral Degenerative sacroiliitis .  POSTOPERATIVE DIAGNOSIS: Bilateral Degenerative sacroiliitis .  PROCEDURE PERFORMED:  1. Bilateral SI joint injection with a corticosteroid and a local anesthetic.  2. Fluoroscopy for precise needle placement.    Site: [facility]  ANESTHESIA: Local infiltration with 1% lidocaine. with conscious sedation  Anesthesia time: 14 minutes    MONITORS: Automatic blood pressure cuff and pulse oximetry.  INDICATIONS: + mallory, SI tenderness  MEDICATIONS:  (Please note that the patient’s anticoagulant and/or aspirin were held appropriately)  REVIEW OF SYSTEMS: Negative for fever, chills, chest pain, SOB, bleeding abnormalities, nausea, vomiting, diarrhea, worsening edema, or new rashes.  FOCUSED PHYSICAL EXAMINATION: The patient is awake, alert and oriented, and is in no acute distress. Vital signs are stable. The patient is afebrile. The rest of the PE is essentially unchanged from the patient’s recent visit to our office.  We explained the procedure to the patient including the risks, benefits and alternatives to the procedure. The patient verbalized understanding and was willing to proceed.  PROCEDURE IN DETAIL: An informed consent was obtained. The patient was taken to the procedure room where the patient was positively identified by the staff and the attending physician. The patient was positioned prone on the bed. The patient’s vital signs were monitored as above and remained stable throughout the procedure. A fluoroscopic view of the left SI joint was obtained. The skin was prepped and draped in the standard sterile fashion. A surgical pause (time-out) was performed and was agreed upon by the members of the team. The skin and subcutaneous tissues were infiltrated with 1% lidocaine using a 25-gauge, 1½-inch needle. A 22-gauge, 3.5-inch spinal needle was advanced into the inferior portion of the left SI joint. The position of the needle was  verified in AP and lateral views and by injecting radiopaque dye, isoview-200, 0.5ml, which showed excellent spread of dye into the joint. After negative aspiration for the CSF or blood, 40 mg of kenalog mixed with 1 ml 0.25 Bupivacaine was injected. The needle was withdrawn. The patient tolerated the procedure well. The patient was transferred in stable condition to the recovery room. The same procedure repeated on the right. Total Steroid was 80 mg kenalog.  COMPLICATIONS: None.  PAIN SCORE BEFORE THE PROCEDURE:  PAIN SCORE AFTER THE PROCEDURE:  DISPOSITION:  1. Discharge to home when the discharge criteria are met.  2. Return to the Pain Clinic in 2-4 weeks or sooner if needed.  3. (The patient will resume his/her medication)  4. (The patient will monitor their blood glucose over the next week as instructed and was advised to contact us if the reading is greater than 250mg/dL or if they develop any signs or symptoms of hyperglycemia.)

## 2019-09-06 NOTE — THERAPY
"Occupational Therapy Evaluation completed.   Functional Status: Supv supine > EOB, supv LB dressing, supv transfers with FWW, supv toileting, supv standing grooming   Plan of Care: Patient with no further skilled OT needs in the acute care setting at this time  Discharge Recommendations:  Equipment: Shower Chair (family agrees to obtain through [facility] - pt may need script). Post-acute therapy Currently anticipate no further skilled therapy needs once patient is discharged from the inpatient setting.    See \"Rehab Therapy-Acute\" Patient Summary Report for complete documentation.    59 y.o. female with h/o HTN, HLD, chronic back pain, active smoker, recent work-up for headache with findings of sphenoid sinusitis, 2mm saccular aneurysm. Pt referred to OP neuro. Presents now with malaise, dizziness, HA. Dx with COLT. Pt seen for OT eval. Pt lives with son, daughter who assist with all I-ADL, transportation, supervise standing bathing. Family reports pt is not left alone in house. Pt would benefit from shower chair. Family agrees to obtain from [facility]. Pt appears to be at baseline function from OT standpoint. No further acute OT needs. Family is interested in HH RN if possible to monitor medications, potential side effects.     "
"Physical Therapy Evaluation completed.   Bed Mobility:  Supine to Sit: Supervised  Transfers: Sit to Stand: Modified Independent  Gait: Level Of Assist: Supervised with Front-Wheel Walker       Plan of Care: Patient with no further skilled PT needs in the acute care setting at this time  Discharge Recommendations: Equipment: No Equipment Needed. Post-acute therapy Currently anticipate no further skilled therapy needs once patient is discharged from the inpatient setting.    See \"Rehab Therapy-Acute\" Patient Summary Report for complete documentation.     "
upper/lower/lower 2 teeth

## 2019-09-11 ENCOUNTER — APPOINTMENT (OUTPATIENT)
Dept: PHYSICAL THERAPY | Facility: REHABILITATION | Age: 59
End: 2019-09-11
Attending: NEUROLOGICAL SURGERY
Payer: MEDICARE

## 2019-09-16 ENCOUNTER — APPOINTMENT (OUTPATIENT)
Dept: PHYSICAL THERAPY | Facility: REHABILITATION | Age: 59
End: 2019-09-16
Payer: MEDICARE

## 2019-09-18 ENCOUNTER — APPOINTMENT (OUTPATIENT)
Dept: PHYSICAL THERAPY | Facility: REHABILITATION | Age: 59
End: 2019-09-18
Payer: MEDICARE

## 2019-09-23 ENCOUNTER — APPOINTMENT (OUTPATIENT)
Dept: PHYSICAL THERAPY | Facility: REHABILITATION | Age: 59
End: 2019-09-23
Payer: MEDICARE

## 2019-09-24 ENCOUNTER — HOSPITAL ENCOUNTER (EMERGENCY)
Facility: MEDICAL CENTER | Age: 59
End: 2019-09-24
Attending: EMERGENCY MEDICINE
Payer: MEDICARE

## 2019-09-24 ENCOUNTER — APPOINTMENT (OUTPATIENT)
Dept: RADIOLOGY | Facility: MEDICAL CENTER | Age: 59
End: 2019-09-24
Attending: EMERGENCY MEDICINE
Payer: MEDICARE

## 2019-09-24 VITALS
WEIGHT: 145.28 LBS | DIASTOLIC BLOOD PRESSURE: 75 MMHG | SYSTOLIC BLOOD PRESSURE: 137 MMHG | RESPIRATION RATE: 16 BRPM | BODY MASS INDEX: 24.8 KG/M2 | TEMPERATURE: 97.4 F | HEIGHT: 64 IN | HEART RATE: 84 BPM | OXYGEN SATURATION: 94 %
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
## 2019-09-24 DIAGNOSIS — S90.32XA CONTUSION OF LEFT FOOT, INITIAL ENCOUNTER: ICD-10-CM

## 2019-09-24 DIAGNOSIS — S80.02XA CONTUSION OF LEFT KNEE, INITIAL ENCOUNTER: ICD-10-CM

## 2019-09-24 PROCEDURE — 96372 THER/PROPH/DIAG INJ SC/IM: CPT

## 2019-09-24 PROCEDURE — 99284 EMERGENCY DEPT VISIT MOD MDM: CPT

## 2019-09-24 PROCEDURE — 700111 HCHG RX REV CODE 636 W/ 250 OVERRIDE (IP): Performed by: EMERGENCY MEDICINE

## 2019-09-24 PROCEDURE — 73562 X-RAY EXAM OF KNEE 3: CPT | Mod: LT

## 2019-09-24 PROCEDURE — 73630 X-RAY EXAM OF FOOT: CPT | Mod: LT

## 2019-09-24 RX ORDER — KETOROLAC TROMETHAMINE 30 MG/ML
30 INJECTION, SOLUTION INTRAMUSCULAR; INTRAVENOUS ONCE
Status: COMPLETED | OUTPATIENT
Start: 2019-09-24 | End: 2019-09-24

## 2019-09-24 RX ADMIN — KETOROLAC TROMETHAMINE 30 MG: 30 INJECTION, SOLUTION INTRAMUSCULAR; INTRAVENOUS at 16:49

## 2019-09-24 NOTE — ED PROVIDER NOTES
ED Provider Note  CHIEF COMPLAINT  Chief Complaint   Patient presents with   • Fall     Fell yesterday down three steps and landed on left knee, denies head injury or LOC.       PORSHA Skaggs is a 59 y.o. female who presents with left knee and left foot pain after a fall down 3 steps.  She landed directly on her left knee.  She is having pain with ambulation and bending her knee.  She is having pain diffusely in her knee.  She denies any hip pain.  She denies any head neck or back pain.  She did not lose consciousness.  She denies any numbness tingling or weakness in her leg.  She denies any coolness.  She is also having some slight pain and swelling to the top of her left foot.  She also has an abrasion to her foot and her knee.  She is able to ambulate.    REVIEW OF SYSTEMS  Positive for left knee and left foot pain and abrasion, Negative for numbness, tingling, weakness, coolness, ankle pain, hip pain, back pain, neck pain, headache, head trauma.    PAST MEDICAL HISTORY   has a past medical history of Aneurysm of cavernous portion of left internal carotid artery (04/2019), Anxiety (1993), Chronic back pain, Constipation (9/27/2013), Dental disorder, Hyperlipidemia, Hypertension, Impaired physical mobility (9/30/2013), Lung nodule < 6cm on CT (12/2015), MEDICAL HOME (6/2015), Psychiatric problem, Sleep apnea, and Snoring.    SOCIAL HISTORY  Social History     Tobacco Use   • Smoking status: Current Every Day Smoker     Packs/day: 0.10     Years: 37.00     Pack years: 3.70     Types: Cigarettes   • Smokeless tobacco: Never Used   • Tobacco comment: 4 cig/day with chantix down to 2 cig/day since 7/1/19   Substance and Sexual Activity   • Alcohol use: No   • Drug use: No   • Sexual activity: Never     Partners: Male     Birth control/protection: Surgical       SURGICAL HISTORY   has a past surgical history that includes tubal coagulation laparoscopic bilateral; appendectomy; appendectomy child (1974); cervical  "disk and fusion anterior (11/16/2010); cervical fusion posterior (6/19/2012); and colonoscopy with polyp (4/1/13).    CURRENT MEDICATIONS  Reviewed.  See Encounter Summary.      ALLERGIES  Allergies   Allergen Reactions   • Cymbalta [Duloxetine Hcl] Rash and Itching     Redness to anterior neck.       PHYSICAL EXAM  VITAL SIGNS: /59   Pulse 78   Temp 36.4 °C (97.5 °F) (Temporal)   Resp 16   Ht 1.626 m (5' 4\")   Wt 65.9 kg (145 lb 4.5 oz)   SpO2 92%   BMI 24.94 kg/m²   Constitutional: Well developed, Well nourished, No acute distress, Non-toxic appearance.   Cardiovascular: Good pulses on the affected extremity. Good capillary refill.  Thorax & Lungs: No respiratory distress  Skin: Superficial linear abrasion to the dorsum of the left foot and small linear abrasion to the medial aspect of the knee.  Musculoskeletal: There is some swelling and ecchymosis to the dorsum of the foot as well as the knee.  Patient is able to range the knee but with pain.  No evidence of dislocation.  No obvious deformity.  Normal range of motion of the hip and ankle.  Neurologic: Good sensation to light touch on the distal affected extremity.    Radiology  DX-KNEE 3 VIEWS LEFT   Final Result      Negative knee series aside from quadriceps enthesopathy and popliteal atherosclerotic calcified plaque.      DX-FOOT-COMPLETE 3+ LEFT   Final Result      No radiographic evidence of acute displaced fracture or subluxation.            COURSE & MEDICAL DECISION MAKING  Nursing notes and vital signs were reviewed. (See chart for details)    The patient presents to the Emergency Department with left knee and foot pain after a fall.  Neurovascularly intact.  Differential includes fracture versus contusion.  No history to suggest dislocation.  Able to ambulate on the foot and knee.  No history of head trauma or loss of consciousness.    Advised patient of x-ray results.  Patient has been ambulating around the ER.  She now requests pain " medication and Toradol will be given.  She is advised to ice and elevate her sore extremities.  She is advised to follow-up with orthopedics or her regular doctor if her pain continues.  Patient understands her follow-up plan.    Discharge Medications:     The patient verbally agreed to the discharge precautions and follow-up plan which is documented in EPIC.    FINAL IMPRESSION  1. Fall, initial encounter     2. Contusion of left knee, initial encounter     3. Contusion of left foot, initial encounter

## 2019-09-24 NOTE — ED NOTES
ERP at bedside. Pt agrees with plan of care discussed by ERP. AIDET acknowledged with patient. Luc in low position, side rail up for pt safety. Call light within reach. Will continue to monitor.

## 2019-09-24 NOTE — DISCHARGE INSTRUCTIONS
Take Tylenol or ibuprofen for your pain.  Ice your knee and elevate.  If he continued to have pain follow-up with the orthopedic specialist.  Any numbness coolness weakness or concerns return.

## 2019-09-24 NOTE — ED NOTES
"Pt yelling out \" when is my turn for x-ray, I was here before the other lady (pt).\" pt was instructed that patients were seen according their level of sickness. Pt telling the nurse that \"youre rude for taking the other patient first\" pt was notified she will be taken to X-ray soon. Pt continues to complain about how long she had to wait for X-ray. LAST used.  "

## 2019-09-24 NOTE — ED TRIAGE NOTES
"Chief Complaint   Patient presents with   • Fall     Fell yesterday down three steps and landed on left knee, denies head injury or LOC.     /59   Pulse 78   Temp 36.4 °C (97.5 °F) (Temporal)   Resp 16   Ht 1.626 m (5' 4\")   Wt 65.9 kg (145 lb 4.5 oz)   SpO2 92%   BMI 24.94 kg/m²     "

## 2019-09-24 NOTE — ED NOTES
Medicinal interventions carried out per ERP orders.  Crutches provided to pt with verbal understanding and return demonstration.

## 2019-09-25 ENCOUNTER — APPOINTMENT (OUTPATIENT)
Dept: PHYSICAL THERAPY | Facility: REHABILITATION | Age: 59
End: 2019-09-25
Payer: MEDICARE

## 2019-09-25 NOTE — ED NOTES
Discharge instructions provided.  Pt verbalized the understanding of discharge instructions to follow up with PCP and to return to ER if condition worsens.  Pt ambulated out of ER without difficulty with crutches.

## 2019-09-30 ENCOUNTER — APPOINTMENT (OUTPATIENT)
Dept: PHYSICAL THERAPY | Facility: REHABILITATION | Age: 59
End: 2019-09-30
Payer: MEDICARE

## 2019-10-01 ENCOUNTER — OFFICE VISIT (OUTPATIENT)
Dept: PULMONOLOGY | Facility: HOSPICE | Age: 59
End: 2019-10-01
Payer: MEDICARE

## 2019-10-01 VITALS
BODY MASS INDEX: 23.73 KG/M2 | SYSTOLIC BLOOD PRESSURE: 178 MMHG | WEIGHT: 139 LBS | HEIGHT: 64 IN | DIASTOLIC BLOOD PRESSURE: 82 MMHG | HEART RATE: 82 BPM | OXYGEN SATURATION: 94 % | RESPIRATION RATE: 16 BRPM

## 2019-10-01 DIAGNOSIS — I10 ESSENTIAL HYPERTENSION: ICD-10-CM

## 2019-10-01 DIAGNOSIS — R93.89 NONSPECIFIC ABNORMAL FINDINGS ON RADIOLOGICAL AND EXAMINATION OF INTRATHORACIC ORGANS: ICD-10-CM

## 2019-10-01 DIAGNOSIS — Z72.0 TOBACCO USE: ICD-10-CM

## 2019-10-01 PROCEDURE — 99204 OFFICE O/P NEW MOD 45 MIN: CPT | Performed by: INTERNAL MEDICINE

## 2019-10-01 RX ORDER — ENALAPRIL MALEATE 20 MG/1
TABLET ORAL
Qty: 90 TAB | Refills: 0 | Status: SHIPPED | OUTPATIENT
Start: 2019-10-01 | End: 2019-12-05 | Stop reason: SDUPTHER

## 2019-10-01 RX ORDER — PREDNISONE 10 MG/1
TABLET ORAL
Qty: 18 TAB | Refills: 0 | Status: SHIPPED | OUTPATIENT
Start: 2019-10-01 | End: 2019-11-06

## 2019-10-01 NOTE — PROGRESS NOTES
Chief Complaint   Patient presents with   • Follow-Up     Pulm evaluation       HPI: This patient is a 59 y.o. Female who is referred by Dr. Hunt, PCP, for abnormal chest CAT scan.  She is been an active smoker and had incidental finding of a left upper lobe 4 mm noncalcified pulmonary nodule on chest CAT scan in .  She was encouraged by her PCP to have a follow-up chest CAT scan however had significant financial stressors and could not immediately follow through.  She ultimately had her chest CAT scan on 2019 which showed diffuse, scattered groundglass opacities with an upper lobe predominance.  No solid nodules or lymphadenopathy appreciated.  She was treated empirically with Z-Kareem.  She denies any respiratory symptomatology.  Specifically denies hemoptysis, wheezing, chest tightness, dyspnea.  She has periodic cough, denies sputum purulence.  Denies fevers, chills, night sweats or weight loss.  She denies any past pulmonary history.    Her father  of lung cancer.    Past Medical History:   Diagnosis Date   • Aneurysm of cavernous portion of left internal carotid artery 2019   • Anxiety 1993    Dr. Freeman   • Chronic back pain    • Constipation 2013   • Dental disorder     dentures   • Hyperlipidemia    • Hypertension    • Impaired physical mobility 2013   • Lung nodule < 6cm on CT 2015   • MEDICAL HOME 2015   • Psychiatric problem     depression   • Sleep apnea    • Snoring        Social History     Socioeconomic History   • Marital status: Legally      Spouse name: Not on file   • Number of children: Not on file   • Years of education: Not on file   • Highest education level: Not on file   Occupational History   • Occupation: [employer]     Employer: [employer]   Social Needs   • Financial resource strain: Not on file   • Food insecurity:     Worry: Not on file     Inability: Not on file   • Transportation needs:     Medical: Not on file     Non-medical:  Not on file   Tobacco Use   • Smoking status: Current Some Day Smoker     Packs/day: 0.10     Years: 37.00     Pack years: 3.70     Types: Cigarettes   • Smokeless tobacco: Never Used   • Tobacco comment: she has n ot smoked in 2 weeks   Substance and Sexual Activity   • Alcohol use: No   • Drug use: No   • Sexual activity: Never     Partners: Male     Birth control/protection: Surgical   Lifestyle   • Physical activity:     Days per week: Not on file     Minutes per session: Not on file   • Stress: Not on file   Relationships   • Social connections:     Talks on phone: Not on file     Gets together: Not on file     Attends Sabianism service: Not on file     Active member of club or organization: Not on file     Attends meetings of clubs or organizations: Not on file     Relationship status: Not on file   • Intimate partner violence:     Fear of current or ex partner: Not on file     Emotionally abused: Not on file     Physically abused: Not on file     Forced sexual activity: Not on file   Other Topics Concern   • Not on file   Social History Narrative    ** Merged History Encounter **            Family History   Problem Relation Age of Onset   • Cancer Mother         lung cancer   • Heart Disease Father         MI   • Diabetes Father    • Genitourinary () Problems Father         renal failure on dialysis   • Diabetes Sister    • Genitourinary () Problems Sister         renal failure   • Diabetes Brother        Current Outpatient Medications on File Prior to Visit   Medication Sig Dispense Refill   • GABAPENTIN PO Take  by mouth.     • atorvastatin (LIPITOR) 20 MG Tab Take 1 Tab by mouth every evening. 90 Tab 1   • Nutritional Supplements (ENSURE) Liquid 1 can by mouth per day 30 Can 5   • albuterol 108 (90 Base) MCG/ACT Aero Soln inhalation aerosol Inhale 2 Puffs by mouth every 6 hours as needed for Shortness of Breath. 8.5 g 1   • sertraline (ZOLOFT) 25 MG tablet Take 25 mg by mouth every bedtime.  0   •  "diazepam (VALIUM) 10 MG tablet Take 10 mg by mouth 1 time daily as needed for Anxiety or Sleep.     • oxyCODONE immediate release (ROXICODONE) 10 MG immediate release tablet Take 10 mg by mouth every 6 hours as needed for Moderate Pain.     • SUMAtriptan (IMITREX) 50 MG Tab Take 50 mg by mouth 1 time daily as needed for Migraine.     • morphine ER (MS CONTIN) 15 MG Tab CR tablet Take 15 mg by mouth every day.  0   • enalapril (VASOTEC) 20 MG tablet Take 1 Tab by mouth every day. 90 Tab 1   • Misc. Devices Misc walker 1 Device 0   • vitamin D, Ergocalciferol, (DRISDOL) 36499 units Cap capsule Take 1 Cap by mouth every 7 days. (Patient not taking: Reported on 10/1/2019) 12 Cap 0     No current facility-administered medications on file prior to visit.        Allergies: Cymbalta [duloxetine hcl]    ROS:   Constitutional: Denies fevers, chills, night sweats, fatigue or weight loss  Eyes: Denies vision loss, pain, drainage, double vision  Ears, Nose, Throat: Denies earache, difficulty hearing, tinnitus, nasal congestion, hoarseness  Cardiovascular: Denies chest pain, tightness, palpitations, orthopnea or edema  Respiratory:As in HPI  Sleep: Denies daytime sleepiness, snoring, apneas, insomnia, morning headaches  GI: Denies heartburn, dysphagia, nausea, abdominal pain, diarrhea or constipation  : Denies frequent urination, hematuria, discharge or painful urination  Musculoskeletal: +back pain, painful joints, sore muscles  Neurological: Denies weakness,+headaches  Skin: No rashes    BP (!) 178/82 (BP Location: Left arm, Patient Position: Sitting, BP Cuff Size: Adult)   Pulse 82   Resp 16   Ht 1.626 m (5' 4\")   Wt 63 kg (139 lb)   SpO2 94%     Physical Exam:  Appearance: Well-nourished, well-developed, in no acute distress  HEENT: Normocephalic, atraumatic, white sclera, PERRLA, oropharynx clear  Neck: No adenopathy or masses  Respiratory: no intercostal retractions or accessory muscle use  Lungs auscultation: Clear " to auscultation bilaterally, diminished BS  Cardiovascular: Regular rate rhythm. No murmurs, rubs or gallops.  No LE edema  Abdomen: soft, nondistended  Gait: Normal  Digits: No clubbing, cyanosis  Motor: No focal deficits  Orientation: Oriented to time, person and place    Diagnosis:  1. Nonspecific abnormal findings on radiological and examination of intrathoracic organs  CT-CHEST (THORAX) W/O    predniSONE (DELTASONE) 10 MG Tab   2. Tobacco use         Plan:  The patient has incidental finding of scattered groundglass opacities on chest CAT scan which may represent atypical infection, hypersensitivity pneumonitis, inflammation (ILD) which can be related to active smoking, less likely malignancy.  She is asymptomatic.  Smoking cessation counseling provided-she has essentially quit over the past 2 weeks which was encouraged.  Recommend empiric treatment with short course of oral steroids, starting at 30 mg prednisone daily, tapered by 10 mg every 5 days.  We will update chest CAT scan for follow-up.  If the groundglass opacities persists then further diagnostic work-up with serology, PFT's and bronchoscopy would be warranted.  Due to severe financial stressors, she is unable to repeat her CAT scan at this time.  We will arrange for follow-up scan within the next 3 months.  Return in about 4 months (around 2/1/2020) for at lung nodule clinic.

## 2019-10-02 ENCOUNTER — APPOINTMENT (OUTPATIENT)
Dept: PHYSICAL THERAPY | Facility: REHABILITATION | Age: 59
End: 2019-10-02
Payer: COMMERCIAL

## 2019-10-12 DIAGNOSIS — I10 ESSENTIAL HYPERTENSION: ICD-10-CM

## 2019-10-14 RX ORDER — AMLODIPINE BESYLATE 5 MG/1
TABLET ORAL
Qty: 30 TAB | Refills: 0 | Status: SHIPPED | OUTPATIENT
Start: 2019-10-14 | End: 2019-11-06

## 2019-10-15 ENCOUNTER — APPOINTMENT (OUTPATIENT)
Dept: MEDICAL GROUP | Facility: PHYSICIAN GROUP | Age: 59
End: 2019-10-15
Payer: OTHER GOVERNMENT

## 2019-11-06 DIAGNOSIS — Z01.812 PRE-OPERATIVE LABORATORY EXAMINATION: ICD-10-CM

## 2019-11-06 LAB
ANION GAP SERPL CALC-SCNC: 6 MMOL/L (ref 0–11.9)
BUN SERPL-MCNC: 8 MG/DL (ref 8–22)
CALCIUM SERPL-MCNC: 9.2 MG/DL (ref 8.5–10.5)
CHLORIDE SERPL-SCNC: 106 MMOL/L (ref 96–112)
CO2 SERPL-SCNC: 28 MMOL/L (ref 20–33)
CREAT SERPL-MCNC: 0.68 MG/DL (ref 0.5–1.4)
GLUCOSE SERPL-MCNC: 63 MG/DL (ref 65–99)
POTASSIUM SERPL-SCNC: 3.8 MMOL/L (ref 3.6–5.5)
SODIUM SERPL-SCNC: 140 MMOL/L (ref 135–145)

## 2019-11-06 PROCEDURE — 80048 BASIC METABOLIC PNL TOTAL CA: CPT

## 2019-11-06 RX ORDER — OXYCODONE HYDROCHLORIDE 10 MG/1
10 TABLET ORAL EVERY 4 HOURS PRN
COMMUNITY
End: 2021-12-16

## 2019-11-06 RX ORDER — GABAPENTIN 800 MG/1
800 TABLET ORAL 3 TIMES DAILY
COMMUNITY
End: 2021-04-16 | Stop reason: SDUPTHER

## 2019-11-06 RX ORDER — SERTRALINE HYDROCHLORIDE 100 MG/1
100 TABLET, FILM COATED ORAL EVERY EVENING
Refills: 1 | COMMUNITY
Start: 2019-10-07 | End: 2020-04-07 | Stop reason: SDUPTHER

## 2019-11-06 RX ORDER — CYCLOBENZAPRINE HCL 10 MG
10 TABLET ORAL PRN
Refills: 0 | COMMUNITY
Start: 2019-08-27 | End: 2019-12-05 | Stop reason: SDUPTHER

## 2019-11-06 NOTE — OR NURSING
"Preadmit appointment: \" Preparing for your Procedure information\" sheet given to patient with verbal and written instructions. Patient instructed to continue prescribed medications through the day before surgery, instructed to take the following medications the day of surgery per anesthesia protocol:  Albuterol PRN, Flexeril PRN, Neurontin, MS Contin.   Pt states, \"no issues with anesthesia\".  New Fasting guidelines handout given to Pt, Pt instructed to hydrate well today.  Pt was very sleepy during Pre- Op  Interview.  All Pt's questions and concerns answered.  "

## 2019-11-07 ENCOUNTER — APPOINTMENT (OUTPATIENT)
Dept: RADIOLOGY | Facility: MEDICAL CENTER | Age: 59
End: 2019-11-07
Attending: PAIN MEDICINE
Payer: MEDICARE

## 2019-11-07 ENCOUNTER — HOSPITAL ENCOUNTER (OUTPATIENT)
Facility: MEDICAL CENTER | Age: 59
End: 2019-11-07
Attending: PAIN MEDICINE | Admitting: PAIN MEDICINE
Payer: MEDICARE

## 2019-11-07 ENCOUNTER — ANESTHESIA EVENT (OUTPATIENT)
Dept: SURGERY | Facility: MEDICAL CENTER | Age: 59
End: 2019-11-07
Payer: MEDICARE

## 2019-11-07 ENCOUNTER — ANESTHESIA (OUTPATIENT)
Dept: SURGERY | Facility: MEDICAL CENTER | Age: 59
End: 2019-11-07
Payer: MEDICARE

## 2019-11-07 VITALS
OXYGEN SATURATION: 91 % | DIASTOLIC BLOOD PRESSURE: 66 MMHG | WEIGHT: 139.99 LBS | BODY MASS INDEX: 23.9 KG/M2 | HEIGHT: 64 IN | SYSTOLIC BLOOD PRESSURE: 111 MMHG | TEMPERATURE: 98.8 F | HEART RATE: 73 BPM | RESPIRATION RATE: 16 BRPM

## 2019-11-07 DIAGNOSIS — Z01.812 PRE-OPERATIVE LABORATORY EXAMINATION: Primary | ICD-10-CM

## 2019-11-07 DIAGNOSIS — G89.4 CHRONIC PAIN SYNDROME: ICD-10-CM

## 2019-11-07 PROCEDURE — L8699 PROSTHETIC IMPLANT NOS: HCPCS | Performed by: PAIN MEDICINE

## 2019-11-07 PROCEDURE — 502000 HCHG MISC OR IMPLANTS RC 0278: Performed by: PAIN MEDICINE

## 2019-11-07 PROCEDURE — 160036 HCHG PACU - EA ADDL 30 MINS PHASE I: Performed by: PAIN MEDICINE

## 2019-11-07 PROCEDURE — 700101 HCHG RX REV CODE 250: Performed by: PAIN MEDICINE

## 2019-11-07 PROCEDURE — 160046 HCHG PACU - 1ST 60 MINS PHASE II: Performed by: PAIN MEDICINE

## 2019-11-07 PROCEDURE — 72080 X-RAY EXAM THORACOLMB 2/> VW: CPT

## 2019-11-07 PROCEDURE — 700101 HCHG RX REV CODE 250

## 2019-11-07 PROCEDURE — 501838 HCHG SUTURE GENERAL: Performed by: PAIN MEDICINE

## 2019-11-07 PROCEDURE — 700101 HCHG RX REV CODE 250: Performed by: ANESTHESIOLOGY

## 2019-11-07 PROCEDURE — 500448 HCHG DRESSING, TELFA 3X4: Performed by: PAIN MEDICINE

## 2019-11-07 PROCEDURE — 700105 HCHG RX REV CODE 258: Performed by: PAIN MEDICINE

## 2019-11-07 PROCEDURE — 160002 HCHG RECOVERY MINUTES (STAT): Performed by: PAIN MEDICINE

## 2019-11-07 PROCEDURE — 700111 HCHG RX REV CODE 636 W/ 250 OVERRIDE (IP): Performed by: ANESTHESIOLOGY

## 2019-11-07 PROCEDURE — 160041 HCHG SURGERY MINUTES - EA ADDL 1 MIN LEVEL 4: Performed by: PAIN MEDICINE

## 2019-11-07 PROCEDURE — 160048 HCHG OR STATISTICAL LEVEL 1-5: Performed by: PAIN MEDICINE

## 2019-11-07 PROCEDURE — 500449 HCHG DRESSING, TELFA 8X10: Performed by: PAIN MEDICINE

## 2019-11-07 PROCEDURE — 500423 HCHG DRESSING, ABD COMBINE: Performed by: PAIN MEDICINE

## 2019-11-07 PROCEDURE — 502240 HCHG MISC OR SUPPLY RC 0272: Performed by: PAIN MEDICINE

## 2019-11-07 PROCEDURE — 160029 HCHG SURGERY MINUTES - 1ST 30 MINS LEVEL 4: Performed by: PAIN MEDICINE

## 2019-11-07 PROCEDURE — 160025 RECOVERY II MINUTES (STATS): Performed by: PAIN MEDICINE

## 2019-11-07 PROCEDURE — 160009 HCHG ANES TIME/MIN: Performed by: PAIN MEDICINE

## 2019-11-07 PROCEDURE — A6402 STERILE GAUZE <= 16 SQ IN: HCPCS | Performed by: PAIN MEDICINE

## 2019-11-07 PROCEDURE — 700111 HCHG RX REV CODE 636 W/ 250 OVERRIDE (IP): Performed by: PAIN MEDICINE

## 2019-11-07 PROCEDURE — 160035 HCHG PACU - 1ST 60 MINS PHASE I: Performed by: PAIN MEDICINE

## 2019-11-07 PROCEDURE — C1778 LEAD, NEUROSTIMULATOR: HCPCS | Performed by: PAIN MEDICINE

## 2019-11-07 PROCEDURE — 302699 HCHG ABDOMINAL BINDER: Performed by: PAIN MEDICINE

## 2019-11-07 PROCEDURE — C1822 GEN, NEURO, HF, RECHG BAT: HCPCS | Performed by: PAIN MEDICINE

## 2019-11-07 DEVICE — IMPLANTABLE DEVICE: Type: IMPLANTABLE DEVICE | Site: BACK | Status: FUNCTIONAL

## 2019-11-07 RX ORDER — SODIUM CHLORIDE, SODIUM LACTATE, POTASSIUM CHLORIDE, CALCIUM CHLORIDE 600; 310; 30; 20 MG/100ML; MG/100ML; MG/100ML; MG/100ML
INJECTION, SOLUTION INTRAVENOUS CONTINUOUS
Status: DISCONTINUED | OUTPATIENT
Start: 2019-11-07 | End: 2019-11-07 | Stop reason: HOSPADM

## 2019-11-07 RX ORDER — IPRATROPIUM BROMIDE AND ALBUTEROL SULFATE 2.5; .5 MG/3ML; MG/3ML
3 SOLUTION RESPIRATORY (INHALATION)
Status: DISCONTINUED | OUTPATIENT
Start: 2019-11-07 | End: 2019-11-07 | Stop reason: HOSPADM

## 2019-11-07 RX ORDER — BACITRACIN 50000 [IU]/1
INJECTION, POWDER, FOR SOLUTION INTRAMUSCULAR
Status: DISCONTINUED | OUTPATIENT
Start: 2019-11-07 | End: 2019-11-07 | Stop reason: HOSPADM

## 2019-11-07 RX ORDER — MEPERIDINE HYDROCHLORIDE 25 MG/ML
12.5 INJECTION INTRAMUSCULAR; INTRAVENOUS; SUBCUTANEOUS
Status: DISCONTINUED | OUTPATIENT
Start: 2019-11-07 | End: 2019-11-07 | Stop reason: HOSPADM

## 2019-11-07 RX ORDER — ONDANSETRON 2 MG/ML
INJECTION INTRAMUSCULAR; INTRAVENOUS PRN
Status: DISCONTINUED | OUTPATIENT
Start: 2019-11-07 | End: 2019-11-07 | Stop reason: SURG

## 2019-11-07 RX ORDER — NEOSTIGMINE METHYLSULFATE 1 MG/ML
INJECTION, SOLUTION INTRAVENOUS PRN
Status: DISCONTINUED | OUTPATIENT
Start: 2019-11-07 | End: 2019-11-07 | Stop reason: SURG

## 2019-11-07 RX ORDER — CEFAZOLIN SODIUM 1 G/3ML
INJECTION, POWDER, FOR SOLUTION INTRAMUSCULAR; INTRAVENOUS PRN
Status: DISCONTINUED | OUTPATIENT
Start: 2019-11-07 | End: 2019-11-07 | Stop reason: SURG

## 2019-11-07 RX ORDER — BUPIVACAINE HYDROCHLORIDE 2.5 MG/ML
INJECTION, SOLUTION EPIDURAL; INFILTRATION; INTRACAUDAL
Status: DISCONTINUED | OUTPATIENT
Start: 2019-11-07 | End: 2019-11-07 | Stop reason: HOSPADM

## 2019-11-07 RX ORDER — DEXMEDETOMIDINE HYDROCHLORIDE 100 UG/ML
INJECTION, SOLUTION INTRAVENOUS PRN
Status: DISCONTINUED | OUTPATIENT
Start: 2019-11-07 | End: 2019-11-07 | Stop reason: SURG

## 2019-11-07 RX ORDER — ROCURONIUM BROMIDE 10 MG/ML
INJECTION, SOLUTION INTRAVENOUS PRN
Status: DISCONTINUED | OUTPATIENT
Start: 2019-11-07 | End: 2019-11-07 | Stop reason: SURG

## 2019-11-07 RX ORDER — MIDAZOLAM HYDROCHLORIDE 1 MG/ML
INJECTION INTRAMUSCULAR; INTRAVENOUS PRN
Status: DISCONTINUED | OUTPATIENT
Start: 2019-11-07 | End: 2019-11-07 | Stop reason: SURG

## 2019-11-07 RX ORDER — DIPHENHYDRAMINE HYDROCHLORIDE 50 MG/ML
12.5 INJECTION INTRAMUSCULAR; INTRAVENOUS
Status: DISCONTINUED | OUTPATIENT
Start: 2019-11-07 | End: 2019-11-07 | Stop reason: HOSPADM

## 2019-11-07 RX ORDER — LIDOCAINE HYDROCHLORIDE 40 MG/ML
SOLUTION TOPICAL PRN
Status: DISCONTINUED | OUTPATIENT
Start: 2019-11-07 | End: 2019-11-07 | Stop reason: SURG

## 2019-11-07 RX ORDER — ONDANSETRON 2 MG/ML
4 INJECTION INTRAMUSCULAR; INTRAVENOUS
Status: DISCONTINUED | OUTPATIENT
Start: 2019-11-07 | End: 2019-11-07 | Stop reason: HOSPADM

## 2019-11-07 RX ORDER — HYDROMORPHONE HYDROCHLORIDE 2 MG/ML
0.2 INJECTION, SOLUTION INTRAMUSCULAR; INTRAVENOUS; SUBCUTANEOUS
Status: DISCONTINUED | OUTPATIENT
Start: 2019-11-07 | End: 2019-11-07 | Stop reason: HOSPADM

## 2019-11-07 RX ADMIN — SODIUM CHLORIDE, POTASSIUM CHLORIDE, SODIUM LACTATE AND CALCIUM CHLORIDE: 600; 310; 30; 20 INJECTION, SOLUTION INTRAVENOUS at 07:00

## 2019-11-07 RX ADMIN — ALFENTANIL HYDROCHLORIDE 2000 MCG: 500 INJECTION, SOLUTION INTRAVENOUS at 08:20

## 2019-11-07 RX ADMIN — NEOSTIGMINE METHYLSULFATE 2 MG: 1 INJECTION INTRAVENOUS at 09:57

## 2019-11-07 RX ADMIN — LIDOCAINE HYDROCHLORIDE 4 ML: 40 SOLUTION TOPICAL at 08:20

## 2019-11-07 RX ADMIN — GLYCOPYRROLATE 0.4 MG: 0.2 INJECTION INTRAMUSCULAR; INTRAVENOUS at 09:57

## 2019-11-07 RX ADMIN — ROCURONIUM BROMIDE 20 MG: 10 INJECTION, SOLUTION INTRAVENOUS at 08:20

## 2019-11-07 RX ADMIN — DEXMEDETOMIDINE HYDROCHLORIDE 10 MCG: 100 INJECTION, SOLUTION INTRAVENOUS at 09:20

## 2019-11-07 RX ADMIN — PROPOFOL 40 MG: 10 INJECTION, EMULSION INTRAVENOUS at 09:20

## 2019-11-07 RX ADMIN — GLYCOPYRROLATE 0.2 MG: 0.2 INJECTION INTRAMUSCULAR; INTRAVENOUS at 08:16

## 2019-11-07 RX ADMIN — CEFAZOLIN 2 G: 1 INJECTION, POWDER, FOR SOLUTION INTRAVENOUS at 08:17

## 2019-11-07 RX ADMIN — MIDAZOLAM HYDROCHLORIDE 2 MG: 1 INJECTION, SOLUTION INTRAMUSCULAR; INTRAVENOUS at 08:16

## 2019-11-07 RX ADMIN — ONDANSETRON 4 MG: 2 INJECTION INTRAMUSCULAR; INTRAVENOUS at 08:16

## 2019-11-07 RX ADMIN — PROPOFOL 80 MG: 10 INJECTION, EMULSION INTRAVENOUS at 08:20

## 2019-11-07 RX ADMIN — DEXMEDETOMIDINE HYDROCHLORIDE 10 MCG: 100 INJECTION, SOLUTION INTRAVENOUS at 08:36

## 2019-11-07 RX ADMIN — EPHEDRINE SULFATE 10 MG: 50 INJECTION INTRAMUSCULAR; INTRAVENOUS; SUBCUTANEOUS at 09:41

## 2019-11-07 RX ADMIN — LIDOCAINE HYDROCHLORIDE 0.5 ML: 10 INJECTION, SOLUTION INFILTRATION; PERINEURAL at 06:55

## 2019-11-07 RX ADMIN — DEXMEDETOMIDINE HYDROCHLORIDE 10 MCG: 100 INJECTION, SOLUTION INTRAVENOUS at 08:20

## 2019-11-07 RX ADMIN — EPHEDRINE SULFATE 5 MG: 50 INJECTION INTRAMUSCULAR; INTRAVENOUS; SUBCUTANEOUS at 09:36

## 2019-11-07 RX ADMIN — ROCURONIUM BROMIDE 5 MG: 10 INJECTION, SOLUTION INTRAVENOUS at 09:22

## 2019-11-07 RX ADMIN — FENTANYL CITRATE 25 MCG: 0.05 INJECTION, SOLUTION INTRAMUSCULAR; INTRAVENOUS at 11:08

## 2019-11-07 RX ADMIN — EPHEDRINE SULFATE 5 MG: 50 INJECTION INTRAMUSCULAR; INTRAVENOUS; SUBCUTANEOUS at 09:39

## 2019-11-07 ASSESSMENT — PAIN SCALES - GENERAL: PAIN_LEVEL: 0

## 2019-11-07 NOTE — OR NURSING
1009-arrived PACU. Pt sleeping, arouses to verbal stim.  VSS spontaneous resp rhonchi throughout. Clears with coughing.  abd binder in place. O2 3LNC.   1025- vss. Pt tolerates sips water denies pain or nausea. Dsg dry intact scant drainage to thoracic spine area CDI to L flank.   1050-report called to Yvonne MERCEDES.  SaO2 fluctuates 88-91%. Sensor changed to ear with improved wave form noted. 93-94%.  1108- medicated for c/o pain 10/10 mid back.   1120- pt admits pain tolerable now.  States she is ready to get up in chair and see family.  Vss. dsg to mid thoracic spine area unchanged. dsg L flank CDI. Ice pack in place. Tolerates po flds w/o n/v.  1125- report updated and pt transferred to April RN

## 2019-11-07 NOTE — ANESTHESIA TIME REPORT
Anesthesia Start and Stop Event Times     Date Time Event    11/7/2019 0742 Ready for Procedure     0809 Anesthesia Start     1011 Anesthesia Stop        Responsible Staff  11/07/19    Name Role Begin End    Leander Carlos Jr., M.D. Anesth 0809 1011        Preop Diagnosis (Free Text):  Pre-op Diagnosis     LUMBAR SPONDYLOSIS, CHRONIC PAIN        Preop Diagnosis (Codes):    Post op Diagnosis  Chronic neck pain with history of cervical spinal surgery      Premium Reason  Non-Premium    Comments:

## 2019-11-07 NOTE — OR NURSING
"1130 patient to stage 2  Patient settled in recliner chair post short ambulation from [facility] - pt dressed with assist by CNA. Dressing to mid upper thoracic area has 30% serosanguinous drainage but dressing intact, Dressing to L flank has 5% serosanguionus drainage shadowing noted and intact. Pt reports pain as tolerable and denies nausea; requesting coffee. Abdominal binder in place.   1144 Pt sitting up and talking with family and drinking coffee.   1210 Pulse ox 90% and greater at rest. Pt states \"ready to go\". D/Karthik to care of family post uneventful stay in PACU 2.    "

## 2019-11-07 NOTE — ANESTHESIA POSTPROCEDURE EVALUATION
Patient: Elina Skaggs    Procedure Summary     Date:  11/07/19 Room / Location:  [place] [address] / SURGERY [facility]    Anesthesia Start:  0809 Anesthesia Stop:  1011    Procedure:  INSERTION, NEUROSTIMULATOR, PERMANENT, SPINAL CORD (N/A Back) Diagnosis:  (LUMBAR SPONDYLOSIS, CHRONIC PAIN)    Surgeon:  Checo Aldrich M.D. Responsible Provider:  Leander Carlos Jr., M.D.    Anesthesia Type:  general ASA Status:  3          Final Anesthesia Type: general  Last vitals  BP   NIBP: 121/83    Temp   36.4 °C (97.5 °F)    Pulse   Heart Rate (Monitored): 79   Resp   18    SpO2   93 %      Anesthesia Post Evaluation    Patient location during evaluation: PACU  Patient participation: complete - patient participated  Level of consciousness: awake and alert  Pain score: 0    Airway patency: patent  Anesthetic complications: no  Cardiovascular status: hemodynamically stable  Respiratory status: acceptable and nasal cannula  Hydration status: euvolemic    PONV: none           Nurse Pain Score: 8 (NPRS)

## 2019-11-07 NOTE — ANESTHESIA PREPROCEDURE EVALUATION
Relevant Problems   CARDIAC   (+) HTN (hypertension)      Other   (+) Anxiety   (+) Cervical stenosis of spinal canal   (+) Chronic low back pain   (+) Cigarette nicotine dependence without complication   (+) Depression   (+) Dyslipidemia   (+) Smoker       Physical Exam    Airway   Mallampati: II  TM distance: <3 FB  Neck ROM: limited       Cardiovascular   Rhythm: regular  Rate: normal     Dental   (+) upper dentures  Comments: Poor dentition       Pulmonary   (+) rhonchi, wheezes     Abdominal    Neurological          chronic narc use, chronic back and neck pain, chronic migraines    Anesthesia Plan    ASA 3 (intracranial aneurysm, narc dependence)   ASA physical status 3 criteria: COPD and other (comment)    Plan - general       Airway plan will be ETT        Induction: intravenous          Informed Consent:    Anesthetic plan and risks discussed with patient.

## 2019-11-07 NOTE — ANESTHESIA QCDR
2019 [facility] Clinical Data Registry (for Quality Improvement)     Postoperative nausea/vomiting risk protocol (Adult = 18 yrs and Pediatric 3-17 yrs)- (430 and 463)  General inhalation anesthetic (NOT TIVA) with PONV risk factors: No  Provision of anti-emetic therapy with at least 2 different classes of agents: N/A  Patient DID NOT receive anti-emetic therapy and reason is documented in Medical Record: N/A    Multimodal Pain Management- (AQI59)  Patient undergoing Elective Surgery (i.e. Outpatient, or ASC, or Prescheduled Surgery prior to Hospital Admission): Yes  Use of Multimodal Pain Management, two or more drugs and/or interventions, NOT including systemic opioids: Yes   Exception: Documented allergy to multiple classes of analgesics:  N/A    PACU assessment of acute postoperative pain prior to Anesthesia Care End- Applies to Patients Age = 18- (ABG7)  Initial PACU pain score is which of the following: < 7/10  Patient unable to report pain score: N/A    Post-anesthetic transfer of care checklist/protocol to PACU/ICU- (426 and 427)  Upon conclusion of case, patient transferred to which of the following locations: PACU/Non-ICU  Use of transfer checklist/protocol: Yes  Exclusion: Service Performed in Patient Hospital Room (and thus did not require transfer): N/A    PACU Reintubation- (AQI31)  General anesthesia requiring endotracheal intubation (ETT) along with subsequent extubation in OR or PACU: Yes  Required reintubation in the PACU: No   Extubation was a planned trial documented in the medical record prior to removal of the original airway device:  N/A    Unplanned admission to ICU related to anesthesia service up through end of PACU care- (MD51)  Unplanned admission to ICU (not initially anticipated at anesthesia start time): No

## 2019-11-07 NOTE — DISCHARGE INSTRUCTIONS
ACTIVITY: Rest and take it easy for the first 24 hours.  A responsible adult is recommended to remain with you during that time.  It is normal to feel sleepy.  We encourage you to not do anything that requires balance, judgment or coordination.    MILD FLU-LIKE SYMPTOMS ARE NORMAL. YOU MAY EXPERIENCE GENERALIZED MUSCLE ACHES, THROAT IRRITATION, HEADACHE AND/OR SOME NAUSEA.    FOR 24 HOURS DO NOT:  Drive, operate machinery or run household appliances.  Drink beer or alcoholic beverages.   Make important decisions or sign legal documents.    SPECIAL INSTRUCTIONS: Take antibiotics as prescribed for 14 days.    DIET: To avoid nausea, slowly advance diet as tolerated, avoiding spicy or greasy foods for the first day.  Add more substantial food to your diet according to your physician's instructions.  Babies can be fed formula or breast milk as soon as they are hungry.  INCREASE FLUIDS AND FIBER TO AVOID CONSTIPATION.    SURGICAL DRESSING/BATHING: keep dressing clean dry and in place for 72 hours. Then may remove and shower.    FOLLOW-UP APPOINTMENT:  A follow-up appointment should be arranged with your doctor in 10-14 days; call to schedule.    You should CALL YOUR PHYSICIAN if you develop:  Fever greater than 101 degrees F.  Pain not relieved by medication, or persistent nausea or vomiting.  Excessive bleeding (blood soaking through dressing) or unexpected drainage from the wound.  Extreme redness or swelling around the incision site, drainage of pus or foul smelling drainage.  Inability to urinate or empty your bladder within 8 hours.  Problems with breathing or chest pain.    You should call 911 if you develop problems with breathing or chest pain.  If you are unable to contact your doctor or surgical center, you should go to the nearest emergency room or urgent care center.  Physician's telephone #: [phone]    If any questions arise, call your doctor.  If your doctor is not available, please feel free to call  the Surgical Center at {Surgical Dept Numbers:[id number]}.  The Center is open Monday through Friday from 7AM to 7PM.  You can also call the HEALTH HOTLINE open 24 hours/day, 7 days/week and speak to a nurse at [phone], or toll free at [phone].    A registered nurse may call you a few days after your surgery to see how you are doing after your procedure.    MEDICATIONS: Resume taking daily medication.  Take prescribed pain medication with food.  If no medication is prescribed, you may take non-aspirin pain medication if needed.  PAIN MEDICATION CAN BE VERY CONSTIPATING.  Take a stool softener or laxative such as senokot, pericolace, or milk of magnesia if needed.    Prescription given for Keflex. No oral pain medication given in recovery, may take prescribed medication as needed. .    If your physician has prescribed pain medication that includes Acetaminophen (Tylenol), do not take additional Acetaminophen (Tylenol) while taking the prescribed medication.    Depression / Suicide Risk    As you are discharged from this [facility] facility, it is important to learn how to keep safe from harming yourself.    Recognize the warning signs:  · Abrupt changes in personality, positive or negative- including increase in energy   · Giving away possessions  · Change in eating patterns- significant weight changes-  positive or negative  · Change in sleeping patterns- unable to sleep or sleeping all the time   · Unwillingness or inability to communicate  · Depression  · Unusual sadness, discouragement and loneliness  · Talk of wanting to die  · Neglect of personal appearance   · Rebelliousness- reckless behavior  · Withdrawal from people/activities they love  · Confusion- inability to concentrate     If you or a loved one observes any of these behaviors or has concerns about self-harm, here's what you can do:  · Talk about it- your feelings and reasons for harming yourself  · Remove any means that you might use to  hurt yourself (examples: pills, rope, extension cords, firearm)  · Get professional help from the community (Mental Health, Substance Abuse, psychological counseling)  · Do not be alone:Call your Safe Contact- someone whom you trust who will be there for you.  · Call your local CRISIS HOTLINE 222-9904 or 637-752-9055  · Call your local Children's Mobile Crisis Response Team [place] (934) 284-1933 or www.[employer]  · Call the toll free National Suicide Prevention Hotlines   · National Suicide Prevention Lifeline 747-122-VISX (7167)  · National Hope Line Network 800-SUICIDE (330-4385)

## 2019-12-05 ENCOUNTER — OFFICE VISIT (OUTPATIENT)
Dept: MEDICAL GROUP | Facility: MEDICAL CENTER | Age: 59
End: 2019-12-05
Payer: MEDICARE

## 2019-12-05 ENCOUNTER — TELEPHONE (OUTPATIENT)
Dept: MEDICAL GROUP | Facility: PHYSICIAN GROUP | Age: 59
End: 2019-12-05

## 2019-12-05 VITALS
SYSTOLIC BLOOD PRESSURE: 170 MMHG | DIASTOLIC BLOOD PRESSURE: 90 MMHG | WEIGHT: 144 LBS | BODY MASS INDEX: 24.59 KG/M2 | HEART RATE: 87 BPM | RESPIRATION RATE: 16 BRPM | HEIGHT: 64 IN | OXYGEN SATURATION: 95 % | TEMPERATURE: 97.6 F

## 2019-12-05 DIAGNOSIS — J98.01 ACUTE BRONCHOSPASM: ICD-10-CM

## 2019-12-05 DIAGNOSIS — M54.50 CHRONIC MIDLINE LOW BACK PAIN WITHOUT SCIATICA: ICD-10-CM

## 2019-12-05 DIAGNOSIS — F17.200 TOBACCO DEPENDENCE: ICD-10-CM

## 2019-12-05 DIAGNOSIS — R93.89 ABNORMAL CHEST CT: ICD-10-CM

## 2019-12-05 DIAGNOSIS — Z12.11 COLON CANCER SCREENING: ICD-10-CM

## 2019-12-05 DIAGNOSIS — M48.02 CERVICAL STENOSIS OF SPINAL CANAL: ICD-10-CM

## 2019-12-05 DIAGNOSIS — F33.41 RECURRENT MAJOR DEPRESSIVE DISORDER, IN PARTIAL REMISSION (HCC): ICD-10-CM

## 2019-12-05 DIAGNOSIS — I10 ESSENTIAL HYPERTENSION: ICD-10-CM

## 2019-12-05 DIAGNOSIS — J45.40 MODERATE PERSISTENT ASTHMA WITHOUT COMPLICATION: ICD-10-CM

## 2019-12-05 DIAGNOSIS — I67.1 ANEURYSM OF CAVERNOUS PORTION OF LEFT INTERNAL CAROTID ARTERY: ICD-10-CM

## 2019-12-05 DIAGNOSIS — E78.5 DYSLIPIDEMIA: ICD-10-CM

## 2019-12-05 DIAGNOSIS — G89.29 CHRONIC MIDLINE LOW BACK PAIN WITHOUT SCIATICA: ICD-10-CM

## 2019-12-05 PROCEDURE — 99204 OFFICE O/P NEW MOD 45 MIN: CPT | Performed by: FAMILY MEDICINE

## 2019-12-05 RX ORDER — FLUTICASONE PROPIONATE 44 UG/1
2 AEROSOL, METERED RESPIRATORY (INHALATION) 2 TIMES DAILY
Qty: 1 INHALER | Refills: 3 | Status: SHIPPED | OUTPATIENT
Start: 2019-12-05 | End: 2020-07-19

## 2019-12-05 RX ORDER — ATORVASTATIN CALCIUM 20 MG/1
20 TABLET, FILM COATED ORAL EVERY EVENING
Qty: 90 TAB | Refills: 1 | Status: SHIPPED | OUTPATIENT
Start: 2019-12-05 | End: 2020-04-06 | Stop reason: SDUPTHER

## 2019-12-05 RX ORDER — GABAPENTIN 800 MG/1
800 TABLET ORAL DAILY
Status: CANCELLED | OUTPATIENT
Start: 2019-12-05

## 2019-12-05 RX ORDER — ALBUTEROL SULFATE 90 UG/1
2 AEROSOL, METERED RESPIRATORY (INHALATION) EVERY 6 HOURS PRN
Qty: 8.5 G | Refills: 1 | Status: SHIPPED | OUTPATIENT
Start: 2019-12-05 | End: 2020-04-08 | Stop reason: SDUPTHER

## 2019-12-05 RX ORDER — CYCLOBENZAPRINE HCL 10 MG
10 TABLET ORAL 2 TIMES DAILY PRN
Qty: 15 TAB | Refills: 1 | Status: SHIPPED | OUTPATIENT
Start: 2019-12-05 | End: 2020-07-19

## 2019-12-05 RX ORDER — ENALAPRIL MALEATE 20 MG/1
20 TABLET ORAL
Qty: 90 TAB | Refills: 3 | Status: SHIPPED | OUTPATIENT
Start: 2019-12-05 | End: 2021-03-30 | Stop reason: SDUPTHER

## 2019-12-05 NOTE — TELEPHONE ENCOUNTER
Patient called and needing her medications refilled but is no longer a patient of Dr. Hunt as she was dismissed from practice in October and has not looked for a new provider.

## 2019-12-06 NOTE — PROGRESS NOTES
CC: New patient: Hypertension, hyperlipidemia, depression, chronic back and neck pain, aneurysm of the left internal carotid artery at cavernous sinuses, asthma    HPI:  Elina presents today to establish new PCP.    Patient has been active and independent with all ADLs.  Has the following chronic medical issues.     Essential hypertension  Today patient has high blood pressure.  Patient ran out of blood pressure medication, and here for blood pressure medication refill.  She has been on enalapril 20 mg daily.  Currently denies any headache, chest pain, shortness of breath.    Dyslipidemia  She has been tolerating the statin. Denies muscle pain LFTs has been normal, has been on atorvastatin 20 mg daily.  No history of diabetes, CAD, or stroke.    Recurrent major depressive disorder, in partial remission (HCC)  Patient's mood has been stable, however sometimes it fluctuates.  She is currently on sertraline 100 mg daily for both depression and anxiety.  Denies any suicidal ideation.  She has been following up with psychiatry.      Chronic midline low back pain without sciatica/chronic neck pain due to cervical spine stenosis  Patient underwent surgical treatment in both lower back and neck.  She has been following up with pain management.  She is currently on short acting oxycodone and long-acting morphine.  Spinal stimulator was inserted recently.    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  Patient was diagnosed in April 2019 after MRA of the brain was done regarding work-up for new onset headaches.she was seen by a neurologist and interventional radiologist, patient advised to repeat MRI in 6 months,    Moderate persistent asthma without complication/tobacco dependence/abnormal chest CT  Patient stated that she is symptomatic at night, and she has been using albuterol twice and sometimes during the day.  Patient smokes about 2 to 3 cigarettes a day for many  years.  Not ready to quit.  She had an abnormal CT of the chest, as follows:  Extensive hazy groundglass opacities throughout both lungs, most in the bilateral upper lungs. This is nonspecific and could be seen with atypical infection, mild pulmonary edema, hypersensitivity pneumonitis, cryptogenic organizing   pneumonia/eosinophilic pneumonia,etc..., No solid nodules identified. Previous 4 mm nodule in the medial left upper lobe appears groundglass on this exam.  Patient was seen by the pulmonologist who put her on oral steroids, advised the patient to be seen in February 2020.    Flu shot is up-to-date.  Patient is due for colonoscopy    Patient Active Problem List    Diagnosis Date Noted   • Closed fracture of right fibula 06/13/2015     Priority: High   • Cervical stenosis of spinal canal 06/11/2012     Priority: High   • HTN (hypertension) 06/13/2015     Priority: Medium   • Closed fracture of lumbar vertebra (HCC) 06/13/2015     Priority: Low   • Smoker 06/13/2015     Priority: Low   • Chronic pain 06/11/2012     Priority: Low   • Vitamin D deficiency 06/18/2019   • Encephalopathy 06/11/2019   • Hypotension 06/11/2019   • Leukopenia 06/11/2019   • Aneurysm of cavernous portion of left internal carotid artery 04/01/2019   • Cigarette nicotine dependence without complication 11/06/2018   • Impaired fasting blood sugar 02/10/2015   • Dyslipidemia 02/09/2015   • Chronic low back pain 11/19/2014   • Anxiety 11/19/2014   • DDD (degenerative disc disease) 06/09/2012   • Depression 06/09/2012       Current Outpatient Medications   Medication Sig Dispense Refill   • albuterol 108 (90 Base) MCG/ACT Aero Soln inhalation aerosol Inhale 2 Puffs by mouth every 6 hours as needed for Shortness of Breath. 8.5 g 1   • atorvastatin (LIPITOR) 20 MG Tab Take 1 Tab by mouth every evening. 90 Tab 1   • enalapril (VASOTEC) 20 MG tablet Take 1 Tab by mouth every day. 90 Tab 3   • cyclobenzaprine (FLEXERIL) 10 MG Tab Take 1 Tab by  mouth 2 times a day as needed. 15 Tab 1   • fluticasone (FLOVENT HFA) 44 MCG/ACT Aerosol Inhale 2 Puffs by mouth 2 times a day. 1 Inhaler 3   • gabapentin (NEURONTIN) 800 MG tablet Take 800 mg by mouth every day.     • sertraline (ZOLOFT) 100 MG Tab Take 100 mg by mouth every evening.  1   • oxyCODONE immediate release (ROXICODONE) 10 MG immediate release tablet Take 10 mg by mouth every four hours as needed for Moderate Pain.     • SUMAtriptan (IMITREX) 50 MG Tab Take 50 mg by mouth 1 time daily as needed for Migraine.     • morphine ER (MS CONTIN) 15 MG Tab CR tablet Take 15 mg by mouth every 12 hours.  0   • Misc. Devices Misc walker 1 Device 0   • Nutritional Supplements (ENSURE) Liquid 1 can by mouth per day 30 Can 5     No current facility-administered medications for this visit.          Allergies as of 12/05/2019 - Reviewed 12/05/2019   Allergen Reaction Noted   • Ambien [zolpidem]  11/06/2019   • Cymbalta [duloxetine hcl] Rash and Itching 06/12/2019        Social History     Socioeconomic History   • Marital status: Legally      Spouse name: Not on file   • Number of children: Not on file   • Years of education: Not on file   • Highest education level: Not on file   Occupational History   • Occupation: [employer]-     Employer: [employer]   Social Needs   • Financial resource strain: Not on file   • Food insecurity:     Worry: Not on file     Inability: Not on file   • Transportation needs:     Medical: Not on file     Non-medical: Not on file   Tobacco Use   • Smoking status: Current Some Day Smoker     Packs/day: 0.10     Years: 37.00     Pack years: 3.70     Types: Cigarettes   • Smokeless tobacco: Never Used   • Tobacco comment: she has n ot smoked in 2 weeks   Substance and Sexual Activity   • Alcohol use: No   • Drug use: No   • Sexual activity: Never     Partners: Male     Birth control/protection: Surgical   Lifestyle   • Physical activity:     Days per week: Not on file      Minutes per session: Not on file   • Stress: Not on file   Relationships   • Social connections:     Talks on phone: Not on file     Gets together: Not on file     Attends [employer] service: Not on file     Active member of club or organization: Not on file     Attends meetings of clubs or organizations: Not on file     Relationship status: Not on file   • Intimate partner violence:     Fear of current or ex partner: Not on file     Emotionally abused: Not on file     Physically abused: Not on file     Forced sexual activity: Not on file   Other Topics Concern   • Not on file   Social History Narrative    ** Merged History Encounter **            Family History   Problem Relation Age of Onset   • Cancer Mother         lung cancer   • Heart Disease Father         MI   • Diabetes Father    • Genitourinary () Problems Father         renal failure on dialysis   • Diabetes Sister    • Genitourinary () Problems Sister         renal failure   • Diabetes Brother        Past Surgical History:   Procedure Laterality Date   • PB IMPLANT NEUROSTIM/ N/A 11/7/2019    Procedure: INSERTION, NEUROSTIMULATOR, PERMANENT, SPINAL CORD;  Surgeon: Checo Aldrich M.D.;  Location: SURGERY [facility];  Service: Pain Management   • MASS EXCISION GENERAL Left 2017    Left upper back, Not malignant   • COLONOSCOPY WITH POLYP  4/1/13    performed by Dr. Kelley -sigmoid colon polyp-fragments of tubular adenoma   • CERVICAL FUSION POSTERIOR  6/19/2012    Performed by PAVEL KIM at SURGERY [facility]   • CERVICAL DISK AND FUSION ANTERIOR  11/16/2010    Performed by PAVEL KIM at [facility]   • APPENDECTOMY CHILD  1974   • APPENDECTOMY     • TUBAL COAGULATION LAPAROSCOPIC BILATERAL         ROS:  Denies any Headache, Blurred Vision, Confusion Chest pain,  Shortness of breath,  Abdominal pain, Changes of bowel or bladder, Lower ext edema, Fevers, Nights sweats, Weight Changes, Focal weakness or numbness.  All  "other systems are negative.    BP (!) 170/90 (BP Location: Right arm, Patient Position: Sitting, BP Cuff Size: Adult)   Pulse 87   Temp 36.4 °C (97.6 °F) (Temporal)   Resp 16   Ht 1.626 m (5' 4\")   Wt 65.3 kg (144 lb)   SpO2 95%   BMI 24.72 kg/m²     Physical Exam:  Gen:         Alert and oriented, No apparent distress.  HEENT:   Perrla, TM clear,  Oralpharynx no erythema or exudates.  Neck:       No Jugular venous distension, Lymphadenopathy, Thyromegaly, Bruits.  Lungs:     Clear to auscultation bilaterally  CV:          Regular rate and rhythm. No murmurs, rubs or gallops.  Abd:         Soft non tender, non distended. Normal active bowel sounds. No                                        Hepatosplenomegaly, No pulsatile masses.  Ext:          No clubbing, cyanosis, edema.      Assessment and Plan.   59 y.o. female     1. Essential hypertension  Elevated.  Patient ran out of blood pressure medication.  Continue on enalapril 20 mg daily.  Medication refilled  Advised to check her blood pressure twice a day for a week and send it to me for reevaluation.    - enalapril (VASOTEC) 20 MG tablet; Take 1 Tab by mouth every day.  Dispense: 90 Tab; Refill: 3    2. Dyslipidemia  He has been tolerating the statin. Denies muscle pain LFTs has been normal  Continue on atorvastatin 20 mg daily.  - atorvastatin (LIPITOR) 20 MG Tab; Take 1 Tab by mouth every evening.  Dispense: 90 Tab; Refill: 1    3. Recurrent major depressive disorder, in partial remission (HCC)  Patient has been doing fine on sertraline 100 mg daily  Continue follow-up with psychiatry..    4. Chronic midline low back pain without sciatica  Status post surgery.  Has a pain stimulator.  Has been on short acting oxycodone and long-acting morphine.  Continue follow-up with pain management.    5. Cervical stenosis of spinal canal  Status post surgery.  Has a pain stimulator.Has been on short acting oxycodone and long-acting morphine.  Continue follow-up with " pain management.    6. Aneurysm of cavernous portion of left internal carotid artery  Currently asymptomatic.  Was seen by a neurologist and interventional radiologist, patient advised to repeat MRI in 6 months,    7. Moderate persistent asthma without complication/Tobacco dependence/Abnormal chest CT  Symptomatic at night, uses albuterol twice a day.  We will add Flovent inhaler 2 puffs twice a day.  Advised to take albuterol as needed.  Smoking cessation discussed, patient not ready.  Abnormal chest CT finding in August 2019 was seen by a pulmonologist who put her on tapering dose of steroids, advised patient to see him back in February 2020.    - albuterol 108 (90 Base) MCG/ACT Aero Soln inhalation aerosol; Inhale 2 Puffs by mouth every 6 hours as needed for Shortness of Breath.  Dispense: 8.5 g; Refill: 1  - fluticasone (FLOVENT HFA) 44 MCG/ACT Aerosol; Inhale 2 Puffs by mouth 2 times a day.  Dispense: 1 Inhaler; Refill: 3    8. Colon cancer screening    - REFERRAL TO GI FOR COLONOSCOPY    9 . Health maintenance  Flu shot is up-to-date.  Patient is due for colonoscopy

## 2019-12-16 ENCOUNTER — TELEPHONE (OUTPATIENT)
Dept: MEDICAL GROUP | Facility: MEDICAL CENTER | Age: 59
End: 2019-12-16

## 2019-12-16 DIAGNOSIS — J45.40 MODERATE PERSISTENT ASTHMA WITHOUT COMPLICATION: ICD-10-CM

## 2019-12-16 NOTE — TELEPHONE ENCOUNTER
We need to know what insurance covers so we can prescribe medication.  However for now I will send Brio Ellipta to be taken daily.

## 2019-12-17 ENCOUNTER — TELEPHONE (OUTPATIENT)
Dept: MEDICAL GROUP | Facility: MEDICAL CENTER | Age: 59
End: 2019-12-17

## 2019-12-17 DIAGNOSIS — E78.5 DYSLIPIDEMIA: ICD-10-CM

## 2020-01-09 ENCOUNTER — TELEPHONE (OUTPATIENT)
Dept: MEDICAL GROUP | Facility: MEDICAL CENTER | Age: 60
End: 2020-01-09

## 2020-03-02 ENCOUNTER — TELEPHONE (OUTPATIENT)
Dept: PULMONOLOGY | Facility: HOSPICE | Age: 60
End: 2020-03-02

## 2020-03-02 NOTE — TELEPHONE ENCOUNTER
LVM informing patient to complete Chest CT before follow up appointment tomorrow 03/03/20     If patient is unable to complete before follow up tomorrow , pt has been informed to reschedule until it is completed.     Informed to call [phone] for imaging scheduling and [phone] to rescheduled appointment or any questions.

## 2020-04-06 DIAGNOSIS — E78.5 DYSLIPIDEMIA: ICD-10-CM

## 2020-04-06 RX ORDER — ATORVASTATIN CALCIUM 20 MG/1
20 TABLET, FILM COATED ORAL EVERY EVENING
Qty: 90 TAB | Refills: 1 | Status: ON HOLD | OUTPATIENT
Start: 2020-04-06 | End: 2020-10-26 | Stop reason: SDUPTHER

## 2020-04-07 DIAGNOSIS — J45.40 MODERATE PERSISTENT ASTHMA WITHOUT COMPLICATION: ICD-10-CM

## 2020-04-07 RX ORDER — SERTRALINE HYDROCHLORIDE 100 MG/1
100 TABLET, FILM COATED ORAL EVERY EVENING
Qty: 30 TAB | Refills: 1 | Status: SHIPPED | OUTPATIENT
Start: 2020-04-07 | End: 2020-04-07 | Stop reason: SDUPTHER

## 2020-04-07 RX ORDER — SERTRALINE HYDROCHLORIDE 100 MG/1
100 TABLET, FILM COATED ORAL EVERY EVENING
Qty: 30 TAB | Refills: 1 | Status: SHIPPED
Start: 2020-04-07 | End: 2020-10-28 | Stop reason: SDUPTHER

## 2020-04-08 ENCOUNTER — TELEPHONE (OUTPATIENT)
Dept: MEDICAL GROUP | Facility: MEDICAL CENTER | Age: 60
End: 2020-04-08

## 2020-04-08 ENCOUNTER — APPOINTMENT (OUTPATIENT)
Dept: MEDICAL GROUP | Facility: MEDICAL CENTER | Age: 60
End: 2020-04-08
Payer: COMMERCIAL

## 2020-04-08 DIAGNOSIS — J45.40 MODERATE PERSISTENT ASTHMA WITHOUT COMPLICATION: ICD-10-CM

## 2020-04-08 RX ORDER — ALBUTEROL SULFATE 90 UG/1
2 AEROSOL, METERED RESPIRATORY (INHALATION) EVERY 6 HOURS PRN
Qty: 8.5 G | Refills: 1 | Status: SHIPPED | OUTPATIENT
Start: 2020-04-08 | End: 2021-04-16 | Stop reason: SDUPTHER

## 2020-04-08 NOTE — TELEPHONE ENCOUNTER
----- Message from Cally Holden sent at 4/8/2020  8:45 AM PDT -----  Hello,    This patient has a telephone visit with you this am, however, she now has an [place] [employer]  Advantage plan, and we are out-of-network with that insurance. I called the patient and told her that we are out of network.  Patient said she has a cold and doesn't feel well and she was wondering if you would be willing to   Call in her prescriptions for her. She said she feels like she is dying.    Phone [phone]    Thank you,    Cally

## 2020-04-08 NOTE — TELEPHONE ENCOUNTER
I sent both the Breo and albuterol to the pharmacy.  However if patient's symptoms is severe shortness of breath she need to be seen at the urgent care for nebulizer treatment

## 2020-05-28 ENCOUNTER — PATIENT OUTREACH (OUTPATIENT)
Dept: SCHEDULING | Facility: IMAGING CENTER | Age: 60
End: 2020-05-28

## 2020-05-28 NOTE — PROGRESS NOTES
Outcome: Left Message    Please transfer to Patient Outreach Team at [phone] when patient returns call.      Attempt # 1

## 2020-06-18 NOTE — PROGRESS NOTES
Outcome: Left Message  Please transfer to Patient Outreach Team at [phone] when patient returns call.  Attempt # 2 AA

## 2020-06-30 NOTE — PROGRESS NOTES
Outcome: Left Message    Please transfer to Patient Outreach Team at [phone] when patient returns call.      Attempt # 3

## 2020-07-19 ENCOUNTER — APPOINTMENT (OUTPATIENT)
Dept: RADIOLOGY | Facility: MEDICAL CENTER | Age: 60
DRG: 206 | End: 2020-07-19
Attending: EMERGENCY MEDICINE
Payer: COMMERCIAL

## 2020-07-19 ENCOUNTER — HOSPITAL ENCOUNTER (INPATIENT)
Facility: MEDICAL CENTER | Age: 60
LOS: 2 days | DRG: 206 | End: 2020-07-21
Attending: EMERGENCY MEDICINE | Admitting: HOSPITALIST
Payer: COMMERCIAL

## 2020-07-19 DIAGNOSIS — R06.03 RESPIRATORY DISTRESS, ACUTE: ICD-10-CM

## 2020-07-19 DIAGNOSIS — R09.02 HYPOXIA: ICD-10-CM

## 2020-07-19 DIAGNOSIS — J18.9 PNEUMONIA OF BOTH LUNGS DUE TO INFECTIOUS ORGANISM, UNSPECIFIED PART OF LUNG: ICD-10-CM

## 2020-07-19 PROBLEM — R50.9 FEVER: Status: ACTIVE | Noted: 2020-07-19

## 2020-07-19 LAB
ALBUMIN SERPL BCP-MCNC: 3.9 G/DL (ref 3.2–4.9)
ALBUMIN/GLOB SERPL: 1.3 G/DL
ALP SERPL-CCNC: 86 U/L (ref 30–99)
ALT SERPL-CCNC: 20 U/L (ref 2–50)
AMPHET UR QL SCN: NEGATIVE
ANION GAP SERPL CALC-SCNC: 12 MMOL/L (ref 7–16)
APPEARANCE UR: ABNORMAL
AST SERPL-CCNC: 24 U/L (ref 12–45)
BACTERIA #/AREA URNS HPF: NEGATIVE /HPF
BARBITURATES UR QL SCN: NEGATIVE
BASOPHILS # BLD AUTO: 0.3 % (ref 0–1.8)
BASOPHILS # BLD: 0.03 K/UL (ref 0–0.12)
BENZODIAZ UR QL SCN: POSITIVE
BILIRUB SERPL-MCNC: 0.4 MG/DL (ref 0.1–1.5)
BILIRUB UR QL STRIP.AUTO: NEGATIVE
BLOOD CULTURE HOLD CXBCH: NORMAL
BUN SERPL-MCNC: 16 MG/DL (ref 8–22)
BZE UR QL SCN: NEGATIVE
CALCIUM SERPL-MCNC: 8.8 MG/DL (ref 8.5–10.5)
CANNABINOIDS UR QL SCN: NEGATIVE
CHLORIDE SERPL-SCNC: 103 MMOL/L (ref 96–112)
CO2 SERPL-SCNC: 24 MMOL/L (ref 20–33)
COLOR UR: YELLOW
COVID ORDER STATUS COVID19: NORMAL
CREAT SERPL-MCNC: 1.06 MG/DL (ref 0.5–1.4)
CRP SERPL HS-MCNC: 2.03 MG/DL (ref 0–0.75)
D DIMER PPP IA.FEU-MCNC: 0.99 UG/ML (FEU) (ref 0–0.5)
EOSINOPHIL # BLD AUTO: 0.03 K/UL (ref 0–0.51)
EOSINOPHIL NFR BLD: 0.3 % (ref 0–6.9)
EPI CELLS #/AREA URNS HPF: ABNORMAL /HPF
ERYTHROCYTE [DISTWIDTH] IN BLOOD BY AUTOMATED COUNT: 44.5 FL (ref 35.9–50)
FERRITIN SERPL-MCNC: 129 NG/ML (ref 10–291)
GLOBULIN SER CALC-MCNC: 2.9 G/DL (ref 1.9–3.5)
GLUCOSE SERPL-MCNC: 127 MG/DL (ref 65–99)
GLUCOSE UR STRIP.AUTO-MCNC: NEGATIVE MG/DL
HCT VFR BLD AUTO: 35.8 % (ref 37–47)
HGB BLD-MCNC: 11.3 G/DL (ref 12–16)
HYALINE CASTS #/AREA URNS LPF: ABNORMAL /LPF
IMM GRANULOCYTES # BLD AUTO: 0.04 K/UL (ref 0–0.11)
IMM GRANULOCYTES NFR BLD AUTO: 0.4 % (ref 0–0.9)
INR PPP: 1.03 (ref 0.87–1.13)
KETONES UR STRIP.AUTO-MCNC: ABNORMAL MG/DL
LACTATE BLD-SCNC: 1.8 MMOL/L (ref 0.5–2)
LDH SERPL L TO P-CCNC: 255 U/L (ref 107–266)
LEUKOCYTE ESTERASE UR QL STRIP.AUTO: ABNORMAL
LYMPHOCYTES # BLD AUTO: 0.7 K/UL (ref 1–4.8)
LYMPHOCYTES NFR BLD: 7.8 % (ref 22–41)
MCH RBC QN AUTO: 29.8 PG (ref 27–33)
MCHC RBC AUTO-ENTMCNC: 31.6 G/DL (ref 33.6–35)
MCV RBC AUTO: 94.5 FL (ref 81.4–97.8)
METHADONE UR QL SCN: NEGATIVE
MICRO URNS: ABNORMAL
MONOCYTES # BLD AUTO: 0.48 K/UL (ref 0–0.85)
MONOCYTES NFR BLD AUTO: 5.3 % (ref 0–13.4)
NEUTROPHILS # BLD AUTO: 7.71 K/UL (ref 2–7.15)
NEUTROPHILS NFR BLD: 85.9 % (ref 44–72)
NITRITE UR QL STRIP.AUTO: NEGATIVE
NRBC # BLD AUTO: 0 K/UL
NRBC BLD-RTO: 0 /100 WBC
NT-PROBNP SERPL IA-MCNC: 750 PG/ML (ref 0–125)
OPIATES UR QL SCN: POSITIVE
OXYCODONE UR QL SCN: POSITIVE
PCP UR QL SCN: NEGATIVE
PH UR STRIP.AUTO: 5 [PH] (ref 5–8)
PLATELET # BLD AUTO: 258 K/UL (ref 164–446)
PMV BLD AUTO: 9.7 FL (ref 9–12.9)
POTASSIUM SERPL-SCNC: 4.6 MMOL/L (ref 3.6–5.5)
PROCALCITONIN SERPL-MCNC: 1.05 NG/ML
PROPOXYPH UR QL SCN: NEGATIVE
PROT SERPL-MCNC: 6.8 G/DL (ref 6–8.2)
PROT UR QL STRIP: NEGATIVE MG/DL
PROTHROMBIN TIME: 13.8 SEC (ref 12–14.6)
RBC # BLD AUTO: 3.79 M/UL (ref 4.2–5.4)
RBC # URNS HPF: ABNORMAL /HPF
RBC UR QL AUTO: ABNORMAL
SARS-COV-2 RNA RESP QL NAA+PROBE: NOTDETECTED
SODIUM SERPL-SCNC: 139 MMOL/L (ref 135–145)
SP GR UR STRIP.AUTO: 1.02
SPECIMEN SOURCE: NORMAL
TROPONIN T SERPL-MCNC: 34 NG/L (ref 6–19)
UROBILINOGEN UR STRIP.AUTO-MCNC: 1 MG/DL
WBC # BLD AUTO: 9 K/UL (ref 4.8–10.8)
WBC #/AREA URNS HPF: ABNORMAL /HPF

## 2020-07-19 PROCEDURE — 700102 HCHG RX REV CODE 250 W/ 637 OVERRIDE(OP): Performed by: EMERGENCY MEDICINE

## 2020-07-19 PROCEDURE — C9803 HOPD COVID-19 SPEC COLLECT: HCPCS | Performed by: EMERGENCY MEDICINE

## 2020-07-19 PROCEDURE — 700102 HCHG RX REV CODE 250 W/ 637 OVERRIDE(OP): Performed by: HOSPITALIST

## 2020-07-19 PROCEDURE — 87040 BLOOD CULTURE FOR BACTERIA: CPT

## 2020-07-19 PROCEDURE — 99285 EMERGENCY DEPT VISIT HI MDM: CPT

## 2020-07-19 PROCEDURE — 96365 THER/PROPH/DIAG IV INF INIT: CPT

## 2020-07-19 PROCEDURE — 85610 PROTHROMBIN TIME: CPT

## 2020-07-19 PROCEDURE — 83605 ASSAY OF LACTIC ACID: CPT

## 2020-07-19 PROCEDURE — 83880 ASSAY OF NATRIURETIC PEPTIDE: CPT

## 2020-07-19 PROCEDURE — 770001 HCHG ROOM/CARE - MED/SURG/GYN PRIV*

## 2020-07-19 PROCEDURE — 85379 FIBRIN DEGRADATION QUANT: CPT

## 2020-07-19 PROCEDURE — 700105 HCHG RX REV CODE 258: Performed by: EMERGENCY MEDICINE

## 2020-07-19 PROCEDURE — 83615 LACTATE (LD) (LDH) ENZYME: CPT

## 2020-07-19 PROCEDURE — A9270 NON-COVERED ITEM OR SERVICE: HCPCS | Performed by: EMERGENCY MEDICINE

## 2020-07-19 PROCEDURE — 80053 COMPREHEN METABOLIC PANEL: CPT

## 2020-07-19 PROCEDURE — 84484 ASSAY OF TROPONIN QUANT: CPT

## 2020-07-19 PROCEDURE — 85025 COMPLETE CBC W/AUTO DIFF WBC: CPT

## 2020-07-19 PROCEDURE — 36415 COLL VENOUS BLD VENIPUNCTURE: CPT

## 2020-07-19 PROCEDURE — 99223 1ST HOSP IP/OBS HIGH 75: CPT | Mod: CS | Performed by: HOSPITALIST

## 2020-07-19 PROCEDURE — 84145 PROCALCITONIN (PCT): CPT

## 2020-07-19 PROCEDURE — 700111 HCHG RX REV CODE 636 W/ 250 OVERRIDE (IP): Performed by: EMERGENCY MEDICINE

## 2020-07-19 PROCEDURE — U0003 INFECTIOUS AGENT DETECTION BY NUCLEIC ACID (DNA OR RNA); SEVERE ACUTE RESPIRATORY SYNDROME CORONAVIRUS 2 (SARS-COV-2) (CORONAVIRUS DISEASE [COVID-19]), AMPLIFIED PROBE TECHNIQUE, MAKING USE OF HIGH THROUGHPUT TECHNOLOGIES AS DESCRIBED BY CMS-2020-01-R: HCPCS

## 2020-07-19 PROCEDURE — A9270 NON-COVERED ITEM OR SERVICE: HCPCS | Performed by: HOSPITALIST

## 2020-07-19 PROCEDURE — 82728 ASSAY OF FERRITIN: CPT

## 2020-07-19 PROCEDURE — 81001 URINALYSIS AUTO W/SCOPE: CPT

## 2020-07-19 PROCEDURE — 71045 X-RAY EXAM CHEST 1 VIEW: CPT

## 2020-07-19 PROCEDURE — 86140 C-REACTIVE PROTEIN: CPT

## 2020-07-19 PROCEDURE — 80307 DRUG TEST PRSMV CHEM ANLYZR: CPT

## 2020-07-19 PROCEDURE — 700105 HCHG RX REV CODE 258: Performed by: HOSPITALIST

## 2020-07-19 PROCEDURE — 87086 URINE CULTURE/COLONY COUNT: CPT

## 2020-07-19 RX ORDER — ONDANSETRON 4 MG/1
4 TABLET, ORALLY DISINTEGRATING ORAL EVERY 4 HOURS PRN
Status: DISCONTINUED | OUTPATIENT
Start: 2020-07-19 | End: 2020-07-21 | Stop reason: HOSPADM

## 2020-07-19 RX ORDER — CLONIDINE HYDROCHLORIDE 0.1 MG/1
0.1 TABLET ORAL 2 TIMES DAILY PRN
COMMUNITY
End: 2021-04-16 | Stop reason: SDUPTHER

## 2020-07-19 RX ORDER — ACETAMINOPHEN 325 MG/1
650 TABLET ORAL ONCE
Status: COMPLETED | OUTPATIENT
Start: 2020-07-19 | End: 2020-07-19

## 2020-07-19 RX ORDER — BISACODYL 10 MG
10 SUPPOSITORY, RECTAL RECTAL
Status: DISCONTINUED | OUTPATIENT
Start: 2020-07-19 | End: 2020-07-21 | Stop reason: HOSPADM

## 2020-07-19 RX ORDER — SERTRALINE HYDROCHLORIDE 100 MG/1
100 TABLET, FILM COATED ORAL EVERY EVENING
Status: DISCONTINUED | OUTPATIENT
Start: 2020-07-19 | End: 2020-07-21 | Stop reason: HOSPADM

## 2020-07-19 RX ORDER — POLYETHYLENE GLYCOL 3350 17 G/17G
1 POWDER, FOR SOLUTION ORAL
Status: DISCONTINUED | OUTPATIENT
Start: 2020-07-19 | End: 2020-07-21 | Stop reason: HOSPADM

## 2020-07-19 RX ORDER — PROMETHAZINE HYDROCHLORIDE 25 MG/1
12.5-25 SUPPOSITORY RECTAL EVERY 4 HOURS PRN
Status: DISCONTINUED | OUTPATIENT
Start: 2020-07-19 | End: 2020-07-21 | Stop reason: HOSPADM

## 2020-07-19 RX ORDER — PROCHLORPERAZINE EDISYLATE 5 MG/ML
5-10 INJECTION INTRAMUSCULAR; INTRAVENOUS EVERY 4 HOURS PRN
Status: DISCONTINUED | OUTPATIENT
Start: 2020-07-19 | End: 2020-07-21 | Stop reason: HOSPADM

## 2020-07-19 RX ORDER — PROMETHAZINE HYDROCHLORIDE 25 MG/1
12.5-25 TABLET ORAL EVERY 4 HOURS PRN
Status: DISCONTINUED | OUTPATIENT
Start: 2020-07-19 | End: 2020-07-21 | Stop reason: HOSPADM

## 2020-07-19 RX ORDER — NORTRIPTYLINE HYDROCHLORIDE 10 MG/1
10 CAPSULE ORAL
COMMUNITY
Start: 2020-07-06 | End: 2021-04-16 | Stop reason: SDUPTHER

## 2020-07-19 RX ORDER — ONDANSETRON 2 MG/ML
4 INJECTION INTRAMUSCULAR; INTRAVENOUS ONCE
Status: DISCONTINUED | OUTPATIENT
Start: 2020-07-19 | End: 2020-07-19 | Stop reason: ALTCHOICE

## 2020-07-19 RX ORDER — MORPHINE SULFATE 4 MG/ML
2 INJECTION, SOLUTION INTRAMUSCULAR; INTRAVENOUS ONCE
Status: DISCONTINUED | OUTPATIENT
Start: 2020-07-19 | End: 2020-07-19

## 2020-07-19 RX ORDER — SODIUM CHLORIDE 9 MG/ML
1000 INJECTION, SOLUTION INTRAVENOUS CONTINUOUS
Status: ACTIVE | OUTPATIENT
Start: 2020-07-19 | End: 2020-07-19

## 2020-07-19 RX ORDER — OXYCODONE HYDROCHLORIDE 5 MG/1
5-10 TABLET ORAL EVERY 4 HOURS PRN
Status: DISCONTINUED | OUTPATIENT
Start: 2020-07-19 | End: 2020-07-21 | Stop reason: HOSPADM

## 2020-07-19 RX ORDER — AMOXICILLIN 250 MG
2 CAPSULE ORAL 2 TIMES DAILY
Status: DISCONTINUED | OUTPATIENT
Start: 2020-07-20 | End: 2020-07-21 | Stop reason: HOSPADM

## 2020-07-19 RX ORDER — ONDANSETRON 2 MG/ML
4 INJECTION INTRAMUSCULAR; INTRAVENOUS EVERY 4 HOURS PRN
Status: DISCONTINUED | OUTPATIENT
Start: 2020-07-19 | End: 2020-07-21 | Stop reason: HOSPADM

## 2020-07-19 RX ORDER — SODIUM CHLORIDE, SODIUM LACTATE, POTASSIUM CHLORIDE, AND CALCIUM CHLORIDE .6; .31; .03; .02 G/100ML; G/100ML; G/100ML; G/100ML
500 INJECTION, SOLUTION INTRAVENOUS ONCE
Status: COMPLETED | OUTPATIENT
Start: 2020-07-19 | End: 2020-07-20

## 2020-07-19 RX ORDER — CYCLOBENZAPRINE HCL 10 MG
10 TABLET ORAL 2 TIMES DAILY PRN
COMMUNITY
End: 2021-04-16 | Stop reason: SDUPTHER

## 2020-07-19 RX ADMIN — SODIUM CHLORIDE 1000 ML: 9 INJECTION, SOLUTION INTRAVENOUS at 14:59

## 2020-07-19 RX ADMIN — CEFTRIAXONE SODIUM 2 G: 2 INJECTION, POWDER, FOR SOLUTION INTRAMUSCULAR; INTRAVENOUS at 14:52

## 2020-07-19 RX ADMIN — OXYCODONE 5 MG: 5 TABLET ORAL at 22:19

## 2020-07-19 RX ADMIN — ACETAMINOPHEN 650 MG: 325 TABLET, FILM COATED ORAL at 13:48

## 2020-07-19 RX ADMIN — SODIUM CHLORIDE, POTASSIUM CHLORIDE, SODIUM LACTATE AND CALCIUM CHLORIDE 500 ML: 600; 310; 30; 20 INJECTION, SOLUTION INTRAVENOUS at 19:40

## 2020-07-19 ASSESSMENT — FIBROSIS 4 INDEX
FIB4 SCORE: 1.29
FIB4 SCORE: 0.79

## 2020-07-19 ASSESSMENT — LIFESTYLE VARIABLES: DO YOU DRINK ALCOHOL: NO

## 2020-07-19 NOTE — ASSESSMENT & PLAN NOTE
Patient's hypoxia is concerning.  When I initially walked into the room the patient's oxygen saturation was 73%.  She has not had her oxygen on and an hour she says at this point I put the oxygen back on and she went up to 96% on 2 L by nasal cannula.  Continue with oxygen support and if necessary RT protocol will be initiated.  Patient is negative for COVID-19.

## 2020-07-19 NOTE — H&P
"Hospital Medicine History & Physical Note    Date of Service  7/19/2020    Primary Care Physician  Darrick Smart M.D.    Code Status  Prior    Chief Complaint  Chief Complaint   Patient presents with   • Fever     101.5   • Abdominal Pain     since this AM   • N/V   • Sore Throat       History of Presenting Illness  60 y.o. female who presented 7/19/2020 with confusion and fever.  Ms. Skaggs is a past medical history of hypertension as well as chronic pain syndrome is brought to the emergency room with fever, hypoxia, and confusion.  Emergency room, she is found to have 101.5 fever, bilateral opacifications on chest x-ray, normal white count with lymphopenia very suspicious for COVID though her COVID swab was negative.  I discussed with her daughter, Jo [phone] on the phone. She lives with her and she is not aware of any contacts that may have COVID. Jo states that her mother was hallucinating yesterday and today. She notes that her mother was \"shaking\" and she took her temperature which was 102.8 thus she took her down to the ER. In the ER, she was found to have sats 86%    I recommended to Jo that she and her family get COVID testing ASAP and self quarantine.   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    Surgical History   has a past surgical history that includes tubal coagulation laparoscopic bilateral; appendectomy; appendectomy child (1974); " colonoscopy with polyp (4/1/13); cervical disk and fusion anterior (11/16/2010); cervical fusion posterior (6/19/2012); mass excision general (Left, 2017); and pr implant neurostim/ (N/A, 11/7/2019).     Family History  family history includes Cancer in her mother; Diabetes in her brother, father, and sister; Genitourinary () Problems in her father and sister; Heart Disease in her father.     Social History   reports that she has been smoking cigarettes. She has a 3.70 pack-year smoking history. She has never used smokeless tobacco. She reports that she does not drink alcohol or use drugs.    Allergies  Allergies   Allergen Reactions   • Ambien [Zolpidem]      Sleep walking   • Cymbalta [Duloxetine Hcl] Rash and Itching     Redness to anterior neck.       Medications  Prior to Admission Medications   Prescriptions Last Dose Informant Patient Reported? Taking?   Fluticasone Furoate-Vilanterol (BREO ELLIPTA) 200-25 MCG/INH AEROSOL POWDER, BREATH ACTIVATED unknown at unknown Patient's Home Pharmacy No No   Sig: Inhale 1 Puff by mouth every day.   SUMAtriptan (IMITREX) 50 MG Tab unknown at unknown Patient's Home Pharmacy Yes No   Sig: Take 50 mg by mouth 1 time daily as needed for Migraine.   albuterol 108 (90 Base) MCG/ACT Aero Soln inhalation aerosol unknown at unknown Patient's Home Pharmacy No No   Sig: Inhale 2 Puffs by mouth every 6 hours as needed for Shortness of Breath.   atorvastatin (LIPITOR) 20 MG Tab unknown at unknown Patient's Home Pharmacy No No   Sig: Take 1 Tab by mouth every evening.   cloNIDine (CATAPRES) 0.1 MG Tab unknown at unknown Patient's Home Pharmacy Yes No   Sig: Take 0.1 mg by mouth 2 times a day as needed. SBP> 160  DBP> 90  Indications: High Blood Pressure Disorder   cyclobenzaprine (FLEXERIL) 10 MG Tab unknown at unknown Patient's Home Pharmacy Yes Yes   Sig: Take 10 mg by mouth 2 times a day as needed for Muscle Spasms.   enalapril (VASOTEC) 20 MG tablet unknown at unknown  Patient's Home Pharmacy No No   Sig: Take 1 Tab by mouth every day.   gabapentin (NEURONTIN) 800 MG tablet unknown at unknown Patient's Home Pharmacy Yes No   Sig: Take 800 mg by mouth 3 times a day.   nortriptyline (PAMELOR) 10 MG Cap unknown at unknown Patient's Home Pharmacy Yes No   Sig: Take 10 mg by mouth every bedtime.   oxyCODONE immediate release (ROXICODONE) 10 MG immediate release tablet unknown at unknown Patient's Home Pharmacy Yes No   Sig: Take 10 mg by mouth every four hours as needed for Moderate Pain.   sertraline (ZOLOFT) 100 MG Tab unknown at unknown Patient's Home Pharmacy No No   Sig: Take 1 Tab by mouth every evening.      Facility-Administered Medications: None       Physical Exam  Temp:  [37.1 °C (98.7 °F)-38.6 °C (101.5 °F)] 37.1 °C (98.7 °F)  Pulse:  [76-97] 76  Resp:  [12-18] 13  BP: ()/(54-76) 94/55  SpO2:  [86 %-98 %] 94 %    Physical Exam  Vitals signs and nursing note reviewed.   Constitutional:       Appearance: She is ill-appearing.      Comments: sleepy   HENT:      Head: Normocephalic and atraumatic.      Mouth/Throat:      Mouth: Mucous membranes are dry.      Pharynx: Oropharynx is clear.   Eyes:      General: No scleral icterus.     Conjunctiva/sclera: Conjunctivae normal.   Neck:      Musculoskeletal: Normal range of motion and neck supple.   Cardiovascular:      Rate and Rhythm: Normal rate and regular rhythm.   Pulmonary:      Effort: Pulmonary effort is normal. No respiratory distress.      Breath sounds: Normal breath sounds.   Abdominal:      General: There is no distension.      Tenderness: There is no abdominal tenderness.   Musculoskeletal:      Right lower leg: No edema.      Left lower leg: No edema.   Skin:     General: Skin is warm and dry.   Neurological:      General: No focal deficit present.      Comments: Oriented only to person   Psychiatric:      Comments: Sleepy, she is a poor historian         Laboratory:  Recent Labs     07/19/20  1257   WBC 9.0    RBC 3.79*   HEMOGLOBIN 11.3*   HEMATOCRIT 35.8*   MCV 94.5   MCH 29.8   MCHC 31.6*   RDW 44.5   PLATELETCT 258   MPV 9.7     Recent Labs     07/19/20  1257   SODIUM 139   POTASSIUM 4.6   CHLORIDE 103   CO2 24   GLUCOSE 127*   BUN 16   CREATININE 1.06   CALCIUM 8.8     Recent Labs     07/19/20  1257   ALTSGPT 20   ASTSGOT 24   ALKPHOSPHAT 86   TBILIRUBIN 0.4   GLUCOSE 127*     Recent Labs     07/19/20  1257   INR 1.03     Recent Labs     07/19/20  1257   NTPROBNP 750*         Recent Labs     07/19/20  1257   TROPONINT 34*       Imaging:  DX-CHEST-PORTABLE (1 VIEW)   Final Result      Borderline cardiomegaly perihilar opacification could be due to pulmonary edema, pneumonitis or hypoventilatory change.            Assessment/Plan:      * Hypoxia- (present on admission)  Assessment & Plan  Oxygen sats 86% on room air and she is not on oxygen at home.  Chest xray reveals bilateral opacifications suspicious for COVID  Supplemental oxygen  She was given one dose of IV Rocephin in the ER which will be continued though consider stopping it if procalcitonin is negative and/or repeat COVID is +.    Fever- (present on admission)  Assessment & Plan  Temp 101.5  High suspicion of COVID despite negative test: hypoxia, fever, normal WBC, lymphopenia, confusion, bilateral opacifications on chest xray.  Check D dimer, CRP, Ferritin, LDH.  Keep droplet isolation.   Repeat COVID test in 24 hours is appropriate.    Encephalopathy acute- (present on admission)  Assessment & Plan  Likely acute delirium secondary to infection though may be compounded by pain medications    Chronic low back pain- (present on admission)  Assessment & Plan  Obtain home med rec    Tobacco abuse- (present on admission)  Assessment & Plan  Nicotine patch ordered

## 2020-07-19 NOTE — ED NOTES
Med rec updated and complete. Allergies reviewed. Unable to interview pt art bedside at this time.  Unable to reach pt VIA phone.    Placed call to pts home pharmacy to verify last filled and picked up.      No antibiotics noted on file. Pt did receive on July 6 th 3 tablets of Morphine 15 mg (ER).  Medication not added to med rec.

## 2020-07-19 NOTE — ED TRIAGE NOTES
Brought straight back to rm 27 from the [facility]  Chief Complaint   Patient presents with   • Fever     101.5   • Abdominal Pain     since this AM   • N/V   • Sore Throat     Pt said all sx started this morning, vomited once today, denies feeling SOB, but her RA sat was 86%, place don 3L O2. Sepsis protocol initiated, COVID swab sent per orders.  Pt is AOx4, but also rambles and says things that don't make sense.

## 2020-07-19 NOTE — ED NOTES
COVID Swab obtained per Policy. Sent to lab for analysis.     Special isolation precautions are in place with signage outside this patient's room. This RN performs hand hygiene and enters the patient room wearing an appropriately fitting (N-95, PAPR, Aura) mask, face shield, protective gown, and gloves in order to provide nursing care. See nursing documentation for the care provided.

## 2020-07-19 NOTE — ED PROVIDER NOTES
ED Provider Note    CHIEF COMPLAINT  Chief Complaint   Patient presents with   • Fever     101.5   • Abdominal Pain     since this AM   • N/V   • Sore Throat       HPI  Elina Skaggs is a 60 y.o. female who presents the emergency room today with complaints of cough, congestion, fever despite nurses notes patient denied any abdominal pain but did have some nausea.  Symptoms started last few days.  She is a very poor historian and very difficult to get history from she does have history of chronic pain and is on opiates but states she has taken them as directed.  No chest pain but she has had some shortness of breath and oxygenation of 86% on room air patient was placed on oxygen appear to be in severe distress secondary to respiratory distress.  She states that she has no known COVID contacts.  Upon multiple questions patient again affirms that she has not taken more than her usual pain medications and usual doses and has not taken anything different.    REVIEW OF SYSTEMS  See HPI for further details. All other systems are negative.     PAST MEDICAL HISTORY  Past Medical History:   Diagnosis Date   • Aneurysm of cavernous portion of left internal carotid artery up in left side of Head, causes HA 04/2019   • Lung nodule < 6cm on CT 12/2015   • MEDICAL HOME 6/2015   • Impaired physical mobility 9/30/2013   • Constipation 9/27/2013   • Anxiety 1993    Dr. Freeman   • Asthma     inhalers     • Chronic back pain    • Constipation    • Cough variant asthma    • Dental disorder     dentures uppers   • Heart burn    • Hyperlipidemia    • Hypertension    • Psychiatric problem     depression   • Rheumatoid arteritis (HCC)     Fingers   • Sleep apnea    • Snoring        FAMILY HISTORY  [unfilled]    SOCIAL HISTORY  Social History     Socioeconomic History   • Marital status: Legally      Spouse name: Not on file   • Number of children: Not on file   • Years of education: Not on file   • Highest education level: Not on  file   Occupational History   • Occupation: [employer]     Employer: [employer]   Social Needs   • Financial resource strain: Not on file   • Food insecurity     Worry: Not on file     Inability: Not on file   • Transportation needs     Medical: Not on file     Non-medical: Not on file   Tobacco Use   • Smoking status: Current Some Day Smoker     Packs/day: 0.10     Years: 37.00     Pack years: 3.70     Types: Cigarettes   • Smokeless tobacco: Never Used   • Tobacco comment: she has n ot smoked in 2 weeks   Substance and Sexual Activity   • Alcohol use: No   • Drug use: No   • Sexual activity: Never     Partners: Male     Birth control/protection: Surgical   Lifestyle   • Physical activity     Days per week: Not on file     Minutes per session: Not on file   • Stress: Not on file   Relationships   • Social connections     Talks on phone: Not on file     Gets together: Not on file     Attends [employer] service: Not on file     Active member of club or organization: Not on file     Attends meetings of clubs or organizations: Not on file     Relationship status: Not on file   • Intimate partner violence     Fear of current or ex partner: Not on file     Emotionally abused: Not on file     Physically abused: Not on file     Forced sexual activity: Not on file   Other Topics Concern   • Not on file   Social History Narrative    ** Merged History Encounter **            SURGICAL HISTORY  Past Surgical History:   Procedure Laterality Date   • PB IMPLANT NEUROSTIM/ N/A 11/7/2019    Procedure: INSERTION, NEUROSTIMULATOR, PERMANENT, SPINAL CORD;  Surgeon: Checo Aldrich M.D.;  Location: SURGERY [facility];  Service: Pain Management   • MASS EXCISION GENERAL Left 2017    Left upper back, Not malignant   • COLONOSCOPY WITH POLYP  4/1/13    performed by Dr. Kelley -sigmoid colon polyp-fragments of tubular adenoma   • CERVICAL FUSION POSTERIOR  6/19/2012    Performed by PAVEL KIM at SURGERY [facility]  "[facility]   • CERVICAL DISK AND FUSION ANTERIOR  11/16/2010    Performed by PAVEL KIM at SURGERY [facility]   • APPENDECTOMY CHILD  1974   • APPENDECTOMY     • TUBAL COAGULATION LAPAROSCOPIC BILATERAL         CURRENT MEDICATIONS  Home Medications    **Home medications have not yet been reviewed for this encounter**         ALLERGIES  Allergies   Allergen Reactions   • Ambien [Zolpidem]      Sleep walking   • Cymbalta [Duloxetine Hcl] Rash and Itching     Redness to anterior neck.       PHYSICAL EXAM  VITAL SIGNS: /76   Pulse 97   Temp (!) 38.6 °C (101.5 °F) (Temporal)   Resp 18   Ht 1.626 m (5' 4\")   Wt 65.8 kg (145 lb)   SpO2 94%   BMI 24.89 kg/m²       Constitutional: Well developed, Well nourished, severe acute distress, Non-toxic appearance.   HENT: Normocephalic, Atraumatic, Bilateral external ears normal, Oropharynx moist, No oral exudates, Nose normal.   Eyes: PERRLA, EOMI, Conjunctiva normal, No discharge.   Neck: Normal range of motion, No tenderness, Supple, No stridor.   Lymphatic: No lymphadenopathy noted.   Cardiovascular: Normal heart rate, Normal rhythm, No murmurs, No rubs, No gallops.   Thorax & Lungs: Diminished breath sounds, patient noted to be in respiratory distress, No wheezing, No chest tenderness.   Abdomen: Bowel sounds normal, Soft, No tenderness, No masses, No pulsatile masses.   Skin: Warm, Dry, No erythema, No rash.   Back: No tenderness, No CVA tenderness.   Genitalia: External genitalia appear normal, No masses or lesions. No discharge.   Rectal: Normal appearance, Normal sphincter tone. No external or internal lesions noted. Stool is normal color and heme negative.   Extremities: Intact distal pulses, No edema, No tenderness, No cyanosis, No clubbing.   Musculoskeletal: Good range of motion in all major joints. No tenderness to palpation or major deformities noted.   Neurologic: Alert & oriented x 3 patient is lethargic at times and poor historian but is able " to follow commands and answer questions, Normal motor function, Normal sensory function, No focal deficits noted.   Psychiatric: Difficult to assess although she does have history of anxiety and anxious at times denies suicidal homicidal ideation.      RADIOLOGY/PROCEDURES  DX-CHEST-PORTABLE (1 VIEW)   Final Result      Borderline cardiomegaly perihilar opacification could be due to pulmonary edema, pneumonitis or hypoventilatory change.            COURSE & MEDICAL DECISION MAKING  Pertinent Labs & Imaging studies reviewed. (See chart for details)  Patient has normal white blood cell count temperature 101.5, COVID screen was negative she has hypoxic at 86% on room air suspect pneumonia chest x-ray showing borderline opacification perihilar.  Lactic acid normal urinalysis does show evidence of white blood cells culture obtained and pending, cultures obtained and pending.  Started on IV antibiotics Rocephin COVID test was performed but negative patient still may have possible infection.  Placed on increasing doses of oxygen to maintain her oxygen levels she is in severe distress second respiratory distress/hypoxia placed on cardiac monitor.  Discussed case with hospitalist patient be admitted to the hospital please see orders for further plan and care.    FINAL IMPRESSION  1.  Acute respiratory distress/hypoxia  2.  Bilateral pneumonia  3.  Febrile illness  4.  History of chronic pain, opiate dependent  5.  Critical care time of 42 minutes; this includes discussions with the hospitalist, review of records discussion treatment plan, interventions as discussed above, this does not including procedures.  Please see orders for further plan and care.         Electronically signed by: Bernardo Walsh D.O., 7/19/2020 2:52 PM

## 2020-07-19 NOTE — ASSESSMENT & PLAN NOTE
-nicotine replacement protocol and cessation education provided for 12 minutes, discussed options of nicotine patch, acupuncture, medical treatment with wellbutrin and chantix. Discussed other options. Code 33997

## 2020-07-19 NOTE — ASSESSMENT & PLAN NOTE
Patient is afebrile since he has been in the hospital.  Patient's hallucinations have completely resolved.  Patient's COVID test is negative.  Patient is requiring Tylenol as needed.

## 2020-07-19 NOTE — ASSESSMENT & PLAN NOTE
Patient's acute encephalopathy has completely resolved.  At home she was hallucinating seeing her grandchildren in her room talking to her even though that her grandchildren do not live in the same state.

## 2020-07-19 NOTE — ED NOTES
Pt resting comfortably. VSS Stable appears in no acute distress. Denies any additional needs.Call light within reach. Awaiting admission.

## 2020-07-20 PROCEDURE — 99232 SBSQ HOSP IP/OBS MODERATE 35: CPT | Mod: 25 | Performed by: HOSPITALIST

## 2020-07-20 PROCEDURE — A9270 NON-COVERED ITEM OR SERVICE: HCPCS | Performed by: HOSPITALIST

## 2020-07-20 PROCEDURE — 700105 HCHG RX REV CODE 258: Performed by: HOSPITALIST

## 2020-07-20 PROCEDURE — 770001 HCHG ROOM/CARE - MED/SURG/GYN PRIV*

## 2020-07-20 PROCEDURE — 99407 BEHAV CHNG SMOKING > 10 MIN: CPT | Performed by: HOSPITALIST

## 2020-07-20 PROCEDURE — 700102 HCHG RX REV CODE 250 W/ 637 OVERRIDE(OP): Performed by: HOSPITALIST

## 2020-07-20 PROCEDURE — 700111 HCHG RX REV CODE 636 W/ 250 OVERRIDE (IP): Performed by: HOSPITALIST

## 2020-07-20 RX ADMIN — OXYCODONE 10 MG: 5 TABLET ORAL at 08:56

## 2020-07-20 RX ADMIN — CEFTRIAXONE SODIUM 1 G: 1 INJECTION, POWDER, FOR SOLUTION INTRAMUSCULAR; INTRAVENOUS at 05:38

## 2020-07-20 RX ADMIN — OXYCODONE 10 MG: 5 TABLET ORAL at 19:06

## 2020-07-20 RX ADMIN — ENOXAPARIN SODIUM 40 MG: 40 INJECTION SUBCUTANEOUS at 05:39

## 2020-07-20 RX ADMIN — SERTRALINE 100 MG: 100 TABLET, FILM COATED ORAL at 17:27

## 2020-07-20 RX ADMIN — OXYCODONE 10 MG: 5 TABLET ORAL at 14:27

## 2020-07-20 RX ADMIN — DOCUSATE SODIUM 50 MG AND SENNOSIDES 8.6 MG 2 TABLET: 8.6; 5 TABLET, FILM COATED ORAL at 05:38

## 2020-07-20 RX ADMIN — OXYCODONE 10 MG: 5 TABLET ORAL at 23:33

## 2020-07-20 ASSESSMENT — ENCOUNTER SYMPTOMS
HEMOPTYSIS: 0
GASTROINTESTINAL NEGATIVE: 1
LOSS OF CONSCIOUSNESS: 0
CARDIOVASCULAR NEGATIVE: 1
VOMITING: 0
PALPITATIONS: 0
WHEEZING: 0
FEVER: 0
DIZZINESS: 0
HEARTBURN: 0
HALLUCINATIONS: 1
NERVOUS/ANXIOUS: 0
BRUISES/BLEEDS EASILY: 0
HEADACHES: 0
DIAPHORESIS: 0
SEIZURES: 0
DIARRHEA: 0
ABDOMINAL PAIN: 0
FOCAL WEAKNESS: 0
CONSTIPATION: 0
DOUBLE VISION: 0
CHILLS: 1
EYES NEGATIVE: 1
NEUROLOGICAL NEGATIVE: 1
MUSCULOSKELETAL NEGATIVE: 1
NAUSEA: 0
COUGH: 1
BLOOD IN STOOL: 0

## 2020-07-20 ASSESSMENT — PATIENT HEALTH QUESTIONNAIRE - PHQ9
SUM OF ALL RESPONSES TO PHQ9 QUESTIONS 1 AND 2: 0
2. FEELING DOWN, DEPRESSED, IRRITABLE, OR HOPELESS: NOT AT ALL
1. LITTLE INTEREST OR PLEASURE IN DOING THINGS: NOT AT ALL

## 2020-07-20 NOTE — PROGRESS NOTES
Hospital Medicine Daily Progress Note    Date of Service  7/20/2020    Chief Complaint  60 y.o. female admitted 7/19/2020 with hallucinations    Hospital Course    Patient is a 60-year-old female who at home was having hallucinations that her grandchildren was standing next to her in her room and talking to her.  Her grandchildren do not live in the same house that she does.  The grandson when not in her house.  Family noticed this and so they called paramedics and brought her into the hospital at home they were measuring fevers of 102 103.  In the hospital patient has been afebrile.  The patient at this point has resolved completely with her hallucinations.  She is aware that she did have hallucinations.  The patient tested negative for COVID-19. The patient is hypoxic requiring 2 L by nasal cannula.  We will optimize her medical management over the next 24 hours.  If the patient is still hypoxic tomorrow the patient will need home O2 set up and then she can possibly discharge home with outpatient management.      Interval Problem Update  Optimize oxygen use  Monitor for fever and chills  COVID-19 negative  Possible early pneumonia  Patient currently is not septic  Continue with current pain management for her back pain.  Optimize blood pressure management.    Consultants/Specialty  None    Code Status  Full    Disposition  TBD    Review of Systems  Review of Systems   Constitutional: Positive for chills and malaise/fatigue. Negative for diaphoresis and fever.   HENT: Negative.    Eyes: Negative.  Negative for double vision.   Respiratory: Positive for cough. Negative for hemoptysis and wheezing.    Cardiovascular: Negative.  Negative for chest pain, palpitations and leg swelling.   Gastrointestinal: Negative.  Negative for abdominal pain, blood in stool, constipation, diarrhea, heartburn, nausea and vomiting.   Genitourinary: Negative.  Negative for frequency, hematuria and urgency.   Musculoskeletal: Negative.   Negative for joint pain.   Skin: Negative.  Negative for itching and rash.   Neurological: Negative.  Negative for dizziness, focal weakness, seizures, loss of consciousness and headaches.   Endo/Heme/Allergies: Negative.  Does not bruise/bleed easily.   Psychiatric/Behavioral: Positive for hallucinations. Negative for suicidal ideas. The patient is not nervous/anxious.         Physical Exam  Temp:  [36.2 °C (97.2 °F)-38.6 °C (101.5 °F)] 36.2 °C (97.2 °F)  Pulse:  [70-97] 77  Resp:  [12-19] 19  BP: ()/(51-76) 123/67  SpO2:  [86 %-98 %] 96 %    Physical Exam  Vitals signs and nursing note reviewed.   Constitutional:       Appearance: Normal appearance. She is well-developed and normal weight.   HENT:      Head: Normocephalic and atraumatic.      Right Ear: External ear normal.      Left Ear: External ear normal.      Nose: Nose normal.      Mouth/Throat:      Mouth: Mucous membranes are moist.      Pharynx: Oropharynx is clear.   Eyes:      Extraocular Movements: Extraocular movements intact.      Conjunctiva/sclera: Conjunctivae normal.      Pupils: Pupils are equal, round, and reactive to light.   Neck:      Musculoskeletal: Normal range of motion and neck supple.      Thyroid: No thyromegaly.      Vascular: No JVD.   Cardiovascular:      Rate and Rhythm: Normal rate and regular rhythm.      Pulses: Normal pulses.      Heart sounds: Normal heart sounds.   Pulmonary:      Effort: Pulmonary effort is normal.      Breath sounds: Normal breath sounds.   Chest:      Chest wall: No tenderness.   Abdominal:      General: Abdomen is flat. Bowel sounds are normal. There is no distension.      Palpations: Abdomen is soft. There is no mass.      Tenderness: There is no abdominal tenderness. There is no guarding or rebound.   Musculoskeletal: Normal range of motion.      Right lower leg: No edema.      Left lower leg: No edema.   Lymphadenopathy:      Cervical: No cervical adenopathy.   Skin:     General: Skin is warm  and dry.      Capillary Refill: Capillary refill takes 2 to 3 seconds.      Coloration: Skin is not jaundiced or pale.      Findings: No erythema or rash.   Neurological:      General: No focal deficit present.      Mental Status: She is alert and oriented to person, place, and time. Mental status is at baseline.      Cranial Nerves: No cranial nerve deficit.      Deep Tendon Reflexes: Reflexes are normal and symmetric.   Psychiatric:         Mood and Affect: Mood normal.         Behavior: Behavior normal.         Thought Content: Thought content normal.         Judgment: Judgment normal.         Fluids    Intake/Output Summary (Last 24 hours) at 7/20/2020 0847  Last data filed at 7/19/2020 2200  Gross per 24 hour   Intake 1340 ml   Output --   Net 1340 ml       Laboratory  Recent Labs     07/19/20  1257   WBC 9.0   RBC 3.79*   HEMOGLOBIN 11.3*   HEMATOCRIT 35.8*   MCV 94.5   MCH 29.8   MCHC 31.6*   RDW 44.5   PLATELETCT 258   MPV 9.7     Recent Labs     07/19/20  1257   SODIUM 139   POTASSIUM 4.6   CHLORIDE 103   CO2 24   GLUCOSE 127*   BUN 16   CREATININE 1.06   CALCIUM 8.8     Recent Labs     07/19/20  1257   INR 1.03               Imaging  DX-CHEST-PORTABLE (1 VIEW)   Final Result      Borderline cardiomegaly perihilar opacification could be due to pulmonary edema, pneumonitis or hypoventilatory change.           Assessment/Plan  * Hypoxia- (present on admission)  Assessment & Plan  Patient's hypoxia is concerning.  When I initially walked into the room the patient's oxygen saturation was 73%.  She has not had her oxygen on and an hour she says at this point I put the oxygen back on and she went up to 96% on 2 L by nasal cannula.  Continue with oxygen support and if necessary RT protocol will be initiated.  Patient is negative for COVID-19.    Fever- (present on admission)  Assessment & Plan  Patient is afebrile since he has been in the hospital.  Patient's hallucinations have completely resolved.  Patient's  COVID test is negative.  Patient is requiring Tylenol as needed.    Encephalopathy acute- (present on admission)  Assessment & Plan  Patient's acute encephalopathy has completely resolved.  At home she was hallucinating seeing her grandchildren in her room talking to her even though that her grandchildren do not live in the same state.    Chronic low back pain- (present on admission)  Assessment & Plan  Patient has chronic low back pain and she uses oxycodone as needed for pain management.    Tobacco abuse- (present on admission)  Assessment & Plan  -nicotine replacement protocol and cessation education provided for 12 minutes, discussed options of nicotine patch, acupuncture, medical treatment with wellbutrin and chantix. Discussed other options. Code 39799         VTE prophylaxis: Lovenox

## 2020-07-20 NOTE — PROGRESS NOTES
Pt became tearful and voiced fears of dying and never seeing her family.  Pt comforted and educated about zoom and the tablets.

## 2020-07-20 NOTE — CARE PLAN
Problem: Safety  Goal: Will remain free from falls  Outcome: PROGRESSING AS EXPECTED  Intervention: Assess risk factors for falls  Flowsheets  Taken 7/19/2020 2200 by Sandeep Negron R.N.  Pt Calls for Assistance: Yes  Taken 7/19/2020 1740 by Tomy Calvo R.N.  History of fall: 0  Taken 7/20/2020 0536 by Sandeep Negron R.N.  Mobility Status Assessment: 1-1 Healthcare Provider Required for Assistance with Ambulation & Transfer  Note: Pt's call light within reach, pt calls for assistance, bed locked and in lowest position, frequent rounded, personal items within reach      Problem: Infection  Goal: Will remain free from infection  Outcome: PROGRESSING AS EXPECTED  Intervention: Implement standard precautions and perform hand washing before and after patient contact  Note: Handwashing performed, pt educate on importance of handwashing

## 2020-07-20 NOTE — CARE PLAN
Problem: Communication  Goal: The ability to communicate needs accurately and effectively will improve  Outcome: MET     Problem: Safety  Goal: Will remain free from injury  Outcome: MET     Problem: Infection  Goal: Will remain free from infection  Outcome: MET     Problem: Knowledge Deficit  Goal: Knowledge of disease process/condition, treatment plan, diagnostic tests, and medications will improve  Outcome: MET     Problem: Respiratory:  Goal: Respiratory status will improve  Outcome: MET

## 2020-07-21 VITALS
RESPIRATION RATE: 20 BRPM | OXYGEN SATURATION: 94 % | WEIGHT: 153 LBS | SYSTOLIC BLOOD PRESSURE: 162 MMHG | HEIGHT: 64 IN | TEMPERATURE: 98.7 F | HEART RATE: 87 BPM | DIASTOLIC BLOOD PRESSURE: 83 MMHG | BODY MASS INDEX: 26.12 KG/M2

## 2020-07-21 LAB
BACTERIA UR CULT: NORMAL
SIGNIFICANT IND 70042: NORMAL
SITE SITE: NORMAL
SOURCE SOURCE: NORMAL

## 2020-07-21 PROCEDURE — 700102 HCHG RX REV CODE 250 W/ 637 OVERRIDE(OP): Performed by: HOSPITALIST

## 2020-07-21 PROCEDURE — 700105 HCHG RX REV CODE 258: Performed by: HOSPITALIST

## 2020-07-21 PROCEDURE — 99239 HOSP IP/OBS DSCHRG MGMT >30: CPT | Performed by: INTERNAL MEDICINE

## 2020-07-21 PROCEDURE — A9270 NON-COVERED ITEM OR SERVICE: HCPCS | Performed by: HOSPITALIST

## 2020-07-21 PROCEDURE — 700111 HCHG RX REV CODE 636 W/ 250 OVERRIDE (IP): Performed by: HOSPITALIST

## 2020-07-21 RX ADMIN — ENOXAPARIN SODIUM 40 MG: 40 INJECTION SUBCUTANEOUS at 04:16

## 2020-07-21 RX ADMIN — CEFTRIAXONE SODIUM 1 G: 1 INJECTION, POWDER, FOR SOLUTION INTRAMUSCULAR; INTRAVENOUS at 04:17

## 2020-07-21 RX ADMIN — OXYCODONE 10 MG: 5 TABLET ORAL at 15:20

## 2020-07-21 RX ADMIN — OXYCODONE 10 MG: 5 TABLET ORAL at 09:05

## 2020-07-21 RX ADMIN — OXYCODONE 10 MG: 5 TABLET ORAL at 04:16

## 2020-07-21 ASSESSMENT — COGNITIVE AND FUNCTIONAL STATUS - GENERAL
SUGGESTED CMS G CODE MODIFIER MOBILITY: CH
DAILY ACTIVITIY SCORE: 24
MOBILITY SCORE: 24
SUGGESTED CMS G CODE MODIFIER DAILY ACTIVITY: CH

## 2020-07-21 ASSESSMENT — LIFESTYLE VARIABLES
CONSUMPTION TOTAL: NEGATIVE
TOTAL SCORE: 0
ON A TYPICAL DAY WHEN YOU DRINK ALCOHOL HOW MANY DRINKS DO YOU HAVE: 0
HOW MANY TIMES IN THE PAST YEAR HAVE YOU HAD 5 OR MORE DRINKS IN A DAY: 0
AVERAGE NUMBER OF DAYS PER WEEK YOU HAVE A DRINK CONTAINING ALCOHOL: 0
EVER HAD A DRINK FIRST THING IN THE MORNING TO STEADY YOUR NERVES TO GET RID OF A HANGOVER: NO
EVER FELT BAD OR GUILTY ABOUT YOUR DRINKING: NO
HAVE PEOPLE ANNOYED YOU BY CRITICIZING YOUR DRINKING: NO
HAVE YOU EVER FELT YOU SHOULD CUT DOWN ON YOUR DRINKING: NO
ALCOHOL_USE: NO

## 2020-07-21 NOTE — CARE PLAN
Problem: Discharge Barriers/Planning  Goal: Patient's continuum of care needs will be met  Outcome: DISCHARGED-GOAL NOT MET       Patient left AMA

## 2020-07-21 NOTE — FACE TO FACE
"Face to Face Note  -  Durable Medical Equipment    Lane Marks D.O. - NPI: [id number]  I certify that this patient is under my care and that they had a durable medical equipment(DME)face to face encounter by myself that meets the physician DME face-to-face encounter requirements with this patient on:    Date of encounter:   Patient:                    MRN:                       YOB: 2022  Elina Skaggs  [id number]  1960     The encounter with the patient was in whole, or in part, for the following medical condition, which is the primary reason for durable medical equipment:  Other - Hypoxia secondary to COVID 19    I certify that, based on my findings, the following durable medical equipment is medically necessary:  Oxygen.    HOME O2 Saturation Measurements:(Values must be present for Home Oxygen orders)  Room air sat at rest: 80  Room air sat with amb: (cannot ambulate patient with pulse ox of 80% at rest)  With liters of O2: 2, O2 sat at rest with O2: 94  With Liters of O2: 2, O2 sat with amb with O2 : 91  Is the patient mobile?: Yes    My Clinical findings support the need for the above equipment due to:  Hypoxia    Supporting Symptoms: The patient requires supplemental oxygen, as the following interventions have been tried with limited or no improvement: \"Ambulation with oximetry    "

## 2020-07-21 NOTE — PROGRESS NOTES
2 RN skin check complete with DAREN Antonio.  Devices in place Nasal Cannula and reading glasses.   Skin assessed under devices Yes.  Confirmed pressure ulcers found on NA.  New potential pressure ulcers noted on NA. Wound consult placed NA.  The following interventions in place grey foams, q2 hour turns.   Scar to left knee (healed), scar to left upper thigh, bruising to abdomen.

## 2020-07-21 NOTE — CARE PLAN
Assumed day shift care at start of shift  Patient a+o x 4  Denies hallucinations, n/v/ap/sore throat (admitting symptoms)  Bm this am  Chronic back pain - c/o 9/10 back pain - patient informed that she cannot get pain meds until 0815 - patient agreeable to plan  Ls = ctab, on 2lpm of oxygen via nc - with oxygen saturation of 93%  Patient uses oxygen at home (hs)  Patient hoping to be discharged today  No needs at this time, wctm    Fall precautions/hourly rounding maintained, call light within reach and functioning, all items within reach.  Patient encouraged to call for assistance, poc reviewed with patient, ?'s/concerns answered.       Problem: Knowledge Deficit  Goal: Knowledge of the prescribed therapeutic regimen will improve  Outcome: PROGRESSING AS EXPECTED     Problem: Pain Management  Goal: Pain level will decrease to patient's comfort goal  Outcome: PROGRESSING AS EXPECTED

## 2020-07-21 NOTE — DISCHARGE SUMMARY
Discharge Summary    CHIEF COMPLAINT ON ADMISSION  Chief Complaint   Patient presents with   • Fever     101.5   • Abdominal Pain     since this AM   • N/V   • Sore Throat       Reason for Admission  Other     Admission Date  7/19/2020    CODE STATUS  Full Code    HPI & HOSPITAL COURSE  Ms. Elina Skaggs is a 60 y.o. female who presented on 7/19/2020 with hallucinations that her grandchildren were standing next to her and talking to her.  Family became concerned and called EMS.  On presentation patient was febrile up to 103.  Hallucinations have resolved spontaneously.  COVID-19 was negative.  Patient was hypoxic requiring 2 L nasal cannula.  Was started on ceftriaxone.  Patient failed ambulatory symmetry.  She did not want to wait for submental oxygen or to complete her antibiotic course.  She left AGAINST MEDICAL ADVICE.  She was alert and oriented at this time and understood the risks of leaving against recommendations.       Therefore, she is discharged in guarded and stable condition against medcial advice.    The patient met 2-midnight criteria for an inpatient stay at the time of discharge.    Discharge Date  7/21/2020    FOLLOW UP ITEMS POST DISCHARGE  None    DISCHARGE DIAGNOSES  Principal Problem:    Hypoxia POA: Yes  Active Problems:    Fever POA: Yes    Chronic low back pain POA: Yes    Encephalopathy acute POA: Yes    Tobacco abuse POA: Yes      Overview: :  Resolved Problems:    * No resolved hospital problems. *      FOLLOW UP  No follow-up provider specified.    MEDICATIONS ON DISCHARGE     Medication List      ASK your doctor about these medications      Instructions   albuterol 108 (90 Base) MCG/ACT Aers inhalation aerosol   Inhale 2 Puffs by mouth every 6 hours as needed for Shortness of Breath.  Dose:  2 Puff     atorvastatin 20 MG Tabs  Commonly known as:  LIPITOR   Take 1 Tab by mouth every evening.  Dose:  20 mg     Breo Ellipta 200-25 MCG/INH Aepb  Generic drug:  Fluticasone  Furoate-Vilanterol   Inhale 1 Puff by mouth every day.  Dose:  1 Puff     cloNIDine 0.1 MG Tabs  Commonly known as:  CATAPRES   Take 0.1 mg by mouth 2 times a day as needed. SBP> 160  DBP> 90  Indications: High Blood Pressure Disorder  Dose:  0.1 mg     cyclobenzaprine 10 MG Tabs  Commonly known as:  FLEXERIL  Ask about: Which instructions should I use?   Take 10 mg by mouth 2 times a day as needed for Muscle Spasms.  Dose:  10 mg     enalapril 20 MG tablet  Commonly known as:  VASOTEC   Take 1 Tab by mouth every day.  Dose:  20 mg     gabapentin 800 MG tablet  Commonly known as:  NEURONTIN   Take 800 mg by mouth 3 times a day.  Dose:  800 mg     nortriptyline 10 MG Caps  Commonly known as:  PAMELOR   Take 10 mg by mouth every bedtime.  Dose:  10 mg     oxyCODONE immediate release 10 MG immediate release tablet  Commonly known as:  ROXICODONE   Take 10 mg by mouth every four hours as needed for Moderate Pain.  Dose:  10 mg     sertraline 100 MG Tabs  Commonly known as:  ZOLOFT   Take 1 Tab by mouth every evening.  Dose:  100 mg     SUMAtriptan 50 MG Tabs  Commonly known as:  IMITREX   Take 50 mg by mouth 1 time daily as needed for Migraine.  Dose:  50 mg            Allergies  Allergies   Allergen Reactions   • Ambien [Zolpidem]      Sleep walking   • Cymbalta [Duloxetine Hcl] Rash and Itching     Redness to anterior neck.       DIET  Orders Placed This Encounter   Procedures   • Diet Order Regular     Standing Status:   Standing     Number of Occurrences:   1     Order Specific Question:   Diet:     Answer:   Regular [1]       ACTIVITY  As tolerated.  Weight bearing as tolerated    CONSULTATIONS  None    PROCEDURES  None    LABORATORY  Lab Results   Component Value Date    SODIUM 139 07/19/2020    POTASSIUM 4.6 07/19/2020    CHLORIDE 103 07/19/2020    CO2 24 07/19/2020    GLUCOSE 127 (H) 07/19/2020    BUN 16 07/19/2020    CREATININE 1.06 07/19/2020    CREATININE 0.7 02/04/2007        Lab Results   Component Value  Date    WBC 9.0 07/19/2020    HEMOGLOBIN 11.3 (L) 07/19/2020    HEMATOCRIT 35.8 (L) 07/19/2020    PLATELETCT 258 07/19/2020        I discussed medications and side effects with the patient.  I discussed prognosis and importance of medical compliance with the patient.  I counseled the patient about diet, exercise, weight loss, smoke cessation, and life style modifications.  All questions and concerns have been addressed.  Total time of the discharge process was 39 minutes.

## 2020-07-21 NOTE — PROGRESS NOTES
Patient does not want to wait for oxygen approval and has decided to leave against medical advice. Patient informed that the oxygen request will be suspended if she leaves AMA. Patient aware and agreeable to plan on leaving AMA. Dr. Lane Marks and Case Management (Belkis) informed.

## 2020-07-21 NOTE — DISCHARGE PLANNING
In the case of an emergency, pt's legal NOK is: Jo Skaggs--dtr--[phone].     RNCM contacted pt via phone and obtained the following information used in this assessment. Pt verified accuracy of facesheet. Pt's lives in a 3 story home with her dtr. Pt uses [email or web address] pharmacy on [address]. Prior to current hospitalization pt was dependent in ADLS/IADLS. Pt's dtr drives to get to necessary MD appointments. Pt's PCP is Scar Smart. Pt denies financial concerns. Pt has a strong support system. Pt denies any hx of substance abuse. Pt verbalizes a mental health hx of severe anxiety.    Upon discharge patient states that his/her dtr will provide discharge transportation to home.    Care Transition Team Assessment    Information Source  Orientation : Oriented x 4  Information Given By: Patient  Informant's Name: Elina  Who is responsible for making decisions for patient? : Patient    Readmission Evaluation  Is this a readmission?: No    Elopement Risk  Legal Hold: No  Ambulatory or Self Mobile in Wheelchair: Yes  Disoriented: No  Psychiatric Symptoms: None  History of Wandering: No  Elopement this Admit: No  Vocalizing Wanting to Leave: No  Displays Behaviors, Body Language Wanting to Leave: No-Not at Risk for Elopement  Elopement Risk: Not at Risk for Elopement    Interdisciplinary Discharge Planning  Does Admitting Nurse Feel This Could be a Complex Discharge?: No  Primary Care Physician: Scar Smart  Lives with - Patient's Self Care Capacity: Adult Children  Patient or legal guardian wants to designate a caregiver (see row info): No  Support Systems: Family Member(s)  Housing / Facility: 3 Story House  Do You Take your Prescribed Medications Regularly: Yes  Able to Return to Previous ADL's: Yes  Mobility Issues: Yes  Prior Services: None  Patient Expects to be Discharged to:: Home  Assistance Needed: Yes  Durable Medical Equipment: Walker, Other - Specify(Crutches, shower chair)    Discharge Preparedness  What is  your plan after discharge?: Home with help  What are your discharge supports?: Child  Prior Functional Level: Ambulatory, Needs Assist with Activities of Daily Living, Needs Assist with Medication Management, Uses Walker  Difficulity with ADLs: Bathing, Dressing  Difficulity with IADLs: Cooking, Driving, Keeping track of finances, Laundry, Shopping, Managing medication    Functional Assesment  Prior Functional Level: Ambulatory, Needs Assist with Activities of Daily Living, Needs Assist with Medication Management, Uses Walker    Finances  Financial Barriers to Discharge: No  Prescription Coverage: Yes    Vision / Hearing Impairment  Vision Impairment : No  Hearing Impairment : No         Advance Directive  Advance Directive?: None    Domestic Abuse  Have you ever been the victim of abuse or violence?: No  Physical Abuse or Sexual Abuse: No  Verbal Abuse or Emotional Abuse: No  Possible Abuse Reported to:: Not Applicable    Psychological Assessment  History of Substance Abuse: None  History of Psychiatric Problems: No  Non-compliant with Treatment: No    Discharge Risks or Barriers  Discharge risks or barriers?: No    Anticipated Discharge Information  Anticipated discharge disposition: Home  Discharge Address: [address] [place] [address]  Discharge Contact Phone Number: [phone]

## 2020-07-21 NOTE — DISCHARGE PLANNING
Hospital Care Management Discharge Planning       Anticipated Discharge Disposition:   · Home     Action:   · Chart reviewed. Pt still requiring 1.5-2L O2.   · Anticipate home O2 if unable to ween.     Barriers to Discharge:   · Adequate oxygenation v Home O2.     Plan:    · Continue to provide support services and assistance with discharge planning as needed.

## 2020-07-22 NOTE — DISCHARGE PLANNING
Hospital Care Management Discharge Planning       Anticipated Discharge Disposition:   · Home with dtr and new home O2     Action:   · CM completed initial assessment and O2 Choice that was faxed to [facility].  · CM notified BS RN time frame of O2 as pt verbalizing anxiousness to discharge today.     Barriers to Discharge:   · New O2 set up and acceptance     Plan:   · F/U with DME referral.   · Continue to provide support services and assistance with discharge planning as needed.

## 2020-08-04 RX ORDER — SERTRALINE HYDROCHLORIDE 100 MG/1
TABLET, FILM COATED ORAL
Qty: 90 TAB | Refills: 1 | OUTPATIENT
Start: 2020-08-04

## 2020-10-24 ENCOUNTER — APPOINTMENT (OUTPATIENT)
Dept: RADIOLOGY | Facility: MEDICAL CENTER | Age: 60
DRG: 064 | End: 2020-10-24
Attending: EMERGENCY MEDICINE
Payer: COMMERCIAL

## 2020-10-24 ENCOUNTER — APPOINTMENT (OUTPATIENT)
Dept: RADIOLOGY | Facility: MEDICAL CENTER | Age: 60
DRG: 064 | End: 2020-10-24
Attending: STUDENT IN AN ORGANIZED HEALTH CARE EDUCATION/TRAINING PROGRAM
Payer: COMMERCIAL

## 2020-10-24 ENCOUNTER — HOSPITAL ENCOUNTER (INPATIENT)
Facility: MEDICAL CENTER | Age: 60
LOS: 2 days | DRG: 064 | End: 2020-10-26
Attending: EMERGENCY MEDICINE | Admitting: STUDENT IN AN ORGANIZED HEALTH CARE EDUCATION/TRAINING PROGRAM
Payer: COMMERCIAL

## 2020-10-24 DIAGNOSIS — R13.19 ESOPHAGEAL DYSPHAGIA: ICD-10-CM

## 2020-10-24 DIAGNOSIS — F32.0 CURRENT MILD EPISODE OF MAJOR DEPRESSIVE DISORDER WITHOUT PRIOR EPISODE (HCC): ICD-10-CM

## 2020-10-24 DIAGNOSIS — I63.9 ACUTE CVA (CEREBROVASCULAR ACCIDENT) (HCC): ICD-10-CM

## 2020-10-24 DIAGNOSIS — R53.81 DEBILITY: ICD-10-CM

## 2020-10-24 DIAGNOSIS — E78.5 DYSLIPIDEMIA: ICD-10-CM

## 2020-10-24 PROBLEM — R22.31 LOCALIZED SWELLING ON RIGHT HAND: Status: ACTIVE | Noted: 2020-10-24

## 2020-10-24 PROBLEM — R41.82 AMS (ALTERED MENTAL STATUS): Status: ACTIVE | Noted: 2020-10-24

## 2020-10-24 PROBLEM — G89.4 CHRONIC PAIN SYNDROME: Status: ACTIVE | Noted: 2020-10-24

## 2020-10-24 PROBLEM — I10 ESSENTIAL HYPERTENSION: Status: ACTIVE | Noted: 2020-10-24

## 2020-10-24 LAB
ABO GROUP BLD: NORMAL
ALBUMIN SERPL BCP-MCNC: 3.7 G/DL (ref 3.2–4.9)
ALBUMIN/GLOB SERPL: 1.2 G/DL
ALP SERPL-CCNC: 120 U/L (ref 30–99)
ALT SERPL-CCNC: 13 U/L (ref 2–50)
ANION GAP SERPL CALC-SCNC: 10 MMOL/L (ref 7–16)
APTT PPP: 33.2 SEC (ref 24.7–36)
AST SERPL-CCNC: 16 U/L (ref 12–45)
BASOPHILS # BLD AUTO: 1.2 % (ref 0–1.8)
BASOPHILS # BLD: 0.05 K/UL (ref 0–0.12)
BILIRUB SERPL-MCNC: 0.2 MG/DL (ref 0.1–1.5)
BLD GP AB SCN SERPL QL: NORMAL
BUN SERPL-MCNC: 10 MG/DL (ref 8–22)
CALCIUM SERPL-MCNC: 9 MG/DL (ref 8.5–10.5)
CHLORIDE SERPL-SCNC: 100 MMOL/L (ref 96–112)
CO2 SERPL-SCNC: 28 MMOL/L (ref 20–33)
COVID ORDER STATUS COVID19: NORMAL
CREAT SERPL-MCNC: 0.74 MG/DL (ref 0.5–1.4)
EKG IMPRESSION: NORMAL
EOSINOPHIL # BLD AUTO: 0.08 K/UL (ref 0–0.51)
EOSINOPHIL NFR BLD: 1.9 % (ref 0–6.9)
ERYTHROCYTE [DISTWIDTH] IN BLOOD BY AUTOMATED COUNT: 45.1 FL (ref 35.9–50)
EST. AVERAGE GLUCOSE BLD GHB EST-MCNC: 143 MG/DL
ETHANOL BLD-MCNC: <10.1 MG/DL (ref 0–10)
GLOBULIN SER CALC-MCNC: 3 G/DL (ref 1.9–3.5)
GLUCOSE SERPL-MCNC: 184 MG/DL (ref 65–99)
HBA1C MFR BLD: 6.6 % (ref 0–5.6)
HCT VFR BLD AUTO: 37.9 % (ref 37–47)
HGB BLD-MCNC: 12 G/DL (ref 12–16)
HIV 1+2 AB+HIV1 P24 AG SERPL QL IA: NORMAL
IMM GRANULOCYTES # BLD AUTO: 0.01 K/UL (ref 0–0.11)
IMM GRANULOCYTES NFR BLD AUTO: 0.2 % (ref 0–0.9)
INR PPP: 1.04 (ref 0.87–1.13)
LYMPHOCYTES # BLD AUTO: 1.7 K/UL (ref 1–4.8)
LYMPHOCYTES NFR BLD: 39.4 % (ref 22–41)
MCH RBC QN AUTO: 29.7 PG (ref 27–33)
MCHC RBC AUTO-ENTMCNC: 31.7 G/DL (ref 33.6–35)
MCV RBC AUTO: 93.8 FL (ref 81.4–97.8)
MONOCYTES # BLD AUTO: 0.25 K/UL (ref 0–0.85)
MONOCYTES NFR BLD AUTO: 5.8 % (ref 0–13.4)
NEUTROPHILS # BLD AUTO: 2.22 K/UL (ref 2–7.15)
NEUTROPHILS NFR BLD: 51.5 % (ref 44–72)
NRBC # BLD AUTO: 0 K/UL
NRBC BLD-RTO: 0 /100 WBC
PLATELET # BLD AUTO: 326 K/UL (ref 164–446)
PMV BLD AUTO: 9.7 FL (ref 9–12.9)
POTASSIUM SERPL-SCNC: 3.6 MMOL/L (ref 3.6–5.5)
PROT SERPL-MCNC: 6.7 G/DL (ref 6–8.2)
PROTHROMBIN TIME: 13.9 SEC (ref 12–14.6)
RBC # BLD AUTO: 4.04 M/UL (ref 4.2–5.4)
RH BLD: NORMAL
SODIUM SERPL-SCNC: 138 MMOL/L (ref 135–145)
TREPONEMA PALLIDUM IGG+IGM AB [PRESENCE] IN SERUM OR PLASMA BY IMMUNOASSAY: NORMAL
TROPONIN T SERPL-MCNC: 10 NG/L (ref 6–19)
TSH SERPL DL<=0.005 MIU/L-ACNC: 0.41 UIU/ML (ref 0.38–5.33)
VIT B12 SERPL-MCNC: 747 PG/ML (ref 211–911)
WBC # BLD AUTO: 4.3 K/UL (ref 4.8–10.8)

## 2020-10-24 PROCEDURE — 70450 CT HEAD/BRAIN W/O DYE: CPT

## 2020-10-24 PROCEDURE — 99223 1ST HOSP IP/OBS HIGH 75: CPT | Performed by: PSYCHIATRY & NEUROLOGY

## 2020-10-24 PROCEDURE — 80307 DRUG TEST PRSMV CHEM ANLYZR: CPT

## 2020-10-24 PROCEDURE — 700102 HCHG RX REV CODE 250 W/ 637 OVERRIDE(OP): Performed by: STUDENT IN AN ORGANIZED HEALTH CARE EDUCATION/TRAINING PROGRAM

## 2020-10-24 PROCEDURE — 36415 COLL VENOUS BLD VENIPUNCTURE: CPT

## 2020-10-24 PROCEDURE — 70496 CT ANGIOGRAPHY HEAD: CPT

## 2020-10-24 PROCEDURE — 86850 RBC ANTIBODY SCREEN: CPT

## 2020-10-24 PROCEDURE — 85025 COMPLETE CBC W/AUTO DIFF WBC: CPT

## 2020-10-24 PROCEDURE — 86900 BLOOD TYPING SEROLOGIC ABO: CPT

## 2020-10-24 PROCEDURE — C9803 HOPD COVID-19 SPEC COLLECT: HCPCS | Performed by: INTERNAL MEDICINE

## 2020-10-24 PROCEDURE — 71045 X-RAY EXAM CHEST 1 VIEW: CPT

## 2020-10-24 PROCEDURE — U0003 INFECTIOUS AGENT DETECTION BY NUCLEIC ACID (DNA OR RNA); SEVERE ACUTE RESPIRATORY SYNDROME CORONAVIRUS 2 (SARS-COV-2) (CORONAVIRUS DISEASE [COVID-19]), AMPLIFIED PROBE TECHNIQUE, MAKING USE OF HIGH THROUGHPUT TECHNOLOGIES AS DESCRIBED BY CMS-2020-01-R: HCPCS

## 2020-10-24 PROCEDURE — 99223 1ST HOSP IP/OBS HIGH 75: CPT | Mod: AI | Performed by: STUDENT IN AN ORGANIZED HEALTH CARE EDUCATION/TRAINING PROGRAM

## 2020-10-24 PROCEDURE — 0042T CT-CEREBRAL PERFUSION ANALYSIS: CPT

## 2020-10-24 PROCEDURE — 99285 EMERGENCY DEPT VISIT HI MDM: CPT

## 2020-10-24 PROCEDURE — 70498 CT ANGIOGRAPHY NECK: CPT

## 2020-10-24 PROCEDURE — 700117 HCHG RX CONTRAST REV CODE 255: Performed by: EMERGENCY MEDICINE

## 2020-10-24 PROCEDURE — 83036 HEMOGLOBIN GLYCOSYLATED A1C: CPT

## 2020-10-24 PROCEDURE — 86901 BLOOD TYPING SEROLOGIC RH(D): CPT

## 2020-10-24 PROCEDURE — A9270 NON-COVERED ITEM OR SERVICE: HCPCS | Performed by: STUDENT IN AN ORGANIZED HEALTH CARE EDUCATION/TRAINING PROGRAM

## 2020-10-24 PROCEDURE — 94760 N-INVAS EAR/PLS OXIMETRY 1: CPT

## 2020-10-24 PROCEDURE — 93005 ELECTROCARDIOGRAM TRACING: CPT | Performed by: EMERGENCY MEDICINE

## 2020-10-24 PROCEDURE — 73130 X-RAY EXAM OF HAND: CPT | Mod: RT

## 2020-10-24 PROCEDURE — 82607 VITAMIN B-12: CPT

## 2020-10-24 PROCEDURE — 85610 PROTHROMBIN TIME: CPT

## 2020-10-24 PROCEDURE — 85730 THROMBOPLASTIN TIME PARTIAL: CPT

## 2020-10-24 PROCEDURE — 86780 TREPONEMA PALLIDUM: CPT

## 2020-10-24 PROCEDURE — 770020 HCHG ROOM/CARE - TELE (206)

## 2020-10-24 PROCEDURE — G0475 HIV COMBINATION ASSAY: HCPCS

## 2020-10-24 PROCEDURE — 80053 COMPREHEN METABOLIC PANEL: CPT

## 2020-10-24 PROCEDURE — 84484 ASSAY OF TROPONIN QUANT: CPT

## 2020-10-24 PROCEDURE — 87040 BLOOD CULTURE FOR BACTERIA: CPT

## 2020-10-24 PROCEDURE — 84443 ASSAY THYROID STIM HORMONE: CPT

## 2020-10-24 RX ORDER — ACETAMINOPHEN 325 MG/1
650 TABLET ORAL EVERY 4 HOURS PRN
Status: DISCONTINUED | OUTPATIENT
Start: 2020-10-24 | End: 2020-10-26 | Stop reason: HOSPADM

## 2020-10-24 RX ORDER — OXYCODONE HYDROCHLORIDE 5 MG/1
5 TABLET ORAL EVERY 4 HOURS PRN
Status: DISCONTINUED | OUTPATIENT
Start: 2020-10-24 | End: 2020-10-26 | Stop reason: HOSPADM

## 2020-10-24 RX ORDER — ASPIRIN 325 MG
325 TABLET ORAL DAILY
Status: DISCONTINUED | OUTPATIENT
Start: 2020-10-25 | End: 2020-10-26 | Stop reason: HOSPADM

## 2020-10-24 RX ORDER — ASPIRIN 300 MG/1
300 SUPPOSITORY RECTAL DAILY
Status: DISCONTINUED | OUTPATIENT
Start: 2020-10-25 | End: 2020-10-26 | Stop reason: HOSPADM

## 2020-10-24 RX ORDER — ATORVASTATIN CALCIUM 80 MG/1
80 TABLET, FILM COATED ORAL EVERY EVENING
Status: DISCONTINUED | OUTPATIENT
Start: 2020-10-24 | End: 2020-10-26 | Stop reason: HOSPADM

## 2020-10-24 RX ORDER — BUDESONIDE AND FORMOTEROL FUMARATE DIHYDRATE 160; 4.5 UG/1; UG/1
2 AEROSOL RESPIRATORY (INHALATION) 2 TIMES DAILY
Status: DISCONTINUED | OUTPATIENT
Start: 2020-10-25 | End: 2020-10-26 | Stop reason: HOSPADM

## 2020-10-24 RX ORDER — ONDANSETRON 2 MG/ML
4 INJECTION INTRAMUSCULAR; INTRAVENOUS EVERY 4 HOURS PRN
Status: DISCONTINUED | OUTPATIENT
Start: 2020-10-24 | End: 2020-10-26 | Stop reason: HOSPADM

## 2020-10-24 RX ORDER — CYCLOBENZAPRINE HCL 10 MG
10 TABLET ORAL 2 TIMES DAILY PRN
Status: DISCONTINUED | OUTPATIENT
Start: 2020-10-24 | End: 2020-10-26 | Stop reason: HOSPADM

## 2020-10-24 RX ORDER — NORTRIPTYLINE HYDROCHLORIDE 10 MG/1
10 CAPSULE ORAL
Status: DISCONTINUED | OUTPATIENT
Start: 2020-10-24 | End: 2020-10-25

## 2020-10-24 RX ORDER — ASPIRIN 81 MG/1
324 TABLET, CHEWABLE ORAL DAILY
Status: DISCONTINUED | OUTPATIENT
Start: 2020-10-25 | End: 2020-10-26 | Stop reason: HOSPADM

## 2020-10-24 RX ADMIN — ATORVASTATIN CALCIUM 80 MG: 80 TABLET, FILM COATED ORAL at 21:09

## 2020-10-24 RX ADMIN — ACETAMINOPHEN 650 MG: 325 TABLET, FILM COATED ORAL at 21:09

## 2020-10-24 RX ADMIN — IOHEXOL 120 ML: 350 INJECTION, SOLUTION INTRAVENOUS at 18:14

## 2020-10-24 RX ADMIN — NORTRIPTYLINE HYDROCHLORIDE 10 MG: 10 CAPSULE ORAL at 21:09

## 2020-10-24 ASSESSMENT — LIFESTYLE VARIABLES
TOTAL SCORE: 0
AVERAGE NUMBER OF DAYS PER WEEK YOU HAVE A DRINK CONTAINING ALCOHOL: 0
ON A TYPICAL DAY WHEN YOU DRINK ALCOHOL HOW MANY DRINKS DO YOU HAVE: 0
TOTAL SCORE: 0
HAVE PEOPLE ANNOYED YOU BY CRITICIZING YOUR DRINKING: NO
DOES PATIENT WANT TO STOP DRINKING: NO
ALCOHOL_USE: NO
HOW MANY TIMES IN THE PAST YEAR HAVE YOU HAD 5 OR MORE DRINKS IN A DAY: 0
CONSUMPTION TOTAL: NEGATIVE
EVER FELT BAD OR GUILTY ABOUT YOUR DRINKING: NO
HAVE YOU EVER FELT YOU SHOULD CUT DOWN ON YOUR DRINKING: NO
TOTAL SCORE: 0
EVER HAD A DRINK FIRST THING IN THE MORNING TO STEADY YOUR NERVES TO GET RID OF A HANGOVER: NO

## 2020-10-24 ASSESSMENT — FIBROSIS 4 INDEX: FIB4 SCORE: 1.02

## 2020-10-24 ASSESSMENT — PAIN DESCRIPTION - PAIN TYPE: TYPE: ACUTE PAIN

## 2020-10-25 ENCOUNTER — APPOINTMENT (OUTPATIENT)
Dept: RADIOLOGY | Facility: MEDICAL CENTER | Age: 60
DRG: 064 | End: 2020-10-25
Attending: STUDENT IN AN ORGANIZED HEALTH CARE EDUCATION/TRAINING PROGRAM
Payer: COMMERCIAL

## 2020-10-25 ENCOUNTER — APPOINTMENT (OUTPATIENT)
Dept: CARDIOLOGY | Facility: MEDICAL CENTER | Age: 60
DRG: 064 | End: 2020-10-25
Attending: STUDENT IN AN ORGANIZED HEALTH CARE EDUCATION/TRAINING PROGRAM
Payer: COMMERCIAL

## 2020-10-25 PROBLEM — G93.40 ENCEPHALOPATHY: Status: ACTIVE | Noted: 2020-10-24

## 2020-10-25 LAB
AMPHET UR QL SCN: NEGATIVE
ANION GAP SERPL CALC-SCNC: 6 MMOL/L (ref 7–16)
APPEARANCE UR: CLEAR
BACTERIA #/AREA URNS HPF: NEGATIVE /HPF
BARBITURATES UR QL SCN: NEGATIVE
BENZODIAZ UR QL SCN: POSITIVE
BILIRUB UR QL STRIP.AUTO: NEGATIVE
BUN SERPL-MCNC: 9 MG/DL (ref 8–22)
BZE UR QL SCN: POSITIVE
CALCIUM SERPL-MCNC: 8.7 MG/DL (ref 8.5–10.5)
CANNABINOIDS UR QL SCN: NEGATIVE
CHLORIDE SERPL-SCNC: 103 MMOL/L (ref 96–112)
CHOLEST SERPL-MCNC: 159 MG/DL (ref 100–199)
CO2 SERPL-SCNC: 29 MMOL/L (ref 20–33)
COLOR UR: YELLOW
CREAT SERPL-MCNC: 0.61 MG/DL (ref 0.5–1.4)
EPI CELLS #/AREA URNS HPF: ABNORMAL /HPF
ERYTHROCYTE [DISTWIDTH] IN BLOOD BY AUTOMATED COUNT: 46 FL (ref 35.9–50)
GLUCOSE SERPL-MCNC: 114 MG/DL (ref 65–99)
GLUCOSE UR STRIP.AUTO-MCNC: NEGATIVE MG/DL
HCT VFR BLD AUTO: 39.7 % (ref 37–47)
HDLC SERPL-MCNC: 59 MG/DL
HGB BLD-MCNC: 12.4 G/DL (ref 12–16)
HYALINE CASTS #/AREA URNS LPF: ABNORMAL /LPF
KETONES UR STRIP.AUTO-MCNC: NEGATIVE MG/DL
LDLC SERPL CALC-MCNC: 81 MG/DL
LEUKOCYTE ESTERASE UR QL STRIP.AUTO: NEGATIVE
LV EJECT FRACT  99904: 60
LV EJECT FRACT MOD 2C 99903: 62.88
LV EJECT FRACT MOD 4C 99902: 57.27
LV EJECT FRACT MOD BP 99901: 59.59
MAGNESIUM SERPL-MCNC: 2.2 MG/DL (ref 1.5–2.5)
MCH RBC QN AUTO: 29.3 PG (ref 27–33)
MCHC RBC AUTO-ENTMCNC: 31.2 G/DL (ref 33.6–35)
MCV RBC AUTO: 93.9 FL (ref 81.4–97.8)
METHADONE UR QL SCN: NEGATIVE
MICRO URNS: ABNORMAL
NITRITE UR QL STRIP.AUTO: NEGATIVE
OPIATES UR QL SCN: NEGATIVE
OXYCODONE UR QL SCN: POSITIVE
PCP UR QL SCN: NEGATIVE
PH UR STRIP.AUTO: 5 [PH] (ref 5–8)
PHOSPHATE SERPL-MCNC: 3.4 MG/DL (ref 2.5–4.5)
PLATELET # BLD AUTO: 331 K/UL (ref 164–446)
PMV BLD AUTO: 10.2 FL (ref 9–12.9)
POTASSIUM SERPL-SCNC: 3.4 MMOL/L (ref 3.6–5.5)
PROPOXYPH UR QL SCN: NEGATIVE
PROT UR QL STRIP: NEGATIVE MG/DL
RBC # BLD AUTO: 4.23 M/UL (ref 4.2–5.4)
RBC # URNS HPF: ABNORMAL /HPF
RBC UR QL AUTO: ABNORMAL
SARS-COV-2 RNA RESP QL NAA+PROBE: NOTDETECTED
SODIUM SERPL-SCNC: 138 MMOL/L (ref 135–145)
SP GR UR REFRACTOMETRY: >1.045
SPECIMEN SOURCE: NORMAL
TRIGL SERPL-MCNC: 93 MG/DL (ref 0–149)
UROBILINOGEN UR STRIP.AUTO-MCNC: 0.2 MG/DL
WBC # BLD AUTO: 5.7 K/UL (ref 4.8–10.8)
WBC #/AREA URNS HPF: ABNORMAL /HPF

## 2020-10-25 PROCEDURE — 97162 PT EVAL MOD COMPLEX 30 MIN: CPT

## 2020-10-25 PROCEDURE — 80061 LIPID PANEL: CPT

## 2020-10-25 PROCEDURE — 81001 URINALYSIS AUTO W/SCOPE: CPT

## 2020-10-25 PROCEDURE — 83735 ASSAY OF MAGNESIUM: CPT

## 2020-10-25 PROCEDURE — 85027 COMPLETE CBC AUTOMATED: CPT

## 2020-10-25 PROCEDURE — 700111 HCHG RX REV CODE 636 W/ 250 OVERRIDE (IP): Performed by: STUDENT IN AN ORGANIZED HEALTH CARE EDUCATION/TRAINING PROGRAM

## 2020-10-25 PROCEDURE — 87086 URINE CULTURE/COLONY COUNT: CPT

## 2020-10-25 PROCEDURE — 700111 HCHG RX REV CODE 636 W/ 250 OVERRIDE (IP): Performed by: PSYCHIATRY & NEUROLOGY

## 2020-10-25 PROCEDURE — 80048 BASIC METABOLIC PNL TOTAL CA: CPT

## 2020-10-25 PROCEDURE — A9270 NON-COVERED ITEM OR SERVICE: HCPCS | Performed by: PSYCHIATRY & NEUROLOGY

## 2020-10-25 PROCEDURE — 84100 ASSAY OF PHOSPHORUS: CPT

## 2020-10-25 PROCEDURE — 770006 HCHG ROOM/CARE - MED/SURG/GYN SEMI*

## 2020-10-25 PROCEDURE — 99232 SBSQ HOSP IP/OBS MODERATE 35: CPT | Performed by: NURSE PRACTITIONER

## 2020-10-25 PROCEDURE — 700102 HCHG RX REV CODE 250 W/ 637 OVERRIDE(OP): Performed by: PSYCHIATRY & NEUROLOGY

## 2020-10-25 PROCEDURE — 99233 SBSQ HOSP IP/OBS HIGH 50: CPT | Performed by: INTERNAL MEDICINE

## 2020-10-25 PROCEDURE — 93306 TTE W/DOPPLER COMPLETE: CPT

## 2020-10-25 PROCEDURE — 97166 OT EVAL MOD COMPLEX 45 MIN: CPT

## 2020-10-25 PROCEDURE — 700102 HCHG RX REV CODE 250 W/ 637 OVERRIDE(OP): Performed by: STUDENT IN AN ORGANIZED HEALTH CARE EDUCATION/TRAINING PROGRAM

## 2020-10-25 PROCEDURE — 93306 TTE W/DOPPLER COMPLETE: CPT | Mod: 26 | Performed by: INTERNAL MEDICINE

## 2020-10-25 PROCEDURE — 80307 DRUG TEST PRSMV CHEM ANLYZR: CPT

## 2020-10-25 PROCEDURE — 36415 COLL VENOUS BLD VENIPUNCTURE: CPT

## 2020-10-25 PROCEDURE — A9270 NON-COVERED ITEM OR SERVICE: HCPCS | Performed by: STUDENT IN AN ORGANIZED HEALTH CARE EDUCATION/TRAINING PROGRAM

## 2020-10-25 RX ORDER — NORTRIPTYLINE HYDROCHLORIDE 25 MG/1
25 CAPSULE ORAL
Status: DISCONTINUED | OUTPATIENT
Start: 2020-10-25 | End: 2020-10-26 | Stop reason: HOSPADM

## 2020-10-25 RX ORDER — FLUOXETINE HYDROCHLORIDE 20 MG/1
20 CAPSULE ORAL DAILY
Status: DISCONTINUED | OUTPATIENT
Start: 2020-10-25 | End: 2020-10-25

## 2020-10-25 RX ORDER — LORAZEPAM 2 MG/ML
1 INJECTION INTRAMUSCULAR
Status: COMPLETED | OUTPATIENT
Start: 2020-10-25 | End: 2020-10-25

## 2020-10-25 RX ORDER — LORAZEPAM 2 MG/ML
1 INJECTION INTRAMUSCULAR ONCE
Status: DISPENSED | OUTPATIENT
Start: 2020-10-25 | End: 2020-10-26

## 2020-10-25 RX ORDER — LORAZEPAM 2 MG/ML
1 INJECTION INTRAMUSCULAR ONCE
Status: COMPLETED | OUTPATIENT
Start: 2020-10-25 | End: 2020-10-25

## 2020-10-25 RX ADMIN — OXYCODONE 5 MG: 5 TABLET ORAL at 14:01

## 2020-10-25 RX ADMIN — ATORVASTATIN CALCIUM 80 MG: 80 TABLET, FILM COATED ORAL at 17:02

## 2020-10-25 RX ADMIN — CYCLOBENZAPRINE 10 MG: 10 TABLET, FILM COATED ORAL at 04:54

## 2020-10-25 RX ADMIN — OXYCODONE 5 MG: 5 TABLET ORAL at 18:18

## 2020-10-25 RX ADMIN — CYCLOBENZAPRINE 10 MG: 10 TABLET, FILM COATED ORAL at 19:25

## 2020-10-25 RX ADMIN — OXYCODONE 5 MG: 5 TABLET ORAL at 23:09

## 2020-10-25 RX ADMIN — ENOXAPARIN SODIUM 40 MG: 40 INJECTION SUBCUTANEOUS at 04:53

## 2020-10-25 RX ADMIN — OXYCODONE 5 MG: 5 TABLET ORAL at 02:23

## 2020-10-25 RX ADMIN — ACETAMINOPHEN 650 MG: 325 TABLET, FILM COATED ORAL at 19:27

## 2020-10-25 RX ADMIN — ASPIRIN 324 MG: 81 TABLET, CHEWABLE ORAL at 04:53

## 2020-10-25 RX ADMIN — LORAZEPAM 1 MG: 2 INJECTION INTRAMUSCULAR; INTRAVENOUS at 13:58

## 2020-10-25 RX ADMIN — ACETAMINOPHEN 650 MG: 325 TABLET, FILM COATED ORAL at 04:54

## 2020-10-25 RX ADMIN — BUDESONIDE AND FORMOTEROL FUMARATE DIHYDRATE 2 PUFF: 160; 4.5 AEROSOL RESPIRATORY (INHALATION) at 17:02

## 2020-10-25 RX ADMIN — LORAZEPAM 1 MG: 2 INJECTION INTRAMUSCULAR; INTRAVENOUS at 15:40

## 2020-10-25 RX ADMIN — BUDESONIDE AND FORMOTEROL FUMARATE DIHYDRATE 2 PUFF: 160; 4.5 AEROSOL RESPIRATORY (INHALATION) at 05:03

## 2020-10-25 RX ADMIN — NORTRIPTYLINE HYDROCHLORIDE 25 MG: 25 CAPSULE ORAL at 20:44

## 2020-10-25 ASSESSMENT — COGNITIVE AND FUNCTIONAL STATUS - GENERAL
TURNING FROM BACK TO SIDE WHILE IN FLAT BAD: A LITTLE
MOVING TO AND FROM BED TO CHAIR: A LITTLE
MOVING TO AND FROM BED TO CHAIR: A LOT
SUGGESTED CMS G CODE MODIFIER DAILY ACTIVITY: CK
CLIMB 3 TO 5 STEPS WITH RAILING: A LOT
EATING MEALS: A LITTLE
STANDING UP FROM CHAIR USING ARMS: A LITTLE
DRESSING REGULAR LOWER BODY CLOTHING: A LITTLE
SUGGESTED CMS G CODE MODIFIER DAILY ACTIVITY: CL
CLIMB 3 TO 5 STEPS WITH RAILING: TOTAL
DAILY ACTIVITIY SCORE: 18
TOILETING: A LOT
DRESSING REGULAR UPPER BODY CLOTHING: A LITTLE
TURNING FROM BACK TO SIDE WHILE IN FLAT BAD: A LITTLE
HELP NEEDED FOR BATHING: A LITTLE
WALKING IN HOSPITAL ROOM: A LOT
DAILY ACTIVITIY SCORE: 13
PERSONAL GROOMING: A LITTLE
MOVING FROM LYING ON BACK TO SITTING ON SIDE OF FLAT BED: A LOT
DRESSING REGULAR UPPER BODY CLOTHING: A LOT
MOBILITY SCORE: 14
WALKING IN HOSPITAL ROOM: A LOT
SUGGESTED CMS G CODE MODIFIER MOBILITY: CL
PERSONAL GROOMING: A LOT
EATING MEALS: A LITTLE
SUGGESTED CMS G CODE MODIFIER MOBILITY: CL
MOVING FROM LYING ON BACK TO SITTING ON SIDE OF FLAT BED: A LOT
DRESSING REGULAR LOWER BODY CLOTHING: A LOT
STANDING UP FROM CHAIR USING ARMS: A LOT
HELP NEEDED FOR BATHING: A LOT
MOBILITY SCORE: 13
TOILETING: A LITTLE

## 2020-10-25 ASSESSMENT — GAIT ASSESSMENTS
ASSISTIVE DEVICE: HAND HELD ASSIST
DISTANCE (FEET): 10
DEVIATION: SHUFFLED GAIT;OTHER (COMMENT)
GAIT LEVEL OF ASSIST: MODERATE ASSIST

## 2020-10-25 ASSESSMENT — FIBROSIS 4 INDEX: FIB4 SCORE: 0.83

## 2020-10-25 ASSESSMENT — ACTIVITIES OF DAILY LIVING (ADL): TOILETING: INDEPENDENT

## 2020-10-25 NOTE — PROGRESS NOTES
MRI called this RN and said that pt has a neuro stimulator which is compatible with their MRI machine, but that they'll need to have the remote in hand and fully charged in order to complete MRI.  I asked the patient if she had the remote with her here in the hospital, and she does not.  I attempted to call her daughter Jo, but call went to voicemail and the box was full.  I then called pt's other daughter, Carline, but call also went to voicemail.  Message was left for Carline to call this RN back regarding neuro stimulator.  Awaiting call-back at this time.    1050: Carline called this RN back. She stated she will go to her mom's and look for the neuro stimulator/deliver it to us.  Carline also asked about pt's UDS.  RN last night told her that we were going to be obtaining one, and Carline expressed curiosity regarding results.  She said she's been wondering if her mom has been using any substances lately, acting odd and intermittently lethargic.  I told Carline that I could not discuss results as the RN and that the pt would have to give consent first, to discussing results.  But I paged Dr. Stanford to call Carline for update.

## 2020-10-25 NOTE — ASSESSMENT & PLAN NOTE
Patient with a history of chronic back and neck pain.    Has a spinal cord stimulation for pain control.  Takes oxycodone/gabapentin/Flexeril/nortriptyline outpatient for pain control.    Restart patient on home pain medications  Closely monitor mental status.  Avoid over sedating with opioids.

## 2020-10-25 NOTE — PROGRESS NOTES
Pt arrived to unit.     2 RN skin check completed with DAREN Rivero.  Sacrum pink and blanching.  Feet dry, flaky and old dry blood noted.   R arm edematous.

## 2020-10-25 NOTE — THERAPY
Physical Therapy   Initial Evaluation     Patient Name: Elina Skaggs  Age:  60 y.o., Sex:  female  Medical Record #: [id number]  Today's Date: 10/25/2020     Precautions: Fall Risk    Assessment  Patient is 60 y.o. female admitted for stroke workup due to R UE weakness. PMH includes HTN, chronic back pain with spinal cord stimulatory, opioid therapy, asthma, anxiety.  CT and CTA of the head and neck showed no acute infarcts. Pt is pending MRI. Pt agreeable to work with therapy but tearful throughout and perseverating on her UDS showing cocaine when she declined use. Pt c/o R hand pain and weakness, no LE weakness noted but mild coordination deficits which may be baseline. Pt required min to mod assist ambulating ~10ft with flexed knees and HHA. Pt is limited in mobility due to balance deficits, coordination, deficits, and cognitive deficits. PT will cont while in acute care setting.     Plan    Recommend Physical Therapy 4 times per week until therapy goals are met for the following treatments:  Bed Mobility, Community Re-integration, Equipment, Neuro Re-Education / Balance, Self Care/Home Evaluation, Stair Training, Therapeutic Activities and Therapeutic Exercises    DC Equipment Recommendations: Unable to determine at this time  Discharge Recommendations: Recommend post-acute placement for additional physical therapy services prior to discharge home          10/25/20 0944   Prior Living Situation   Prior Services None   Housing / Facility 3 Story House   Steps Into Home 5   Steps In Home   (2 small flights. Pt stays on main floor)   Equipment Owned Single Point Cane;Front-Wheel Walker   Lives with - Patient's Self Care Capacity Adult Children;Child Less than 18 Years of Age   Comments pt lives with her 2 adult children and grandchild   Prior Level of Functional Mobility   Bed Mobility Independent   Transfer Status Independent   Ambulation Independent   Distance Ambulation (Feet)   (community)   Assistive Devices  Used Single Point Cane   Stairs Independent   Comments pt reports being independent prior   Cognition    Cognition / Consciousness X   Level of Consciousness Alert   Comments very emotionally labile and crying throughout session. Pt perseverating on her urine test showing cocaine and she hasnt done cocaine since the 70s   Coordination Lower Body    Coordination Lower Body  X   Toe Tapping Right Impaired   Toe Tapping Left Impaired   Comments impaired toe tapping   Balance Assessment   Sitting Balance (Static) Fair   Sitting Balance (Dynamic) Fair   Standing Balance (Static) Fair -   Standing Balance (Dynamic) Poor +   Weight Shift Sitting Fair   Weight Shift Standing Poor   Comments HHA x2 in standing   Gait Analysis   Gait Level Of Assist Moderate Assist   Assistive Device Hand Held Assist   Distance (Feet) 10   # of Times Distance was Traveled 2   Deviation Shuffled Gait;Other (Comment)  (significant B knee flexion when ambulating deaspite cues)   # of Stairs Climbed 0   Weight Bearing Status fwb   Comments LOB while at since and pt required encouragement to ambulate   Bed Mobility    Supine to Sit Minimal Assist   Sit to Supine Minimal Assist   Scooting Supervised   Rolling Supervised   Functional Mobility   Sit to Stand Minimal Assist   Bed, Chair, Wheelchair Transfer Minimal Assist   Mobility in room with HHA x2   Short Term Goals    Short Term Goal # 1 Pt will be able to demonstrate bed mobility from flat bed with SPV in 6tx in order to return home   Short Term Goal # 2 Pt will be able to complete STS with LRAD and SPV in 6tx in order to return home   Short Term Goal # 3 Pt will be able to ambulate >50ft with LRAD and SPV in 6tx in order to return home   Short Term Goal # 4 Pt will be able to negotiate 5 steps with SPV in 6tx in order to enter and exit her home   Anticipated Discharge Equipment and Recommendations   DC Equipment Recommendations Unable to determine at this time   Discharge Recommendations  Recommend post-acute placement for additional physical therapy services prior to discharge home

## 2020-10-25 NOTE — PROGRESS NOTES
Neurology Progress Note  Neurohospitalist Service, [facility]    Referring Physician: Jason Stanford M.D.    Chief Complaint   Patient presents with   • Possible Stroke     Pt LKW was 0130 when she spoke with her son.  She slept until 1530, and awaoke with R arm swelling, numbness and weakness.  PT also demonstrates ataxia and B LE weakness.         HPI: Refer to initial documented Neurology H&P, as detailed in the patient's chart.    Interval History 10/25/2020: No acute events since admission overnight. Patient currently OOB with PT/OT for evaluation, requiring moderate 1 person assistance given ataxia. She is very anxious, distractible, and intermittently tearful during exam stating recent family health problems, but following commands with redirection, and oriented to person, place, situation, but not time. Complains of right sided weakness and chronic lower back pain. She denies headaches, acute visual changes, facial droopiness, muscle weakness (focal or generalized), paresthesias, anesthesia, ataxia, memory loss, abnormal movements, seizures, loss of consciousness, or episodes of confusion.      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 Freeman   • Asthma     inhalers     • Chronic back pain    • Constipation 9/27/2013   • Constipation    • Cough variant asthma    • Dental disorder     dentures uppers   • Heart burn    • Hyperlipidemia    • Hypertension    • Impaired physical mobility 9/30/2013   • Lung nodule < 6cm on CT 12/2015   • MEDICAL HOME 6/2015   • Psychiatric problem     depression   • Rheumatoid arteritis (HCC)     Fingers   • Sleep apnea    • Snoring         FHx:  Family History   Problem Relation Age of Onset   • Cancer Mother         lung cancer   • Heart Disease Father         MI   • Diabetes Father    • Genitourinary ()  Problems Father         renal failure on dialysis   • Diabetes Sister    • Genitourinary () Problems Sister         renal failure   • Diabetes Brother         SHx:  Social History     Socioeconomic History   • Marital status: Legally      Spouse name: Not on file   • Number of children: Not on file   • Years of education: Not on file   • Highest education level: Not on file   Occupational History   • Occupation: [employer]     Employer: [employer]   Social Needs   • Financial resource strain: Not on file   • Food insecurity     Worry: Not on file     Inability: Not on file   • Transportation needs     Medical: Not on file     Non-medical: Not on file   Tobacco Use   • Smoking status: Current Some Day Smoker     Packs/day: 0.10     Years: 37.00     Pack years: 3.70     Types: Cigarettes   • Smokeless tobacco: Never Used   • Tobacco comment: she has n ot smoked in 2 weeks   Substance and Sexual Activity   • Alcohol use: No   • Drug use: No   • Sexual activity: Never     Partners: Male     Birth control/protection: Surgical   Lifestyle   • Physical activity     Days per week: Not on file     Minutes per session: Not on file   • Stress: Not on file   Relationships   • Social connections     Talks on phone: Not on file     Gets together: Not on file     Attends Judaism service: Not on file     Active member of club or organization: Not on file     Attends meetings of clubs or organizations: Not on file     Relationship status: Not on file   • Intimate partner violence     Fear of current or ex partner: Not on file     Emotionally abused: Not on file     Physically abused: Not on file     Forced sexual activity: Not on file   Other Topics Concern   • Not on file   Social History Narrative    ** Merged History Encounter **             Medications:    Current Facility-Administered Medications:   •  LORazepam (ATIVAN) injection 1 mg, 1 mg, Intravenous, MRI Once, Bhaskar Castillo M.D.  •   nortriptyline (PAMELOR) capsule 25 mg, 25 mg, Oral, QHS, Bhaskar Castillo M.D.  •  LORazepam (ATIVAN) injection 1 mg, 1 mg, Intravenous, Once, Bhaskar Castillo M.D., Stopped at 10/25/20 1039  •  atorvastatin (LIPITOR) tablet 80 mg, 80 mg, Oral, Q EVENING, [place] Aig-Ojeanor, D.O., 80 mg at 10/24/20 2109  •  cyclobenzaprine (Flexeril) tablet 10 mg, 10 mg, Oral, BID PRN, [place]elton Colladog-Maria Victoriaanor, D.O., 10 mg at 10/25/20 0454  •  Pharmacy consult request - Allow for permissive hypertension: SBP up to 220 mmHg/DBP up to 120 mmHg x 48 hours, , Other, PHARMACY TO DOSE, Maykel Colladog-Maria Victoriaanor, D.O.  •  aspirin (ASA) tablet 325 mg, 325 mg, Oral, DAILY **OR** aspirin (ASA) chewable tab 324 mg, 324 mg, Oral, DAILY, 324 mg at 10/25/20 0453 **OR** aspirin (ASA) suppository 300 mg, 300 mg, Rectal, DAILY, [place] Aig-NINOjeanor, D.O.  •  budesonide-formoterol (SYMBICORT) 160-4.5 MCG/ACT inhaler 2 Puff, 2 Puff, Inhalation, BID, Fortune Aig-Ojeanor, D.O., 2 Puff at 10/25/20 0503  •  oxyCODONE immediate-release (ROXICODONE) tablet 5 mg, 5 mg, Oral, Q4HRS PRN, Fortune Aig-Ojeanor, D.O., 5 mg at 10/25/20 0223  •  acetaminophen (TYLENOL) tablet 650 mg, 650 mg, Oral, Q4HRS PRN, Fortune Aig-Ojeanor, D.O., 650 mg at 10/25/20 0454  •  ondansetron (ZOFRAN) syringe/vial injection 4 mg, 4 mg, Intravenous, Q4HRS PRN, Fortune Aig-Ojeanor, D.O.  •  enoxaparin (LOVENOX) inj 40 mg, 40 mg, Subcutaneous, DAILY, Maykel Guido D.O., 40 mg at 10/25/20 0451    Allergies:  Allergies   Allergen Reactions   • Ambien [Zolpidem]      Sleep walking   • Cymbalta [Duloxetine Hcl] Rash and Itching     Redness to anterior neck.        Review of systems: In addition to what is detailed in the HPI and interval history above, all other systems reviewed and are negative.      Physical Examination:   Vitals:    10/25/20 0359 10/25/20 0400 10/25/20 0800 10/25/20 1200   BP: 130/69  113/81 117/83   Pulse: 86  84 83   Resp: 16  16 16   Temp: 36.2 °C (97.1 °F)  36.6 °C (97.8  "°F) 36.4 °C (97.5 °F)   TempSrc: Temporal  Temporal Temporal   SpO2: 95%  93% 99%   Weight:  67.1 kg (148 lb)     Height:  1.626 m (5' 4\")       General: Patient in mild emotional distress, distractible but cooperative with redirection.  HEENT: Normocephalic, no signs of acute trauma.   Neck: Supple, no meningeal signs or carotid bruits. There is normal range of motion. No tenderness on exam.   Chest: Clear to diminished with crackles to bases to auscultation. Productive cough.   CV: RRR, no murmurs.   Skin: No signs of acute rashes or trauma. Multiple fissures to bilateral fingers and toes and poor hygiene.  Musculoskeletal: joints exhibit full range of motion, without any pain to palpation. Chronic lower back pain with movement. There are no signs of joint or muscle swelling. There is no tenderness to deep palpation of muscles.   Psychiatric: No hallucinatory behavior. Endorses symptoms of depression and anxiety, but no suicidal ideation. Mood and affect appear anxious and tearful on exam.     NEUROLOGICAL EXAM:   Mental status, orientation: Awake, alert, following commands with redirection to attend, and oriented to person, place, situation, year, but not month (stated September).   Speech and language: Speech is mildly dysarthric and fluent. The patient is able to name , repeat with one phonemic paraphasic error, and comprehend.   Memory: There is intact recollection of recent and remote events.   Cranial nerve exam: Pupils are 3-4 mm bilaterally and equally reactive to light and accommodation. Visual fields are intact by confrontation. There is no nystagmus on primary or secondary gaze. Intact full EOM in all directions of gaze. Face appears symmetric. Sensation in the face is intact to light touch. Uvula is midline. Palate elevates symmetrically. Tongue is midline and without any signs of tongue biting or fasciculations. Sternocleidomastoid muscles exhibit is normal strength bilaterally. Shoulder shrug is " intact bilaterally.   Motor exam: Strength is 5/5 in LUE, 4-/5 RUE and RLE with drift, 4+/5 in LLE with some effort dependence with chronic lower back pain. Tone is normal. No abnormal movements were seen on exam.   Sensory exam Reveals decreased sense of light touch and pinprick in RUE and RLE.   Deep tendon reflexes:  2+ throughout. Plantar responses are flexor. There is no clonus.   Coordination: Shows a mild ataxia to right with finger-nose-finger. Normal rapidly alternating movements.   Gait: The patient was able to get up from seated position on first attempt requiring assistance moderate 1 person assistance. Found to be unsteady when walking with imbalance in both directions. Movements were of short cautious steps with arms holding onto PT/OT for balance. The patient was able to turn without difficulties or tendency to fall. Romberg examination positive.     NIH Stroke Scale  10/25/20    1a. Level of Consciousness (Alert, drowsy, etc): 0= Alert    1b. LOC Questions (Month, age): 1= Answers one correctly    1c. LOC Commands (Open/close eyes make fist/let go): 0= Obeys both correctly    2.   Best Gaze (Eyes open - patient follows examiner's finger on face): 0= Normal    3.   Visual Fields (introduce visual stimulus/threat to patient's field quadrants): 0= No visual loss    4.   Facial Paresis (Show teeth, raise eyebrows and squeeze eyes shut): 0= Normal     5a. Motor Arm - Left (Elevate arm to 90 degrees if patient is sitting, 45 degrees if  supine): 0= No drift    5b. Motor Arm - Right (Elevate arm to 90 degrees if patient is sitting, 45 degrees if supine): 1= Drift    6a. Motor Leg - Left (Elevate leg 30 degrees with patient supine): 0= No drift    6b. Motor Leg - Right  (Elevate leg 30 degrees with patient supine): 1= Drift    7.   Limb Ataxia (Finger-nose, heel down shin): 2- Present in 2 limbs    8.   Sensory (Pin prick to face, arm, trunk and leg - compare side to side): 1= Partial loss    9.  Best  Language (Name item, describe a picture and read sentences): 1= Mild to moderate aphasia    10. Dysarthria (Evaluate speech clarity by patient repeating listed words): 1= Mild to moderate slurring    11. Extinction and Inattention (Use information from prior testing to identify neglect or  double simultaneous stimuli testing): 0= No neglect    Total NIH Score: 8    Pre Stroke Modified [place] Scale (MRS): 2 = Slight disability; unable to perform all previous activities but able to look after own affairs without assistance     Presumed Mechanism by TOAST:     Ancillary Data Reviewed:    Labs:  Lab Results   Component Value Date/Time    PROTHROMBTM 13.9 10/24/2020 05:43 PM    INR 1.04 10/24/2020 05:43 PM      Lab Results   Component Value Date/Time    WBC 5.7 10/25/2020 02:10 AM    RBC 4.23 10/25/2020 02:10 AM    HEMOGLOBIN 12.4 10/25/2020 02:10 AM    HEMATOCRIT 39.7 10/25/2020 02:10 AM    MCV 93.9 10/25/2020 02:10 AM    MCH 29.3 10/25/2020 02:10 AM    MCHC 31.2 (L) 10/25/2020 02:10 AM    MPV 10.2 10/25/2020 02:10 AM    NEUTSPOLYS 51.50 10/24/2020 05:43 PM    LYMPHOCYTES 39.40 10/24/2020 05:43 PM    MONOCYTES 5.80 10/24/2020 05:43 PM    EOSINOPHILS 1.90 10/24/2020 05:43 PM    BASOPHILS 1.20 10/24/2020 05:43 PM      Lab Results   Component Value Date/Time    SODIUM 138 10/25/2020 02:10 AM    POTASSIUM 3.4 (L) 10/25/2020 02:10 AM    CHLORIDE 103 10/25/2020 02:10 AM    CO2 29 10/25/2020 02:10 AM    GLUCOSE 114 (H) 10/25/2020 02:10 AM    BUN 9 10/25/2020 02:10 AM    CREATININE 0.61 10/25/2020 02:10 AM    CREATININE 0.7 02/04/2007 03:50 AM      Lab Results   Component Value Date/Time    CHOLSTRLTOT 159 10/25/2020 02:10 AM    LDL 81 10/25/2020 02:10 AM    HDL 59 10/25/2020 02:10 AM    TRIGLYCERIDE 93 10/25/2020 02:10 AM       Lab Results   Component Value Date/Time    ALKPHOSPHAT 120 (H) 10/24/2020 05:43 PM    ASTSGOT 16 10/24/2020 05:43 PM    ALTSGPT 13 10/24/2020 05:43 PM    TBILIRUBIN 0.2 10/24/2020 05:43 PM         Imaging/Testing:    I interpreted and/or reviewed the patient's neuroimaging    EC-ECHOCARDIOGRAM COMPLETE W/O CONT   Final Result      DX-HAND 3+ RIGHT   Final Result      1.  No acute findings.      2.  Mild osteoarthritis of the interphalangeal joints.      DX-CHEST-PORTABLE (1 VIEW)   Final Result      No acute cardiopulmonary abnormality.      CT-CTA NECK WITH & W/O-POST PROCESSING   Final Result      Bilateral carotid atherosclerosis without hemodynamically significant stenosis.      CT-CTA HEAD WITH & W/O-POST PROCESS   Final Result      No significant stenosis of the intracranial arteries.      CT-CEREBRAL PERFUSION ANALYSIS   Final Result    Limited by motion artifact.   1.  Cerebral blood flow less than 30% likely representing completed infarct = 0 mL.      2.  T Max more than 6 seconds likely representing combination of completed infarct and ischemia = 0 mL.      3.  Mismatched volume likely representing ischemic brain/penumbra = None      4.  Please note that the cerebral perfusion was performed on the limited brain tissue around the basal ganglia region. Infarct/ischemia outside the CT perfusion sections can be missed in this study.      CT-HEAD W/O   Final Result      No acute intracranial abnormality.      MR-BRAIN-W/O    (Results Pending)       Assessment:    Elina Skaggs is a 60 y.o. female with relevant history of left ICA aneurysm, anxiety, depression, asthma, chronic back pain with stimulator, hypertension, hyperlipidemia, DORI, tobacco abuse, and arthritis presenting on 10/24/20 for right arm weakness and slurred speech whom neurology was consulted to address possible stroke. NIHSS currently 8 with stable neurological exam significant for disorientation to time, dysarthria, mild expressive aphasia, right upper and lower extremity weakness with drift, ataxia, and decreased sensation. Patient's exam is suggestive of a left hemispheric stroke, likely left MCA territory however with deficits  sparing the face. Of note, the right arm is edematous and erythemic with imaging revealing mild osteoarthritis of the interphalangeal joints. Neuro imaging (CT head without, CTA head and neck with and without, and CT perfusion) thus far is reassuring thus far revealing bilateral carotid atherosclerosis without significant stenosis.  Patient was not a candidate for TPA as she presented outside of the therapeutic window greater than 4.5 hours from time of symptoms onset.  Was also not an IR candidate given no LVO seen on CTAs. Differential etiology for potential acute ischemic stroke includes thromboembolism given vascular risk factors versus cardioembolic versus drug-induced given UDS positive for benzos, cocaine, and oxycodone.    Plan:    -q4h and PRN neuro assessment. VS per nursing/unit protocol. Permissive HTN ok for 24-48 hours, not to exceed SBP > 220, DBP > 105. Then, BP goal < 140/90. Antihypertensives per primary team.   -Obtain MRI Brain wo contrast. Administer Ativan 1mg PRN once for MRI  -Telemetry; currently SR. Screen for Afib/arrhythmia.   -TTE with bubble study showed EF 60%, no gross structural abnormalities including normal left atrial size, and no evidence of right to left shunt.  -Nortriptyline increased to 25 mg PO QHS given depressive symptoms  -Administer Ativan 1mg IV once now for current anxiety  -Continue  mg PO q day   -Continue Atorvastatin 80 mg PO q HS. Note lipid panel LDL 81 (goal <70 with CVA) and HDL 59 (within goal).   -Recommend aggressive BG management per primary team. Avoid IVF with Dextrose. BG goal 140-180. Note hemoglobin A1c 6.6 (goal <6.4 in nondiabetic).   -PT/OT/SLP eval and treat.   -Counseled patient at length regarding life style and risk factor modification for secondary stroke prevention.   -Will follow up with results of the above and make additional recommendations accordingly.  -All other medical management per primary team.   -DVT PPX: SCDs.     17:04  Addendum: Patient has known MRI conditional spinal stimulator implanted. Her family was contracted and brought in stimulator remote in order to communicate with implanted pulse generator. The remote accompanied the patient to radiology for her MRI brain wo contrast today; however, upon troubleshooting by the tech with the  manual, the remote was unable to communicate with the implanted pulse generator (stimulator battery). The patient stated she has not charged it in at least six months. MRI could not be completed until stimulator is turned off.    Updated recommendations:   -MRI brain wo contrast discontinued  -CT head wo contrast to be completed tomorrow en lieu of MRI brain. Order placed  -Communicated changes to bedside RN     The evaluation of the patient, and recommended management, was discussed with Dr. Castillo, Dr. Stanford, and bedside RN. I have performed a physical exam and reviewed and updated ROS and Plan today (10/25/2020). In review of yesterday's note (10/24/2020), there are no changes except as documented above.    FATOU David.P.R.N.   Nurse Practitioner, Neurohospitalist  [facility]  t) [phone] (f) [phone]

## 2020-10-25 NOTE — PROGRESS NOTES
"Patient has a brain without contrast ordered. Patient has a spinal stimulator implanted. This device is MR Conditional but requires stimulation to be turned off before the exam.     Patients family was contacted to bring in patients stimulator remote for exam. This remote had new AAA batteries. Patient was brought down for her MRI exam. The remote was unable to communicate with the implanted pulse generator (stimulator battery). Upon troubleshooting with  manual, tech was able to determine that the remote control could not communicate with the IPG.     When asked if the patient had recently charged the generator, \"patient stated she never has to charge it and it has been at least six months since last time she did\".   The patients spinal stimulator has a dead battery and can not be accessed until it is charged. The MRI exam can not be completed until stimulation is turned off.   RN apprised of this information via phone at [phone]. RN faxed  information on how device is to be charged at [phone].   "

## 2020-10-25 NOTE — PROGRESS NOTES
[facility] Medicine Daily Progress Note    Date of Service  10/25/2020    Chief Complaint  60 y.o. female admitted 10/24/2020 with right upper extremity weakness    Hospital Course    60 y.o. female who presented 10/24/2020 with concerns for possible stroke.  She has a past medical history significant for hypertension, chronic back pain with spinal cord stimulator and on opiate therapy, asthma, anxiety and hyperlipidemia.  Admitted for right upper extremity weakness      Interval Problem Update  The patient still having weakness over her right upper extremity.  Her urine drug is positive for cocaine but the patient refused it.  Waiting for MRI today.    Consultants/Specialty  Neurology    Code Status  Full Code    Disposition  Remain on the floor    Review of Systems  Review of Systems   Unable to perform ROS: Medical condition        Physical Exam  Temp:  [36.2 °C (97.1 °F)-36.9 °C (98.5 °F)] 36.2 °C (97.1 °F)  Pulse:  [79-88] 86  Resp:  [14-16] 16  BP: (102-170)/() 130/69  SpO2:  [91 %-96 %] 95 %    Physical Exam  Vitals signs reviewed.   Constitutional:       General: She is not in acute distress.     Appearance: Normal appearance.   HENT:      Head: Normocephalic and atraumatic.      Nose: No congestion or rhinorrhea.   Eyes:      Extraocular Movements: Extraocular movements intact.      Pupils: Pupils are equal, round, and reactive to light.   Neck:      Musculoskeletal: Normal range of motion and neck supple.   Cardiovascular:      Rate and Rhythm: Normal rate and regular rhythm.      Pulses: Normal pulses.   Pulmonary:      Effort: Pulmonary effort is normal. No respiratory distress.      Breath sounds: Normal breath sounds.   Abdominal:      General: Bowel sounds are normal. There is no distension.      Palpations: Abdomen is soft.      Tenderness: There is no abdominal tenderness.   Musculoskeletal:         General: No swelling or tenderness.   Skin:     General: Skin is warm.      Findings: No  erythema.   Neurological:      General: No focal deficit present.      Mental Status: She is alert.         Fluids  No intake or output data in the 24 hours ending 10/25/20 0844    Laboratory  Recent Labs     10/24/20  1743 10/25/20  0210   WBC 4.3* 5.7   RBC 4.04* 4.23   HEMOGLOBIN 12.0 12.4   HEMATOCRIT 37.9 39.7   MCV 93.8 93.9   MCH 29.7 29.3   MCHC 31.7* 31.2*   RDW 45.1 46.0   PLATELETCT 326 331   MPV 9.7 10.2     Recent Labs     10/24/20  1743 10/25/20  0210   SODIUM 138 138   POTASSIUM 3.6 3.4*   CHLORIDE 100 103   CO2 28 29   GLUCOSE 184* 114*   BUN 10 9   CREATININE 0.74 0.61   CALCIUM 9.0 8.7     Recent Labs     10/24/20  1743   APTT 33.2   INR 1.04         Recent Labs     10/25/20  0210   TRIGLYCERIDE 93   HDL 59   LDL 81       Imaging  DX-HAND 3+ RIGHT   Final Result      1.  No acute findings.      2.  Mild osteoarthritis of the interphalangeal joints.      DX-CHEST-PORTABLE (1 VIEW)   Final Result      No acute cardiopulmonary abnormality.      CT-CTA NECK WITH & W/O-POST PROCESSING   Final Result      Bilateral carotid atherosclerosis without hemodynamically significant stenosis.      CT-CTA HEAD WITH & W/O-POST PROCESS   Final Result      No significant stenosis of the intracranial arteries.      CT-CEREBRAL PERFUSION ANALYSIS   Final Result    Limited by motion artifact.   1.  Cerebral blood flow less than 30% likely representing completed infarct = 0 mL.      2.  T Max more than 6 seconds likely representing combination of completed infarct and ischemia = 0 mL.      3.  Mismatched volume likely representing ischemic brain/penumbra = None      4.  Please note that the cerebral perfusion was performed on the limited brain tissue around the basal ganglia region. Infarct/ischemia outside the CT perfusion sections can be missed in this study.      CT-HEAD W/O   Final Result      No acute intracranial abnormality.      EC-ECHOCARDIOGRAM COMPLETE W/ CONT    (Results Pending)   MR-BRAIN-W/O    (Results  Pending)        Assessment/Plan  * Possible CVA (cerebral vascular accident) (HCC)  Assessment & Plan  CT head, CTA head and neck with no acute infarcts, hemorrhage, mass, aneurysm or LVO.    MRI brain ordered. pending  Permissive hypertension protocol ordered.  Neurology following  ASA.  Lipitor  A1c/lipid profile  PT/OT/SLP  Fall precautions.  Neurochecks    Localized swelling on right hand  Assessment & Plan  Patient complains of swelling of her right hand.  X-ray showed no fracture except osteoarthritis changes  Pain control      Chronic pain syndrome  Assessment & Plan  Patient with a history of chronic back and neck pain.    Has a spinal cord stimulation for pain control.  Takes oxycodone/gabapentin/Flexeril/nortriptyline outpatient for pain control.    Restart patient on home pain medications  Closely monitor mental status.  Avoid over sedating with opioids.     Essential hypertension  Assessment & Plan  We will hold blood pressure in setting of acute CVA.  Reintroduce when appropriate.  Closely monitor blood pressures.    Encephalopathy  Assessment & Plan  Could be related to polypharmacy    MRI of the brain ordered.  Blood and urine culture.  Daily labs.  RPR.  HIV antibody.  Vitamin B12.    Cigarette nicotine dependence without complication- (present on admission)  Assessment & Plan  Counseling provided.       VTE prophylaxis: lovenox

## 2020-10-25 NOTE — THERAPY
Occupational Therapy   Initial Evaluation     Patient Name: Elina Skaggs  Age:  60 y.o., Sex:  female  Medical Record #: [id number]  Today's Date: 10/25/2020     Precautions  Precautions: (P) Fall Risk  Comments: R UE weakness    Assessment  Patient is 60 y.o. female with a diagnosis of Right UE weakness.  Additional factors influencing patient status / progress: family support available at home.      Plan    Recommend Occupational Therapy 3 times per week until therapy goals are met for the following treatments:  Adaptive Equipment, Neuro Re-Education / Balance, Self Care/Activities of Daily Living, Therapeutic Activities and Therapeutic Exercises.    DC Equipment Recommendations: (P) Unable to determine at this time  Discharge Recommendations: (P) Recommend home health for continued occupational therapy services     Subjective    Cooperative, somewhat confused, tearful/ labile during session.     Objective       10/25/20 1005   Total Time Spent   Total Time Spent (Mins) 44   Charge Group   OT Evaluation OT Evaluation Mod   Initial Contact Note    Initial Contact Note Order Received and Verified, Occupational Therapy Evaluation in Progress with Full Report to Follow.   Prior Living Situation   Prior Services None  (family assist as needed)   Housing / Facility 3 Story House  (stays on middle floor)   Steps Into Home 5   Bathroom Set up Bathtub / Shower Combination   Equipment Owned Single Point Cane;Front-Wheel Walker   Lives with - Patient's Self Care Capacity Adult Children   Comments daughter works [employer], son in law works during the day, so there is usually someone there with her   Prior Level of ADL Function   Self Feeding Independent   Grooming / Hygiene Independent   Bathing Independent   Dressing Independent   Toileting Independent   Prior Level of IADL Function   Medication Management Independent   Laundry Requires Assist   Kitchen Mobility Independent   Finances Requires Assist   Home Management  Requires Assist   Shopping Requires Assist   Prior Level Of Mobility Independent With Device in Home   Driving / Transportation Relatives / Others Provide Transportation   Precautions   Precautions Fall Risk   Vitals   O2 (LPM) 2   O2 Delivery Device Silicone Nasal Cannula   Pain 0 - 10 Group   Therapist Pain Assessment Post Activity Pain Same as Prior to Activity;Nurse Notified;0   Cognition    Level of Consciousness Alert   Comments emotional/ tearful at times during eval   Passive ROM Upper Body   Passive ROM Upper Body WDL   Active ROM Upper Body   Active ROM Upper Body  X   Dominant Hand Right   Comments right wris and hand with minimal active ROM   Strength Upper Body   Upper Body Strength  X   Comments right wrist and hand 1/5, otherwise WFL   Sensation Upper Body   Upper Extremity Sensation  WDL   Upper Body Muscle Tone   Upper Body Muscle Tone  X   Comments impaired right wrist and hand, otherwise WFL   Coordination Upper Body   Coordination X   Comments impaired throughout right wrist and hand   Balance Assessment   Sitting Balance (Static) Fair +   Sitting Balance (Dynamic) Fair   Standing Balance (Static) Fair   Standing Balance (Dynamic) Fair -   Weight Shift Sitting Fair   Weight Shift Standing Poor   Comments very usteady, required tactile cues to initiate activity   Bed Mobility    Supine to Sit Supervised   Sit to Supine Minimal Assist   Scooting Supervised   Rolling Supervised   ADL Assessment   Eating Supervision   Grooming Minimal Assist;Standing   Bathing   (NT)   Upper Body Dressing Minimal Assist   Lower Body Dressing Minimal Assist   Toileting Minimal Assist   How much help from another person does the patient currently need...   Putting on and taking off regular lower body clothing? 3   Bathing (including washing, rinsing, and drying)? 3   Toileting, which includes using a toilet, bedpan, or urinal? 3   Putting on and taking off regular upper body clothing? 3   Taking care of personal  grooming such as brushing teeth? 3   Eating meals? 3   6 Clicks Daily Activity Score 18   Functional Mobility   Sit to Stand Minimal Assist   Bed, Chair, Wheelchair Transfer Minimal Assist   Toilet Transfers Moderate Assist   Transfer Method Stand Step   Visual Perception   Visual Perception  WDL   Patient / Family Goals   Patient / Family Goal #1 return home   Short Term Goals   Short Term Goal # 1 right grasp improved sufficiently to be used as a stabiliser during grooming tasks   Short Term Goal # 2 supervised level for necesary functional transfers   Short Term Goal # 3 set up for seated dressing tasks   Short Term Goal # 4 demo ability to use right hand as a stabilise during self feeding, du to improved motor control, splinting in place   Education Group   Role of Occupational Therapist Patient Response Patient;Acceptance;Explanation;Demonstration;Reinforcement Needed   Problem List   Problem List Decreased Active Daily Living Skills;Decreased Upper Extremity Strength Right;Decreased Upper Extremity AROM Right;Decreased Functional Mobility;Decreased Activity Tolerance;Safety Awareness Deficits / Cognition   Anticipated Discharge Equipment and Recommendations   DC Equipment Recommendations Unable to determine at this time   Discharge Recommendations Recommend home health for continued occupational therapy services   Interdisciplinary Plan of Care Collaboration   IDT Collaboration with  Nursing;Physical Therapist;Physician Assistant;Physician   Patient Position at End of Therapy In Bed;Bed Alarm On;Call Light within Reach;Tray Table within Reach;Phone within Reach   Collaboration Comments neurology PA in with patient at end of session   Session Information   Date / Session Number  10/25,1( 1/3,10/31)   Priority 2

## 2020-10-25 NOTE — PROGRESS NOTES
"Assumed care of pt at 0700.  Pt alert and oriented x4.  NIH 5 at start of shift for RUE/RLE drift and difference in sensation.  She is very anxious and tearful during every encounter.  Dr. Stanford talked with her this morning about possible stroke and how her UDS showed Cocaine.  Pt has been fixated on this since; stating she has \"No idea how that would happen.\"  MRI of the brain still needs to happen but we are waiting on arrival of pt's neuro stimulator remote; see previous note for details.  Bed low and locked, alarm set and call bell within reach.  Hourly rounding continues.    "

## 2020-10-25 NOTE — ASSESSMENT & PLAN NOTE
Could be related to polypharmacy      Blood and urine culture.  Negative to date  Daily labs.  Improved

## 2020-10-25 NOTE — CARE PLAN
Problem: Safety  Goal: Will remain free from injury  Outcome: PROGRESSING AS EXPECTED  Bed low and locked, alarm set, call bell within reach.  Hourly rounding continues.     Problem: Psychosocial Needs:  Goal: Level of anxiety will decrease  Outcome: PROGRESSING SLOWER THAN EXPECTED  Pt VERY anxious and tearful this morning; emotional support provided but pt fixated on the fact that her UDS came back positive for Cocaine, when she says she doesn't use cocaine.

## 2020-10-25 NOTE — DIETARY
Nutrition Services: Per admit screen, pt with wounds. Discussed with RN, RN reports no concerning wounds, only small, dark crusted divet to right thumb noted. Plan/Rec: Please consult RD if any pressure ulcers noted. RD to screen weekly for adequate intake. RD available PRN.

## 2020-10-25 NOTE — PROGRESS NOTES
[facility] TRI[facility]ICER ER REPORT    Consult/Admission requested by: Dr Brown    Chief complaint: Right arm and leg weakness  Pertinent history/ER Course: 60-year-old female with hypertension, and hyperlipidemia, who presented to the ED with right-sided numbness and weakness of both arms and legs.  She was last normal at 1:30 PM.  On exam she was unable to lift her lower extremities.  Initial blood work-up were unimpressive except for blood glucose of 184, and alk phos of 120.  CT of the head was negative for hemorrhage, and no large vessel occlusion.  Head perfusion CT was negative.  I asked the ER physician to consult neurology.  Hospital service is now being asked to admit for further work-up and management.    Patient meets admission criterion: Yes..  Recommendations given or work up & consultations requested per triage officer: As above  Consultants involved and pertinent input from consultants: Neurology    Admission status: Inpatient.   Admission order placed: Yes.   Floor requested: Neurology  Assigned hospitalist: Dr. Guido

## 2020-10-25 NOTE — CONSULTS
"Hospital Neurology Consult:    Referring Physician: Steve Brown M.D.    Reason for consultation: Possible stroke    HPI: Elina Skaggs is a 60 y.o. female with history of LICA aneurysm, anxiety, asthma, chronic back pain w/ stimulator in place, HTN, HLD, DORI, \"RA\" presenting to the hospital for stroke and consulted for stroke. Patient woke up this morning and noted weakness of her RUE. She then ate a whole box of Oreo cookies and sought help from a neighbor. Her son noted that her speech was slurry. It's difficult to obtain a history from her since she is quite tearful but she came to [facility], [place] and due to R sided weakness neurology was consulted for a possible stroke. Patient is quite somnolent, and she admits to taking some cyclobenzaprine at some point during the day.     ROS:     As above. All other systems reviewed and are negative.    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    FHx:  family history includes Cancer in her mother; Diabetes in her brother, father, and sister; Genitourinary () Problems in her father and sister; Heart Disease in her father.    SHx:   reports that she has been smoking cigarettes. She has a 3.70 pack-year smoking history. She has never used smokeless tobacco. She reports that she does not drink alcohol or use drugs.    Allergies:  Allergies   Allergen Reactions   • Ambien [Zolpidem]      Sleep walking   • Cymbalta [Duloxetine Hcl] Rash and Itching     Redness to anterior neck.       Medications:    Current Facility-Administered " Medications:   •  atorvastatin (LIPITOR) tablet 80 mg, 80 mg, Oral, Q EVENING, Maykel Guido, D.O.  •  cyclobenzaprine (Flexeril) tablet 10 mg, 10 mg, Oral, BID PRN, Maykel Guido, D.O.  •  nortriptyline (PAMELOR) capsule 10 mg, 10 mg, Oral, QHS, Maykel Guido D.O.  •  Pharmacy consult request - Allow for permissive hypertension: SBP up to 220 mmHg/DBP up to 120 mmHg x 48 hours, , Other, PHARMACY TO DOSE, Maykel Guido, D.O.  •  [START ON 10/25/2020] aspirin (ASA) tablet 325 mg, 325 mg, Oral, DAILY **OR** [START ON 10/25/2020] aspirin (ASA) chewable tab 324 mg, 324 mg, Oral, DAILY **OR** [START ON 10/25/2020] aspirin (ASA) suppository 300 mg, 300 mg, Rectal, DAILY, Maykel Dinh-Maria Victoriaanor, D.O.  •  [START ON 10/25/2020] budesonide-formoterol (SYMBICORT) 160-4.5 MCG/ACT inhaler 2 Puff, 2 Puff, Inhalation, BID, Maykel Dinh-Peter, D.O.  •  oxyCODONE immediate-release (ROXICODONE) tablet 5 mg, 5 mg, Oral, Q4HRS PRN, Maykel Guido, D.O.  •  acetaminophen (TYLENOL) tablet 650 mg, 650 mg, Oral, Q4HRS PRN, Maykel Lopezanor, D.O.  •  ondansetron (ZOFRAN) syringe/vial injection 4 mg, 4 mg, Intravenous, Q4HRS PRN, Maykel Guido, D.O.  •  [START ON 10/25/2020] enoxaparin (LOVENOX) inj 40 mg, 40 mg, Subcutaneous, DAILY, Maykel Dinh-Maria Victoriaanor, D.O.    Current Outpatient Medications:   •  cloNIDine (CATAPRES) 0.1 MG Tab, Take 0.1 mg by mouth 2 times a day as needed. SBP> 160 DBP> 90  Indications: High Blood Pressure Disorder, Disp: , Rfl:   •  nortriptyline (PAMELOR) 10 MG Cap, Take 10 mg by mouth every bedtime., Disp: , Rfl:   •  cyclobenzaprine (FLEXERIL) 10 MG Tab, Take 10 mg by mouth 2 times a day as needed for Muscle Spasms., Disp: , Rfl:   •  Fluticasone Furoate-Vilanterol (BREO ELLIPTA) 200-25 MCG/INH AEROSOL POWDER, BREATH ACTIVATED, Inhale 1 Puff by mouth every day., Disp: 1 Each, Rfl: 3  •  albuterol 108 (90 Base) MCG/ACT Aero Soln inhalation aerosol, Inhale 2 Puffs by mouth every 6  hours as needed for Shortness of Breath. (Patient not taking: Reported on 10/24/2020), Disp: 8.5 g, Rfl: 1  •  sertraline (ZOLOFT) 100 MG Tab, Take 1 Tab by mouth every evening. (Patient not taking: Reported on 10/24/2020), Disp: 30 Tab, Rfl: 1  •  atorvastatin (LIPITOR) 20 MG Tab, Take 1 Tab by mouth every evening., Disp: 90 Tab, Rfl: 1  •  enalapril (VASOTEC) 20 MG tablet, Take 1 Tab by mouth every day., Disp: 90 Tab, Rfl: 3  •  gabapentin (NEURONTIN) 800 MG tablet, Take 800 mg by mouth 3 times a day., Disp: , Rfl:   •  oxyCODONE immediate release (ROXICODONE) 10 MG immediate release tablet, Take 10 mg by mouth every four hours as needed for Moderate Pain., Disp: , Rfl:   •  SUMAtriptan (IMITREX) 50 MG Tab, Take 50 mg by mouth 1 time daily as needed for Migraine., Disp: , Rfl:     Vitals:   Vitals:    10/24/20 1900 10/24/20 1931 10/24/20 1932 10/24/20 1933   BP: 141/79 (!) 164/103     Pulse: 81 79     Resp: 14  16    Temp:    36.9 °C (98.5 °F)   TempSrc:    Temporal   SpO2:  96%     Weight:       Height:           Labs:  Lab Results   Component Value Date/Time    PROTHROMBTM 13.9 10/24/2020 05:43 PM    INR 1.04 10/24/2020 05:43 PM      Lab Results   Component Value Date/Time    WBC 4.3 (L) 10/24/2020 05:43 PM    RBC 4.04 (L) 10/24/2020 05:43 PM    HEMOGLOBIN 12.0 10/24/2020 05:43 PM    HEMATOCRIT 37.9 10/24/2020 05:43 PM    MCV 93.8 10/24/2020 05:43 PM    MCH 29.7 10/24/2020 05:43 PM    MCHC 31.7 (L) 10/24/2020 05:43 PM    MPV 9.7 10/24/2020 05:43 PM    NEUTSPOLYS 51.50 10/24/2020 05:43 PM    LYMPHOCYTES 39.40 10/24/2020 05:43 PM    MONOCYTES 5.80 10/24/2020 05:43 PM    EOSINOPHILS 1.90 10/24/2020 05:43 PM    BASOPHILS 1.20 10/24/2020 05:43 PM      Lab Results   Component Value Date/Time    SODIUM 138 10/24/2020 05:43 PM    POTASSIUM 3.6 10/24/2020 05:43 PM    CHLORIDE 100 10/24/2020 05:43 PM    CO2 28 10/24/2020 05:43 PM    GLUCOSE 184 (H) 10/24/2020 05:43 PM    BUN 10 10/24/2020 05:43 PM    CREATININE 0.74  "10/24/2020 05:43 PM    CREATININE 0.7 02/04/2007 03:50 AM      Lab Results   Component Value Date/Time    CHOLSTRLTOT 171 07/09/2019 12:33 PM     (H) 07/09/2019 12:33 PM    HDL 47 07/09/2019 12:33 PM    TRIGLYCERIDE 92 07/09/2019 12:33 PM       Lab Results   Component Value Date/Time    ALKPHOSPHAT 120 (H) 10/24/2020 05:43 PM    ASTSGOT 16 10/24/2020 05:43 PM    ALTSGPT 13 10/24/2020 05:43 PM    TBILIRUBIN 0.2 10/24/2020 05:43 PM        Lab Results   Component Value Date/Time    FREET4 0.95 06/09/2012 08:40 AM         Imaging/Testing:  CT Head W/O CST personally reviewed w/o evidence of acute ischemia/hemorrhage.     CTA Head/Neck, CTP reviewed in chart.     Physical Exam:     General: 60-year-old female in bed in no acute distress.  Cardio: Normal S1/S2. No peripheral edema.   Pulm: CTAX2. No respiratory distress.   Skin: Warm, dry, no rashes or lesions   Psychiatric: Appropriate affect. No active psychosis.  HEENT: Atraumatic head, normal sclera and conjunctiva, moist oral mucosa. No lid lag.  Abdomen: Soft, non tender. No masses or hepatosplenomegaly.    Neurologic:  Mental Status: Somnolent.  Awake and alert.  Oriented and able to follow commands.  Speech is fluent and mildly dysarthric.  Language functions appear to be intact.  Does not appear to have neglect.  Cranial Nerves:  PERRL. EOMi. Face symmetric, palate/tongue midline.   Motor: Normal muscle tone and bulk.  There is drift in the right upper extremity and right lower extremity.  Left upper and lower extremity has no drift.  Reflexes: Deferred  Coordination: Finger-to-nose with mild ataxia on the right  Sensation: Decreased to light touch on the right hemibody  Gait/Station: Deferred    Assessment/Plan:    Elina Skaggs is a 60 y.o. female with history of LICA aneurysm, anxiety, asthma, chronic back pain w/ stimulator in place, HTN, HLD, DORI, \"RA\" presenting to the hospital for stroke and consulted for stroke.  The patient's exam is suggestive " of a left hemispheric stroke, possibly lacunar as the patient's strength appears to be improving.  Other possibility is a right MCA distribution stroke.  That being said, unfortunately the patient has a implanted spinal cord stimulator and therefore it is unlikely that an MRI would be possible.  A CT within 24 hours may be able to show an abnormality however in the absence of this, given physical exam findings suspicion for stroke is high.  The patient will be admitted for evaluation of vascular risk factors and secondary stroke prevention.    Plan:   -Admit to hospital for further workup.  -Neuro checks/vital signs per protocol.  -Permissive HTN  -MRI Brain W/O CST  -Obtain CBC/CMP/TSH/LDL/Hgb A1C  -Obtain echocardiogram w/ bubble study  -Initiate smoking cessation/stroke education.  -Schedule evaluation with PT/OT/ST  -Continue ASA  -Plan discussed with consulting physician and patient's nurse.       Gene Sarabia M.D., Diplomat of the American Board of Psychiatry and Neurology  Diplomat of [employer]BP[employer] Epilepsy Subspecialty   Assistant Clinical Professor, [facility] Neurology Consultant

## 2020-10-25 NOTE — ASSESSMENT & PLAN NOTE
Patient complains of swelling of her right hand.  X-ray showed no fracture except osteoarthritis changes  Pain control

## 2020-10-25 NOTE — ED TRIAGE NOTES
Chief Complaint   Patient presents with   • Possible Stroke     Pt LKW was 0130 when she spoke with her son.  She slept until 1530, and awaoke with R arm swelling, numbness and weakness.  PT also demonstrates ataxia and B LE weakness.       Arrived at 1739, seen by ERP, NIH score assessed.  BG by EMS = 273.  Pt reports h/o L side brain aneurysm, lumbar fusion surgery with chronic pain issues.

## 2020-10-25 NOTE — ED NOTES
Med rec complete per phone call with pt home pharmacy, pt was unable to participate in interview at this time. Unable to review allergies. No antibiotic use noted in past 14 days by pharmacy.

## 2020-10-25 NOTE — PROGRESS NOTES
Pt's daughter dropped off pt's neuro stimulator remote earlier this afternoon.  Pt went down for MRI and just got back without success.  MRI technician, William, said how the remote we have is to turn on and off stimulation.  Pt's actual stimulator is dead, so would not be able to receive communication from the remote.  Pt states she hasn't charged the actual stimulator in over 6 months.  MRI department needs the actual stimulator to be charged, so it can communicate with the remote and 100% turn off the signal, for the MRI. Pt's daughter, Carline, called regarding this and said she knows the exact piece we need and she or her sister Jo will bring it this evening.    1707: Neuro APRN Daryl Mallory Texted regarding his new orders.  He DC'd the MRI brain and ordered a CT Head instead.  I updated him that the pt's daughter is bringing the missing piece we need for her neuro stimulator to be MRI compatible..wondering if he still wanted to DC the MRI.      1718: Spoke with NP; orders clarified and voice message left for pt's daughter Carline who was going to be delivering the missing piece.

## 2020-10-25 NOTE — ASSESSMENT & PLAN NOTE
CT head, CTA head and neck with no acute infarcts, hemorrhage, mass, aneurysm or LVO.    MRI brain cannot be done due to her pain stimulator  CT scan of the head was negative  Permissive hypertension protocol ordered.  Neurology following  ASA.  Lipitor  A1c/lipid profile  PT/OT recommended SNF  Fall precautions.  Neurochecks

## 2020-10-25 NOTE — ED PROVIDER NOTES
ED Provider Note    Scribed for Steve Brown M.D. by Mau Tomas. 10/24/2020  5:45 PM    Primary care provider: Darrick Smart M.D.  Means of arrival: EMS  History obtained from: EMS  History limited by: None    CHIEF COMPLAINT  Chief Complaint   Patient presents with   • Possible Stroke     Pt LKW was 0130 when she spoke with her son.  She slept until 1530, and awaoke with R arm swelling, numbness and weakness.  PT also demonstrates ataxia and B LE weakness.         PORSHA Skaggs is a 60 y.o. female who presents to the Emergency Department for evaluation of a possible stroke. Per EMS the patient was at work when she was noticed to be acting abnormally, and she started complaining of right sided numbness and swelling, however no other complaints. The patients son also reports that she has had an abnormal gait and believes that she was acting different. The patients last known normal was 1:30 AM today. On examination she is unable to lift either of her lower extremities. Otherwise she has not had any aphasia, slurred speech, or left upper extremity weakness.    REVIEW OF SYSTEMS  Pertinent positives include right arm numbness, right arm swelling, ataxia, bilateral lower extremity weakness. Pertinent negatives include no aphasia, slurred speech, left upper extremity weakness.  All other systems reviewed and negative.    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    SURGICAL HISTORY   has a past surgical history that includes tubal coagulation  laparoscopic bilateral; appendectomy; appendectomy child (1974); colonoscopy with polyp (4/1/13); cervical disk and fusion anterior (11/16/2010); cervical fusion posterior (6/19/2012); mass excision general (Left, 2017); and implant neurostim/ (N/A, 11/7/2019).    SOCIAL HISTORY  Social History     Tobacco Use   • Smoking status: Current Some Day Smoker     Packs/day: 0.10     Years: 37.00     Pack years: 3.70     Types: Cigarettes   • Smokeless tobacco: Never Used   • Tobacco comment: she has n ot smoked in 2 weeks   Substance Use Topics   • Alcohol use: No   • Drug use: No      Social History     Substance and Sexual Activity   Drug Use No       FAMILY HISTORY  Family History   Problem Relation Age of Onset   • Cancer Mother         lung cancer   • Heart Disease Father         MI   • Diabetes Father    • Genitourinary () Problems Father         renal failure on dialysis   • Diabetes Sister    • Genitourinary () Problems Sister         renal failure   • Diabetes Brother        CURRENT MEDICATIONS  Home Medications     Reviewed by Gurmeet Shah (Pharmacy Tech) on 10/24/20 at 1905  Med List Status: Complete   Medication Last Dose Status   albuterol 108 (90 Base) MCG/ACT Aero Soln inhalation aerosol Not Taking Active   atorvastatin (LIPITOR) 20 MG Tab unknown Active   cloNIDine (CATAPRES) 0.1 MG Tab unknown Active   cyclobenzaprine (FLEXERIL) 10 MG Tab unknown Active   enalapril (VASOTEC) 20 MG tablet unknown Active   Fluticasone Furoate-Vilanterol (BREO ELLIPTA) 200-25 MCG/INH AEROSOL POWDER, BREATH ACTIVATED unknown Active   gabapentin (NEURONTIN) 800 MG tablet unknown Active   nortriptyline (PAMELOR) 10 MG Cap unknown Active   oxyCODONE immediate release (ROXICODONE) 10 MG immediate release tablet unknown Active   sertraline (ZOLOFT) 100 MG Tab Not Taking Active   SUMAtriptan (IMITREX) 50 MG Tab unknown Active                ALLERGIES  Allergies   Allergen Reactions   • Ambien [Zolpidem]       "Sleep walking   • Cymbalta [Duloxetine Hcl] Rash and Itching     Redness to anterior neck.       PHYSICAL EXAM  VITAL SIGNS: /79   Pulse 81   Temp 36.9 °C (98.5 °F) (Temporal)   Resp 14   Ht 1.626 m (5' 4\")   Wt 63.5 kg (140 lb)   SpO2 91%   BMI 24.03 kg/m²     Vital signs reviewed.  Constitutional:  Altered mentation  Head: Normocephalic.   Mouth/Throat: Oropharynx is clear and moist.   Eyes: EOM are normal. Pupils are equal, round, and reactive to light.  Neck: Normal range of motion. Neck supple.  Cardiovascular: Normal rate, regular rhythm and normal heart sounds.    Pulmonary/Chest: Effort normal and breath sounds normal. No wheezes.   Abdominal: Soft. There is no tenderness. There is no rebound and no guarding.   Musculoskeletal: Exhibits no edema.   Lymphadenopathy: No cervical adenopathy.   Neurological: Patient is alert and oriented to person, place, and time. Slurred words, no facial droop, decreased strength in right upper extremity, decreased strength in bilateral lower extremities.  NIH stroke scale was 8  Skin: Skin is warm and dry.   Psychiatric: Altered mentation    LABS  Results for orders placed or performed during the hospital encounter of 10/24/20   CBC WITH DIFFERENTIAL   Result Value Ref Range    WBC 4.3 (L) 4.8 - 10.8 K/uL    RBC 4.04 (L) 4.20 - 5.40 M/uL    Hemoglobin 12.0 12.0 - 16.0 g/dL    Hematocrit 37.9 37.0 - 47.0 %    MCV 93.8 81.4 - 97.8 fL    MCH 29.7 27.0 - 33.0 pg    MCHC 31.7 (L) 33.6 - 35.0 g/dL    RDW 45.1 35.9 - 50.0 fL    Platelet Count 326 164 - 446 K/uL    MPV 9.7 9.0 - 12.9 fL    Neutrophils-Polys 51.50 44.00 - 72.00 %    Lymphocytes 39.40 22.00 - 41.00 %    Monocytes 5.80 0.00 - 13.40 %    Eosinophils 1.90 0.00 - 6.90 %    Basophils 1.20 0.00 - 1.80 %    Immature Granulocytes 0.20 0.00 - 0.90 %    Nucleated RBC 0.00 /100 WBC    Neutrophils (Absolute) 2.22 2.00 - 7.15 K/uL    Lymphs (Absolute) 1.70 1.00 - 4.80 K/uL    Monos (Absolute) 0.25 0.00 - 0.85 K/uL    " Eos (Absolute) 0.08 0.00 - 0.51 K/uL    Baso (Absolute) 0.05 0.00 - 0.12 K/uL    Immature Granulocytes (abs) 0.01 0.00 - 0.11 K/uL    NRBC (Absolute) 0.00 K/uL   COMP METABOLIC PANEL   Result Value Ref Range    Sodium 138 135 - 145 mmol/L    Potassium 3.6 3.6 - 5.5 mmol/L    Chloride 100 96 - 112 mmol/L    Co2 28 20 - 33 mmol/L    Anion Gap 10.0 7.0 - 16.0    Glucose 184 (H) 65 - 99 mg/dL    Bun 10 8 - 22 mg/dL    Creatinine 0.74 0.50 - 1.40 mg/dL    Calcium 9.0 8.5 - 10.5 mg/dL    AST(SGOT) 16 12 - 45 U/L    ALT(SGPT) 13 2 - 50 U/L    Alkaline Phosphatase 120 (H) 30 - 99 U/L    Total Bilirubin 0.2 0.1 - 1.5 mg/dL    Albumin 3.7 3.2 - 4.9 g/dL    Total Protein 6.7 6.0 - 8.2 g/dL    Globulin 3.0 1.9 - 3.5 g/dL    A-G Ratio 1.2 g/dL   PROTHROMBIN TIME   Result Value Ref Range    PT 13.9 12.0 - 14.6 sec    INR 1.04 0.87 - 1.13   APTT   Result Value Ref Range    APTT 33.2 24.7 - 36.0 sec   COD (ADULT)   Result Value Ref Range    ABO Grouping Only O     Rh Grouping Only POS     Antibody Screen-Cod NEG    TROPONIN   Result Value Ref Range    Troponin T 10 6 - 19 ng/L   ESTIMATED GFR   Result Value Ref Range    GFR If African American >60 >60 mL/min/1.73 m 2    GFR If Non African American >60 >60 mL/min/1.73 m 2   EKG (NOW)   Result Value Ref Range    Report       [facility] Emergency Dept.    Test Date:  2020-10-24  Pt Name:    CHAYA KRISHNA               Department: ER  MRN:        [id number]                      Room:  Gender:     Female                       Technician: [id number]  :        1960                   Requested By:TAWANNA KILPATRICK  Order #:    [id number]                    Reading MD:    Measurements  Intervals                                Axis  Rate:       83                           P:          73  IA:         128                          QRS:        27  QRSD:       90                           T:          8  QT:         420  QTc:        494    Interpretive Statements  SINUS  RHYTHM  CONSIDER LEFT ATRIAL ABNORMALITY  NONSPECIFIC T ABNORMALITIES, ANTERIOR LEADS  BORDERLINE PROLONGED QT INTERVAL  Compared to ECG 06/11/2019 10:15:42  T-wave abnormality now present       All labs reviewed by me.    EKG  12 Lead EKG interpreted by me to show sinus rhythm at 80. Normal P waves. Normal QRS. Normal ST segments. T wave inversions in leads v1, v2, v3. Borderline EKG with new T wave inversions.    RADIOLOGY  DX-CHEST-PORTABLE (1 VIEW)   Final Result      No acute cardiopulmonary abnormality.      CT-CTA NECK WITH & W/O-POST PROCESSING   Final Result      Bilateral carotid atherosclerosis without hemodynamically significant stenosis.      CT-CTA HEAD WITH & W/O-POST PROCESS   Final Result      No significant stenosis of the intracranial arteries.      CT-CEREBRAL PERFUSION ANALYSIS   Final Result    Limited by motion artifact.   1.  Cerebral blood flow less than 30% likely representing completed infarct = 0 mL.      2.  T Max more than 6 seconds likely representing combination of completed infarct and ischemia = 0 mL.      3.  Mismatched volume likely representing ischemic brain/penumbra = None      4.  Please note that the cerebral perfusion was performed on the limited brain tissue around the basal ganglia region. Infarct/ischemia outside the CT perfusion sections can be missed in this study.      CT-HEAD W/O   Final Result      No acute intracranial abnormality.      EC-ECHOCARDIOGRAM COMPLETE W/ CONT    (Results Pending)   MR-BRAIN-W/O    (Results Pending)   DX-HAND 3+ RIGHT    (Results Pending)     The radiologist's interpretation of all radiological studies have been reviewed by me.    COURSE & MEDICAL DECISION MAKING  Pertinent Labs & Imaging studies reviewed. (See chart for details) The patient's [facility] and past medical records were reviewed    5:45 PM - Patient seen and examined at bedside. Ordered DX-Chest 1 view, CT-Head w/o, CT-Cerebral perfusion analysis, CT-CTA head w/ and w/o,  CT-CTA neck w/ and w/o, CBC with differential, CMP, Prothrombin Time, APTT, COD Adult, Troponin, Estimated GFR to evaluate her symptoms. The differential diagnoses include but are not limited to: Small vessel occlusion versus large vessel occlusion versus hypoglycemia. Discussed with the patient that her symptoms may be secondary to a stroke and thus she is being sent to CT imaging for further evaluation.    6:30 PM - Paged Hospitalist    7:01 PM - I spoke with Dr. Villeda Hospitalist, who agrees to evaluate the patient for hospitalization.    7:35 PM - Paged Neurology.  Patient appears with had a small stroke.  Patient is not a candidate for TPA because she is over the 3-hour window.  There is no large vessel occlusion.      7:41 PM - I spoke with Dr. Sarabia, Neuro, who agrees to consult on the patient.    Medical decision making:  Imaging results showed no large vessel occlusion, patient not a candidate for TPA.    CRITICAL CARE  The very real possibilty of a deterioration of this patient's condition required the highest level of my preparedness for sudden, emergent intervention.  I provided critical care services, which included medication orders, frequent reevaluations of the patient's condition and response to treatment, ordering and reviewing test results, and discussing the case with various consultants.  The critical care time associated with the care of the patient was 35 minutes. Review chart for interventions. This time is exclusive of any other billable procedures.     DISPOSITION:  Patient will be hospitalized by Paty Woods in critical condition.    FINAL IMPRESSION  1. Acute CVA (cerebrovascular accident) (HCC)         The critical care time associated with the care of the patient was 35 minutes. Review chart for interventions. This time is exclusive of any other billable procedures.      I, Mua Tomas (Scribe), am scribing for, and in the presence of, Steve Brown,  M.D..    Electronically signed by: Mau Tomas (Scribe), 10/24/2020    I, Steve Brown M.D. personally performed the services described in this documentation, as scribed by Mau Tomas in my presence, and it is both accurate and complete. C.    The note accurately reflects work and decisions made by me.  Steve Brown M.D.  10/24/2020  11:53 PM

## 2020-10-26 ENCOUNTER — APPOINTMENT (OUTPATIENT)
Dept: RADIOLOGY | Facility: MEDICAL CENTER | Age: 60
DRG: 064 | End: 2020-10-26
Attending: NURSE PRACTITIONER
Payer: COMMERCIAL

## 2020-10-26 VITALS
SYSTOLIC BLOOD PRESSURE: 130 MMHG | OXYGEN SATURATION: 98 % | HEART RATE: 80 BPM | RESPIRATION RATE: 17 BRPM | BODY MASS INDEX: 25.27 KG/M2 | TEMPERATURE: 97.7 F | HEIGHT: 64 IN | DIASTOLIC BLOOD PRESSURE: 76 MMHG | WEIGHT: 148 LBS

## 2020-10-26 PROCEDURE — 97116 GAIT TRAINING THERAPY: CPT | Mod: CQ

## 2020-10-26 PROCEDURE — 97530 THERAPEUTIC ACTIVITIES: CPT | Mod: CQ

## 2020-10-26 PROCEDURE — 99232 SBSQ HOSP IP/OBS MODERATE 35: CPT | Performed by: NURSE PRACTITIONER

## 2020-10-26 PROCEDURE — 97112 NEUROMUSCULAR REEDUCATION: CPT

## 2020-10-26 PROCEDURE — 700102 HCHG RX REV CODE 250 W/ 637 OVERRIDE(OP): Performed by: STUDENT IN AN ORGANIZED HEALTH CARE EDUCATION/TRAINING PROGRAM

## 2020-10-26 PROCEDURE — 700111 HCHG RX REV CODE 636 W/ 250 OVERRIDE (IP): Performed by: STUDENT IN AN ORGANIZED HEALTH CARE EDUCATION/TRAINING PROGRAM

## 2020-10-26 PROCEDURE — 92610 EVALUATE SWALLOWING FUNCTION: CPT

## 2020-10-26 PROCEDURE — 97535 SELF CARE MNGMENT TRAINING: CPT

## 2020-10-26 PROCEDURE — 70450 CT HEAD/BRAIN W/O DYE: CPT

## 2020-10-26 PROCEDURE — 99239 HOSP IP/OBS DSCHRG MGMT >30: CPT | Performed by: INTERNAL MEDICINE

## 2020-10-26 PROCEDURE — A9270 NON-COVERED ITEM OR SERVICE: HCPCS | Performed by: STUDENT IN AN ORGANIZED HEALTH CARE EDUCATION/TRAINING PROGRAM

## 2020-10-26 RX ORDER — ATORVASTATIN CALCIUM 20 MG/1
40 TABLET, FILM COATED ORAL EVERY EVENING
Qty: 90 TAB | Refills: 1 | Status: SHIPPED | OUTPATIENT
Start: 2020-10-26 | End: 2021-03-30 | Stop reason: SDUPTHER

## 2020-10-26 RX ORDER — ASPIRIN 81 MG/1
81 TABLET, CHEWABLE ORAL DAILY
Qty: 100 TAB | Refills: 0 | Status: SHIPPED | OUTPATIENT
Start: 2020-10-26

## 2020-10-26 RX ADMIN — ENOXAPARIN SODIUM 40 MG: 40 INJECTION SUBCUTANEOUS at 05:34

## 2020-10-26 RX ADMIN — BUDESONIDE AND FORMOTEROL FUMARATE DIHYDRATE 2 PUFF: 160; 4.5 AEROSOL RESPIRATORY (INHALATION) at 05:35

## 2020-10-26 RX ADMIN — ACETAMINOPHEN 650 MG: 325 TABLET, FILM COATED ORAL at 13:18

## 2020-10-26 RX ADMIN — ASPIRIN 325 MG: 325 TABLET, FILM COATED ORAL at 05:35

## 2020-10-26 RX ADMIN — CYCLOBENZAPRINE 10 MG: 10 TABLET, FILM COATED ORAL at 13:18

## 2020-10-26 RX ADMIN — OXYCODONE 5 MG: 5 TABLET ORAL at 05:34

## 2020-10-26 RX ADMIN — OXYCODONE 5 MG: 5 TABLET ORAL at 11:48

## 2020-10-26 ASSESSMENT — ENCOUNTER SYMPTOMS
SHORTNESS OF BREATH: 0
EYE DISCHARGE: 0
SEIZURES: 0
EYE PAIN: 0
CHILLS: 0
BACK PAIN: 0
COUGH: 0
PALPITATIONS: 0
ORTHOPNEA: 0
DIZZINESS: 0
EYE REDNESS: 0
WEIGHT LOSS: 0
DIARRHEA: 0
STRIDOR: 0
FEVER: 0
VOMITING: 0
SPUTUM PRODUCTION: 0
HEADACHES: 0
NAUSEA: 0
MYALGIAS: 0
NECK PAIN: 0
ABDOMINAL PAIN: 0

## 2020-10-26 ASSESSMENT — COGNITIVE AND FUNCTIONAL STATUS - GENERAL
SUGGESTED CMS G CODE MODIFIER DAILY ACTIVITY: CI
MOVING TO AND FROM BED TO CHAIR: A LITTLE
WALKING IN HOSPITAL ROOM: A LITTLE
SUGGESTED CMS G CODE MODIFIER MOBILITY: CK
CLIMB 3 TO 5 STEPS WITH RAILING: A LITTLE
MOBILITY SCORE: 19
HELP NEEDED FOR BATHING: A LITTLE
STANDING UP FROM CHAIR USING ARMS: A LITTLE
DAILY ACTIVITIY SCORE: 23
MOVING FROM LYING ON BACK TO SITTING ON SIDE OF FLAT BED: A LITTLE

## 2020-10-26 ASSESSMENT — GAIT ASSESSMENTS
DEVIATION: BRADYKINETIC
GAIT LEVEL OF ASSIST: SUPERVISED

## 2020-10-26 NOTE — THERAPY
Physical Therapy   Daily Treatment     Patient Name: Elina Skaggs  Age:  60 y.o., Sex:  female  Medical Record #: [id number]  Today's Date: 10/26/2020     Precautions: (P) Swallow Precautions ( See Comments), Fall Risk    Assessment    Pt found seated on EOB upon entry into the room, just finished w/OT. Pt has improved w/all her mobility and amb tasks from previous tx session. Pt is now @ supervised level for bed mobility, functional transfers, and amb wo/AD. Pt has met all her PT goals, she is cleared for d/c home from PT standpoint w/no DME. PT will be available for any further d/c ?'s or needs.     Objective       10/26/20 1133   Balance   Sitting Balance (Static) Normal   Sitting Balance (Dynamic) Good   Standing Balance (Static) Fair +   Standing Balance (Dynamic) Fair   Weight Shift Sitting Good   Weight Shift Standing Good   Gait Analysis   Gait Level Of Assist Supervised   Assistive Device None   Distance (Feet)   (hallway distances > 500')   # of Times Distance was Traveled 1   Deviation Bradykinetic   # of Stairs Climbed 5  (w/single rail)   Level of Assist with Stairs Supervised   Skilled Intervention Verbal Cuing;Postural Facilitation;Compensatory Strategies   Comments Much improved from previous tx efforts. Pt now @ supervised level, managing hallway distances. Pt has been cleared on stairs.    Bed Mobility    Supine to Sit Supervised   Sit to Supine Supervised   Scooting Supervised   Rolling Modified Independent   Functional Mobility   Sit to Stand Supervised   Bed, Chair, Wheelchair Transfer Supervised

## 2020-10-26 NOTE — FACE TO FACE
Face to Face Supporting Documentation - Home Health    The encounter with this patient was in whole or in part the primary reason for home health admission.    Date of encounter:   Patient:                    MRN:                       YOB: 2022  Elina Skaggs  [id number]  1960     Home health to see patient for:  Skilled Nursing care for assessment, interventions & education    Skilled need for:  Comment: speech therapy dysphagia    Skilled nursing interventions to include:  Comment: speech therapy    Homebound status evidenced by:  Need the aid of supportive devices such as crutches, canes, wheelchairs or walkers. Leaving home requires a considerable and taxing effort. There is a normal inability to leave the home.    Community Physician to provide follow up care: Darrick Smart M.D.     Optional Interventions? No      I certify the face to face encounter for this home health care referral meets the CMS requirements and the encounter/clinical assessment with the patient was, in whole, or in part, for the medical condition(s) listed above, which is the primary reason for home health care. Based on my clinical findings: the service(s) are medically necessary, support the need for home health care, and the homebound criteria are met.  I certify that this patient has had a face to face encounter by myself.  Jason Stanford M.D. - NPI: [id number]

## 2020-10-26 NOTE — PROGRESS NOTES
Hospital Medicine Daily Progress Note    Date of Service  10/26/2020    Chief Complaint  60 y.o. female admitted 10/24/2020 with right upper extremity weakness    Hospital Course    60 y.o. female who presented 10/24/2020 with concerns for possible stroke.  She has a past medical history significant for hypertension, chronic back pain with spinal cord stimulator and on opiate therapy, asthma, anxiety and hyperlipidemia.  Admitted for right upper extremity weakness      Interval Problem Update  I had a very long discussion with the patient's about her medical condition.  I told her that one of her daughter call the hospital yesterday and told RN that she is worried that the patient might be using illicit drugs.  But the patient denied using any drugs at this point.  And she stated that she does not want anyone of her family to be updated with her medical condition.  She will call them personally and she will tell them what is going on.  Her CT scan of the head done earlier today was benign.  Per neurology the patient can be discharged from the standpoint.  She could not get MRI done because of her pain stimulator.  PT OT saw her and recommended SNF and will work on that.    Consultants/Specialty  Neurology    Code Status  Full Code    Disposition  Remain on the floor    Review of Systems  Review of Systems   Constitutional: Negative for chills, fever and weight loss.   HENT: Negative for congestion and nosebleeds.    Eyes: Negative for pain, discharge and redness.   Respiratory: Negative for cough, sputum production, shortness of breath and stridor.    Cardiovascular: Negative for chest pain, palpitations and orthopnea.   Gastrointestinal: Negative for abdominal pain, diarrhea, nausea and vomiting.   Genitourinary: Negative for dysuria, frequency and urgency.   Musculoskeletal: Negative for back pain, myalgias and neck pain.   Skin: Negative for itching and rash.   Neurological: Negative for dizziness, seizures and  headaches.        Physical Exam  Temp:  [36.2 °C (97.2 °F)-36.8 °C (98.2 °F)] 36.3 °C (97.4 °F)  Pulse:  [73-87] 77  Resp:  [16-17] 17  BP: (101-124)/(58-88) 120/88  SpO2:  [92 %-99 %] 97 %    Physical Exam  Vitals signs reviewed.   Constitutional:       Appearance: Normal appearance.   HENT:      Head: Normocephalic and atraumatic.      Nose: No congestion or rhinorrhea.   Eyes:      Extraocular Movements: Extraocular movements intact.      Pupils: Pupils are equal, round, and reactive to light.   Neck:      Musculoskeletal: Normal range of motion and neck supple.   Cardiovascular:      Rate and Rhythm: Normal rate and regular rhythm.      Pulses: Normal pulses.   Pulmonary:      Effort: Pulmonary effort is normal. No respiratory distress.      Breath sounds: Normal breath sounds.   Abdominal:      General: Bowel sounds are normal. There is no distension.   Musculoskeletal:         General: No swelling.   Skin:     General: Skin is warm.   Neurological:      Mental Status: She is alert.         Fluids  No intake or output data in the 24 hours ending 10/26/20 0848    Laboratory  Recent Labs     10/24/20  1743 10/25/20  0210   WBC 4.3* 5.7   RBC 4.04* 4.23   HEMOGLOBIN 12.0 12.4   HEMATOCRIT 37.9 39.7   MCV 93.8 93.9   MCH 29.7 29.3   MCHC 31.7* 31.2*   RDW 45.1 46.0   PLATELETCT 326 331   MPV 9.7 10.2     Recent Labs     10/24/20  1743 10/25/20  0210   SODIUM 138 138   POTASSIUM 3.6 3.4*   CHLORIDE 100 103   CO2 28 29   GLUCOSE 184* 114*   BUN 10 9   CREATININE 0.74 0.61   CALCIUM 9.0 8.7     Recent Labs     10/24/20  1743   APTT 33.2   INR 1.04         Recent Labs     10/25/20  0210   TRIGLYCERIDE 93   HDL 59   LDL 81       Imaging  CT-HEAD W/O   Final Result         1.  No acute intracranial abnormality.   2.  Atherosclerosis.      EC-ECHOCARDIOGRAM COMPLETE W/O CONT   Final Result      DX-HAND 3+ RIGHT   Final Result      1.  No acute findings.      2.  Mild osteoarthritis of the interphalangeal joints.       DX-CHEST-PORTABLE (1 VIEW)   Final Result      No acute cardiopulmonary abnormality.      CT-CTA NECK WITH & W/O-POST PROCESSING   Final Result      Bilateral carotid atherosclerosis without hemodynamically significant stenosis.      CT-CTA HEAD WITH & W/O-POST PROCESS   Final Result      No significant stenosis of the intracranial arteries.      CT-CEREBRAL PERFUSION ANALYSIS   Final Result    Limited by motion artifact.   1.  Cerebral blood flow less than 30% likely representing completed infarct = 0 mL.      2.  T Max more than 6 seconds likely representing combination of completed infarct and ischemia = 0 mL.      3.  Mismatched volume likely representing ischemic brain/penumbra = None      4.  Please note that the cerebral perfusion was performed on the limited brain tissue around the basal ganglia region. Infarct/ischemia outside the CT perfusion sections can be missed in this study.      CT-HEAD W/O   Final Result      No acute intracranial abnormality.           Assessment/Plan  * Possible CVA (cerebral vascular accident) (HCC)  Assessment & Plan  CT head, CTA head and neck with no acute infarcts, hemorrhage, mass, aneurysm or LVO.    MRI brain cannot be done due to her pain stimulator  CT scan of the head was negative  Permissive hypertension protocol ordered.  Neurology following  ASA.  Lipitor  A1c/lipid profile  PT/OT recommended SNF  Fall precautions.  Neurochecks    Localized swelling on right hand  Assessment & Plan  Patient complains of swelling of her right hand.  X-ray showed no fracture except osteoarthritis changes  Pain control      Chronic pain syndrome  Assessment & Plan  Patient with a history of chronic back and neck pain.    Has a spinal cord stimulation for pain control.  Takes oxycodone/gabapentin/Flexeril/nortriptyline outpatient for pain control.    Restart patient on home pain medications  Closely monitor mental status.  Avoid over sedating with opioids.     Essential  hypertension  Assessment & Plan  Closely monitor blood pressures.  Restarted Vasotec    Encephalopathy  Assessment & Plan  Could be related to polypharmacy      Blood and urine culture.  Negative to date  Daily labs.  Improved    Cigarette nicotine dependence without complication- (present on admission)  Assessment & Plan  Counseling provided.       VTE prophylaxis: lovenox

## 2020-10-26 NOTE — PROGRESS NOTES
Chief Complaint   Patient presents with   • Possible Stroke     Pt LKW was 0130 when she spoke with her son.  She slept until 1530, and awaoke with R arm swelling, numbness and weakness.  PT also demonstrates ataxia and B LE weakness.         Problem List Items Addressed This Visit     None      Visit Diagnoses     Acute CVA (cerebrovascular accident) (HCC)        Relevant Medications    atorvastatin (LIPITOR) tablet 80 mg    enoxaparin (LOVENOX) inj 40 mg    Other Relevant Orders    REFERRAL TO NEUROLOGY    Debility        Relevant Orders    REFERRAL TO SKILLED NURSING FACILITY      Neurology Progress Note     Brief History of present illness:  60-year old female with PMhx significant for anxiety/depression, dyslipidemia, hypertension, tobacco abuse, COPD (patient reports she is on home O2), chronic pain s/p spinal cord stimulator  who presented to [facility] on 10/24/20 for a chief complaint of Right sided weakness and slurred speech; also with some concern for unexplained altered mentation. CT head unremarkable on arrival here; patient not a candidate for IV tPA secondary to presentation time greater than 4.5 hours from time of symptom onset. Patient unable to undergo MRI Brain secondary to spinal cord stimulator in place. Further work up including repeat CT head is pending.     Interval, 10/26/20:   Patient sitting up at bedside, eating breakfast. Intermittently crying/tearful. Yesterday/overnight, patient had UDS, found to be positive for Cocaine, Benzodiazepines (no home Rx) and Oxy (has home Rx). Patient states that she feels somewhat better today however expresses stress regarding her family situation. Denies RUE/RLE weakness; denies headache, dizziness, numbness, problem with vision, speech or swallowing.     No changes to HPI as was previously documented.     Past medical history:   Past Medical History:   Diagnosis Date   • Aneurysm of cavernous portion of left internal carotid artery up in left  side of Head, causes HA 04/2019   • Anxiety 1993    Dr. Freeman   • Asthma     inhalers     • Chronic back pain    • Constipation 9/27/2013   • Constipation    • Cough variant asthma    • Dental disorder     dentures uppers   • Heart burn    • Hyperlipidemia    • Hypertension    • Impaired physical mobility 9/30/2013   • Lung nodule < 6cm on CT 12/2015   • MEDICAL HOME 6/2015   • Psychiatric problem     depression   • Rheumatoid arteritis (HCC)     Fingers   • Sleep apnea    • Snoring        Past surgical history:   Past Surgical History:   Procedure Laterality Date   • PB IMPLANT NEUROSTIM/ N/A 11/7/2019    Procedure: INSERTION, NEUROSTIMULATOR, PERMANENT, SPINAL CORD;  Surgeon: Checo Aldrich M.D.;  Location: SURGERY [facility];  Service: Pain Management   • MASS EXCISION GENERAL Left 2017    Left upper back, Not malignant   • COLONOSCOPY WITH POLYP  4/1/13    performed by Dr. Kelley -sigmoid colon polyp-fragments of tubular adenoma   • CERVICAL FUSION POSTERIOR  6/19/2012    Performed by PAVEL KIM at SURGERY [facility]   • CERVICAL DISK AND FUSION ANTERIOR  11/16/2010    Performed by PAVEL KIM at SURGERY [facility]   • APPENDECTOMY CHILD  1974   • APPENDECTOMY     • TUBAL COAGULATION LAPAROSCOPIC BILATERAL         Family history:   Family History   Problem Relation Age of Onset   • Cancer Mother         lung cancer   • Heart Disease Father         MI   • Diabetes Father    • Genitourinary () Problems Father         renal failure on dialysis   • Diabetes Sister    • Genitourinary () Problems Sister         renal failure   • Diabetes Brother        Social history:   Social History     Socioeconomic History   • Marital status: Legally      Spouse name: Not on file   • Number of children: Not on file   • Years of education: Not on file   • Highest education level: Not on file   Occupational History   • Occupation: [employer]     Employer: [employer]   Social  Needs   • Financial resource strain: Not on file   • Food insecurity     Worry: Not on file     Inability: Not on file   • Transportation needs     Medical: Not on file     Non-medical: Not on file   Tobacco Use   • Smoking status: Current Some Day Smoker     Packs/day: 0.10     Years: 37.00     Pack years: 3.70     Types: Cigarettes   • Smokeless tobacco: Never Used   • Tobacco comment: she has n ot smoked in 2 weeks   Substance and Sexual Activity   • Alcohol use: No   • Drug use: No   • Sexual activity: Never     Partners: Male     Birth control/protection: Surgical   Lifestyle   • Physical activity     Days per week: Not on file     Minutes per session: Not on file   • Stress: Not on file   Relationships   • Social connections     Talks on phone: Not on file     Gets together: Not on file     Attends L[employer]-day service: Not on file     Active member of club or organization: Not on file     Attends meetings of clubs or organizations: Not on file     Relationship status: Not on file   • Intimate partner violence     Fear of current or ex partner: Not on file     Emotionally abused: Not on file     Physically abused: Not on file     Forced sexual activity: Not on file   Other Topics Concern   • Not on file   Social History Narrative    ** Merged History Encounter **            Current medications:   Current Facility-Administered Medications   Medication Dose   • nortriptyline (PAMELOR) capsule 25 mg  25 mg   • LORazepam (ATIVAN) injection 1 mg  1 mg   • atorvastatin (LIPITOR) tablet 80 mg  80 mg   • cyclobenzaprine (Flexeril) tablet 10 mg  10 mg   • Pharmacy consult request - Allow for permissive hypertension: SBP up to 220 mmHg/DBP up to 120 mmHg x 48 hours     • aspirin (ASA) tablet 325 mg  325 mg    Or   • aspirin (ASA) chewable tab 324 mg  324 mg    Or   • aspirin (ASA) suppository 300 mg  300 mg   • budesonide-formoterol (SYMBICORT) 160-4.5 MCG/ACT inhaler 2 Puff  2 Puff   • oxyCODONE immediate-release  (ROXICODONE) tablet 5 mg  5 mg   • acetaminophen (TYLENOL) tablet 650 mg  650 mg   • ondansetron (ZOFRAN) syringe/vial injection 4 mg  4 mg   • enoxaparin (LOVENOX) inj 40 mg  40 mg       Medication Allergy:  Allergies   Allergen Reactions   • Ambien [Zolpidem]      Sleep walking   • Cymbalta [Duloxetine Hcl] Rash and Itching     Redness to anterior neck.       Review of systems:   Constitutional: denies fever, night sweats, weight loss.   Eyes: denies acute vision change, eye pain or secretion.   Ears, Nose, Mouth, Throat: denies nasal secretion, nasal bleeding, difficulty swallowing, hearing loss, tinnitus, vertigo, ear pain, acute dental problems, oral ulcers or lesions.   Endocrine: denies recent weight changes, heat or cold intolerance, polyuria, polydypsia, polyphagia,abnormal hair growth.  Cardiovascular: denies new onset of chest pain, palpitations, syncope, or dyspnea of exertion.  Pulmonary: denies shortness of breath, new onset of cough, hemoptysis, wheezing, chest pain or flu-like symptoms.   GI: denies nausea, vomiting, diarrhea, GI bleeding, change in appetite, abdominal pain, and change in bowel habits.  : denies dysuria, urinary incontinence, hematuria.  Heme/oncology: denies history of easy bruising or bleeding. No history of cancer, DVTor PE.  Allergy/immunology: denies hives/urticaria, or itching.   Dermatologic: denies new rash, or new skin lesions.  Musculoskeletal:denies joint swelling or pain, muscle pain, neck and back pain.   Neurologic: As noted above.   Psychiatric: denies symptoms of depression, anxiety, hallucinations, mood swings or changes, suicidal or homicidal thoughts.     Physical examination:   Vitals:    10/25/20 2000 10/26/20 0000 10/26/20 0400 10/26/20 0758   BP: 124/83 101/58 121/74 120/88   Pulse: 87 83 73 77   Resp: 16 16 16 17   Temp: 36.7 °C (98 °F) 36.4 °C (97.5 °F) 36.2 °C (97.2 °F) 36.3 °C (97.4 °F)   TempSrc: Temporal Temporal Temporal Temporal   SpO2: 95% 92% 96%  97%   Weight:       Height:         General: Patient in no acute distress, tearful intermittently.   HEENT: Normocephalic, no signs of acute trauma.   Neck: supple, no meningeal signs or carotid bruits. There is normal range of motion. No tenderness on exam.   Chest: clear to auscultation. No cough.   CV: RRR, no murmurs.   Skin: no signs of acute rashes or trauma.   Musculoskeletal: joints exhibit full range of motion, without any pain to palpation. There are no signs of joint or muscle swelling. There is no tenderness to deep palpation of muscles.   Psychiatric: No hallucinatory behavior.     NEUROLOGICAL EXAM:   Mental status, orientation: Awake, alert and fully oriented.   Speech and language: speech is clear and fluent. The patient is able to name, repeat and comprehend.   Memory: There is intact recollection of recent and remote events.   Cranial nerve exam: Pupils are 3-4 mm bilaterally and equally reactive to light. Visual fields are intact by confrontation. There is no nystagmus on primary or secondary gaze. Intact full EOM in all directions of gaze. Face appears symmetric. Sensation in the face is intact to light touch. Uvula is midline. Palate elevates symmetrically. Tongue is midline and without any signs of tongue biting or fasciculations. Sternocleidomastoid muscles exhibit is normal strength bilaterally. Shoulder shrug is intact bilaterally.   Motor exam: Strength is 5/5 in all extremities. Tone is normal. No abnormal movements were seen on exam.   Sensory exam reveals normal sense of light touch and pinprick in all extremities.   Deep tendon reflexes:  2+ throughout. Plantar responses are flexor. There is no clonus.   Coordination: shows a normal finger-nose-finger. Normal rapidly alternating movements.   Gait: The patient was able to get up from seated position on first attempt without requiring assistance. Found to be steady when walking, I witnessed patient putting on her pants, no obvious  ataxia.      NIH Stroke Scale    1a Level of Consciousness   1b Orientation Questions   1c Response to Commands   2 Gaze   3 Visual Fields   4 Facial Movement   5 Motor Function (arm)   a Left   b Right   6 Motor Function (leg)   a Left   b Right   7 Limb Ataxia   8 Sensory   9 Language   10 Articulation   11 Extinction/Inattention     Score: 0        ANCILLARY DATA REVIEWED:     Lab Data Review:  No results found for this or any previous visit (from the past 24 hour(s)).    Labs reviewed by me.       Imaging reviewed by me:     CT-HEAD W/O   Final Result         1.  No acute intracranial abnormality.   2.  Atherosclerosis.      EC-ECHOCARDIOGRAM COMPLETE W/O CONT   Final Result      DX-HAND 3+ RIGHT   Final Result      1.  No acute findings.      2.  Mild osteoarthritis of the interphalangeal joints.      DX-CHEST-PORTABLE (1 VIEW)   Final Result      No acute cardiopulmonary abnormality.      CT-CTA NECK WITH & W/O-POST PROCESSING   Final Result      Bilateral carotid atherosclerosis without hemodynamically significant stenosis.      CT-CTA HEAD WITH & W/O-POST PROCESS   Final Result      No significant stenosis of the intracranial arteries.      CT-CEREBRAL PERFUSION ANALYSIS   Final Result    Limited by motion artifact.   1.  Cerebral blood flow less than 30% likely representing completed infarct = 0 mL.      2.  T Max more than 6 seconds likely representing combination of completed infarct and ischemia = 0 mL.      3.  Mismatched volume likely representing ischemic brain/penumbra = None      4.  Please note that the cerebral perfusion was performed on the limited brain tissue around the basal ganglia region. Infarct/ischemia outside the CT perfusion sections can be missed in this study.      CT-HEAD W/O   Final Result      No acute intracranial abnormality.          Presumed mechanism by TOAST:  __Large Artery Atherosclerosis  __Small Vessel (Lacunar)  __Cardioembolic  __Other (Sickle Cell, Vasculitis,  Hypercoagulable)  _X_Unknown    Modified [place] Scale (MRS): 0 = No symptoms      ASSESSMENT AND PLAN:  60-year old female with PMhx significant for anxiety/depression, dyslipidemia, hypertension, tobacco abuse, COPD (patient reports she is on home O2), chronic pain s/p spinal cord stimulator  who [facility]24[facility]20 for a chief complaint of Right sided weakness and slurred speech; also with some concern for unexplained altered mentation; patient not a candidate for IV tPA secondary to presentation time greater than 4.5 hours from time last known well. Patient unable to undergo MRI secondary to SCS; repeat CT head this morning unremarkable; no large or territorial hypodensity suggestive of infarction; though note, given nature of CT, this does not completely rule out or exclude possibility of stroke/TIA, thus will treat patient clinically.      This morning, patient's exam appears to be improved; still with very mild ataxia/ataxic gate, however no ataxia per exam and NIHSS is 0. As was noted above, patient found to have UDS positive for cocaine-- likely playing a role in patient's hospital presentation/chief complaints.     Impression:   Possible small acute stroke vs TIA; symptoms resolved.   Anxiety, depression.   Dyslipidemia.   Hypertension.   Tobacco abuse.   Polysubstance abuse.     Recommendations/Plan:     -q4h and PRN neuro assessment. VS per nursing/unit protocol. BP goal < 140/90. Antihypertensives per primary team.   -Telemetry; currently SR. Screen for Afib/arrhythmia. Note TTE with EF 60%; no gross structural abnormalities.   -ASA 81 mg PO q day and Atorvastatin 40 mg PO q HS. Note LDL is 81, goal < 70.   -Recommend aggressive BG management per primary team. Avoid IVF with Dextrose. BG goal 140-180. hemoglobin A1c is 6.6, will defer management of ?newly discovered Diabetes to primary team.   -Continue PT/OT/SLP.   -Counseled patient at length regarding life style and risk factor  modification for secondary stroke prevention, including importance of poly substance abuse cessation.   -All other medical management per primary team.   -DVT PPX: SCDs.      The plan of care above has been discussed with Dr. Chacon. Other than the above, no further recommendations from a neurological perspective. Please call with questions.     CALE Youngblood.  [facility]

## 2020-10-26 NOTE — DISCHARGE INSTRUCTIONS
Discharge Instructions    Discharged to home by car with relative. Discharged via wheelchair, hospital escort: Yes.  Special equipment needed: Not Applicable    Be sure to schedule a follow-up appointment with your primary care doctor or any specialists as instructed.     Discharge Plan:   Diet Plan: Discussed  Activity Level: Discussed  Confirmed Follow up Appointment: Patient to Call and Schedule Appointment  Confirmed Symptoms Management: Discussed  Medication Reconciliation Updated: Yes  Influenza Vaccine Indication: Patient Refuses    I understand that a diet low in cholesterol, fat, and sodium is recommended for good health. Unless I have been given specific instructions below for another diet, I accept this instruction as my diet prescription.   Other diet: Regular    Special Instructions: None    · Is patient discharged on Warfarin / Coumadin?   No     Depression / Suicide Risk    As you are discharged from this [facility] facility, it is important to learn how to keep safe from harming yourself.    Recognize the warning signs:  · Abrupt changes in personality, positive or negative- including increase in energy   · Giving away possessions  · Change in eating patterns- significant weight changes-  positive or negative  · Change in sleeping patterns- unable to sleep or sleeping all the time   · Unwillingness or inability to communicate  · Depression  · Unusual sadness, discouragement and loneliness  · Talk of wanting to die  · Neglect of personal appearance   · Rebelliousness- reckless behavior  · Withdrawal from people/activities they love  · Confusion- inability to concentrate     If you or a loved one observes any of these behaviors or has concerns about self-harm, here's what you can do:  · Talk about it- your feelings and reasons for harming yourself  · Remove any means that you might use to hurt yourself (examples: pills, rope, extension cords, firearm)  · Get professional help from the community  "(Mental Health, Substance Abuse, psychological counseling)  · Do not be alone:Call your Safe Contact- someone whom you trust who will be there for you.  · Call your local CRISIS HOTLINE 800-8578 or 199-003-4928  · Call your local Children's Mobile Crisis Response Team [place] (565) 124-3096 or www.[employer]  · Call the toll free National Suicide Prevention Hotlines   · National Suicide Prevention Lifeline 966-517-GVFS (9083)  · [employer] Network 800-SUICIDE (628-5231)        Transient Ischemic Attack    A transient ischemic attack (TIA) is a \"warning stroke\" that causes stroke-like symptoms that go away quickly. A TIA does not cause lasting damage to the brain. But having a TIA is a sign that you may be at risk for a stroke. Lifestyle changes and medical treatments can help prevent a stroke.  It is important to know the symptoms of a TIA and what to do. Get help right away, even if your symptoms go away. The symptoms of a TIA are the same as those of a stroke. They can happen fast, and they usually go away within minutes or hours. They can include:  · Weakness or loss of feeling in your face, arm, or leg. This often happens on one side of your body.  · Trouble walking.  · Trouble moving your arms or legs.  · Trouble talking or understanding what people are saying.  · Trouble seeing.  · Seeing two of one object (double vision).  · Feeling dizzy.  · Feeling confused.  · Loss of balance or coordination.  · Feeling sick to your stomach (nauseous) and throwing up (vomiting).  · A very bad headache for no reason.  What increases the risk?  Certain things may make you more likely to have a TIA. Some of these are things that you can change, such as:  · Being very overweight (obese).  · Using products that contain nicotine or tobacco, such as cigarettes and e-cigarettes.  · Taking birth control pills.  · Not being active.  · Drinking too much alcohol.  · Using drugs.  Other risk factors " include:  · Having an irregular heartbeat (atrial fibrillation).  · Being  or .  · Having had blood clots, stroke, TIA, or heart attack in the past.  · Being a woman with a history of high blood pressure in pregnancy (preeclampsia).  · Being over the age of 60.  · Being male.  · Having family history of stroke.  · Having the following diseases or conditions:  ? High blood pressure.  ? High cholesterol.  ? Diabetes.  ? Heart disease.  ? Sickle cell disease.  ? Sleep apnea.  ? Migraine headache.  ? Long-term (chronic) diseases that cause soreness and swelling (inflammation).  ? Disorders that affect how your blood clots.  Follow these instructions at home:  Medicines    · Take over-the-counter and prescription medicines only as told by your doctor.  · If you were told to take aspirin or another medicine to thin your blood, take it exactly as told by your doctor.  ? Taking too much of the medicine can cause bleeding.  ? Taking too little of the medicine may not work to treat the problem.  Eating and drinking    · Eat 5 or more servings of fruits and vegetables each day.  · Follow instructions from your doctor about your diet. You may need to follow a certain diet to help lower your risk of having a stroke. You may need to:  ? Eat a diet that is low in fat and salt.  ? Eat foods that contain a lot of fiber.  ? Limit the amount of carbohydrates and sugar in your diet.  · Limit alcohol intake to 1 drink a day for nonpregnant women and 2 drinks a day for men. One drink equals 12 oz of beer, 5 oz of wine, or 1½ oz of hard liquor.  General instructions  · Keep a healthy weight.  · Stay active. Try to get at least 30 minutes of activity on all or most days.  · Find out if you have a condition called sleep apnea. Get treatment if needed.  · Do not use any products that contain nicotine or tobacco, such as cigarettes and e-cigarettes. If you need help quitting, ask your doctor.  · Do not abuse  "drugs.  · Keep all follow-up visits as told by your doctor. This is important.  Get help right away if:  · You have any signs of stroke. \"BE FAST\" is an easy way to remember the main warning signs:  ? B - Balance. Signs are dizziness, sudden trouble walking, or loss of balance.  ? E - Eyes. Signs are trouble seeing or a sudden change in how you see.  ? F - Face. Signs are sudden weakness or loss of feeling of the face, or the face or eyelid drooping on one side.  ? A - Arms. Signs are weakness or loss of feeling in an arm. This happens suddenly and usually on one side of the body.  ? S - Speech. Signs are sudden trouble speaking, slurred speech, or trouble understanding what people say.  ? T - Time. Time to call emergency services. Write down what time symptoms started.  · You have other signs of stroke, such as:  ? A sudden, very bad headache with no known cause.  ? Feeling sick to your stomach (nausea).  ? Throwing up (vomiting).  ? Jerky movements that you cannot control (seizure).  These symptoms may be an emergency. Do not wait to see if the symptoms will go away. Get medical help right away. Call your local emergency services (911 in the [place] Do not drive yourself to the hospital.  Summary  · A transient ischemic attack (TIA) is a \"warning stroke\" that causes stroke-like symptoms that go away quickly.  · A TIA is a medical emergency. Get help right away, even if your symptoms go away.  · A TIA does not cause lasting damage to the brain.  · Having a TIA is a sign that you may be at risk for a stroke. Lifestyle changes and medical treatments can help prevent a stroke.  This information is not intended to replace advice given to you by your health care provider. Make sure you discuss any questions you have with your health care provider.  Document Released: 09/26/2009 Document Revised: 09/13/2019 Document Reviewed: 03/21/2018  Elsevier Patient Education © 2020 [employer].      Living With Anxiety    After " being diagnosed with an anxiety disorder, you may be relieved to know why you have felt or behaved a certain way. It is natural to also feel overwhelmed about the treatment ahead and what it will mean for your life. With care and support, you can manage this condition and recover from it.  How to cope with anxiety  Dealing with stress  Stress is your body’s reaction to life changes and events, both good and bad. Stress can last just a few hours or it can be ongoing. Stress can play a major role in anxiety, so it is important to learn both how to cope with stress and how to think about it differently.  Talk with your health care provider or a counselor to learn more about stress reduction. He or she may suggest some stress reduction techniques, such as:  · Music therapy. This can include creating or listening to music that you enjoy and that inspires you.  · Mindfulness-based meditation. This involves being aware of your normal breaths, rather than trying to control your breathing. It can be done while sitting or walking.  · Centering prayer. This is a kind of meditation that involves focusing on a word, phrase, or sacred image that is meaningful to you and that brings you peace.  · Deep breathing. To do this, expand your stomach and inhale slowly through your nose. Hold your breath for 3-5 seconds. Then exhale slowly, allowing your stomach muscles to relax.  · Self-talk. This is a skill where you identify thought patterns that lead to anxiety reactions and correct those thoughts.  · Muscle relaxation. This involves tensing muscles then relaxing them.  Choose a stress reduction technique that fits your lifestyle and personality. Stress reduction techniques take time and practice. Set aside 5-15 minutes a day to do them. Therapists can offer training in these techniques. The training may be covered by some insurance plans. Other things you can do to manage stress include:  · Keeping a stress diary. This can help you  learn what triggers your stress and ways to control your response.  · Thinking about how you respond to certain situations. You may not be able to control everything, but you can control your reaction.  · Making time for activities that help you relax, and not feeling guilty about spending your time in this way.  Therapy combined with coping and stress-reduction skills provides the best chance for successful treatment.  Medicines  Medicines can help ease symptoms. Medicines for anxiety include:  · Anti-anxiety drugs.  · Antidepressants.  · Beta-blockers.  Medicines may be used as the main treatment for anxiety disorder, along with therapy, or if other treatments are not working. Medicines should be prescribed by a health care provider.  Relationships  Relationships can play a big part in helping you recover. Try to spend more time connecting with trusted friends and family members. Consider going to couples counseling, taking family education classes, or going to family therapy. Therapy can help you and others better understand the condition.  How to recognize changes in your condition  Everyone has a different response to treatment for anxiety. Recovery from anxiety happens when symptoms decrease and stop interfering with your daily activities at home or work. This may mean that you will start to:  · Have better concentration and focus.  · Sleep better.  · Be less irritable.  · Have more energy.  · Have improved memory.  It is important to recognize when your condition is getting worse. Contact your health care provider if your symptoms interfere with home or work and you do not feel like your condition is improving.  Where to find help and support:  You can get help and support from these sources:  · Self-help groups.  · Online and community organizations.  · A trusted spiritual leader.  · Couples counseling.  · Family education classes.  · Family therapy.  Follow these instructions at home:  · Eat a healthy diet  that includes plenty of vegetables, fruits, whole grains, low-fat dairy products, and lean protein. Do not eat a lot of foods that are high in solid fats, added sugars, or salt.  · Exercise. Most adults should do the following:  ? Exercise for at least 150 minutes each week. The exercise should increase your heart rate and make you sweat (moderate-intensity exercise).  ? Strengthening exercises at least twice a week.  · Cut down on caffeine, tobacco, alcohol, and other potentially harmful substances.  · Get the right amount and quality of sleep. Most adults need 7-9 hours of sleep each night.  · Make choices that simplify your life.  · Take over-the-counter and prescription medicines only as told by your health care provider.  · Avoid caffeine, alcohol, and certain over-the-counter cold medicines. These may make you feel worse. Ask your pharmacist which medicines to avoid.  · Keep all follow-up visits as told by your health care provider. This is important.  Questions to ask your health care provider  · Would I benefit from therapy?  · How often should I follow up with a health care provider?  · How long do I need to take medicine?  · Are there any long-term side effects of my medicine?  · Are there any alternatives to taking medicine?  Contact a health care provider if:  · You have a hard time staying focused or finishing daily tasks.  · You spend many hours a day feeling worried about everyday life.  · You become exhausted by worry.  · You start to have headaches, feel tense, or have nausea.  · You urinate more than normal.  · You have diarrhea.  Get help right away if:  · You have a racing heart and shortness of breath.  · You have thoughts of hurting yourself or others.  If you ever feel like you may hurt yourself or others, or have thoughts about taking your own life, get help right away. You can go to your nearest emergency department or call:  · Your local emergency services (911 in the [place]  · A suicide  crisis helpline, such as the National Suicide Prevention Lifeline at 1-462.972.1183. This is open 24-hours a day.  Summary  · Taking steps to deal with stress can help calm you.  · Medicines cannot cure anxiety disorders, but they can help ease symptoms.  · Family, friends, and partners can play a big part in helping you recover from an anxiety disorder.  This information is not intended to replace advice given to you by your health care provider. Make sure you discuss any questions you have with your health care provider.  Document Released: 12/12/2017 Document Revised: 11/30/2018 Document Reviewed: 12/12/2017  Elsevier Patient Education © 2020 Elsevier Inc.

## 2020-10-26 NOTE — PROGRESS NOTES
Pt discharged per MD orders.  IV removed.  Belongings gathered/returned to patient.  Discharge instructions, medications and follow up appointments reviewed with patient and her daughter, Carline. They deny any further questions.

## 2020-10-26 NOTE — PROGRESS NOTES
Spoke again with both of pt's daughters, Carline and Jo in regards to discharge plan.  Carline will be here to pick pt up at 1400 and would like this RN to go over the paperwork with both her and the patient then.  Jo asked this RN about Cocaine in the UDS.  The patient told me she did not want me discussing this with her kids so I asked Jo where she heard that information from.  She said the patient told her, so I did say yes, that's what the urine showed.  I educated family how Cocaine can cause TIA/stroke and they understood this and that what their mom does in her own time is her business.

## 2020-10-26 NOTE — PROGRESS NOTES
Monitor summary: SR 77-90, NE 0.16, QRS 0.10, QT 0.42, with rare PVCs, per strip from monitor room.

## 2020-10-26 NOTE — CARE PLAN
Problem: Safety  Goal: Will remain free from injury  Outcome: PROGRESSING AS EXPECTED  Bed low and locked, alarm set, call bell within reach.  Hourly rounding continues.     Problem: Psychosocial Needs:  Goal: Level of anxiety will decrease  Outcome: PROGRESSING SLOWER THAN EXPECTED  Pt HIGHLY anxious; emotional support provided.

## 2020-10-26 NOTE — PROGRESS NOTES
Assumed care of pt at 0700.  Pt alert and oriented x4, VERY anxious.  NIH this am was a 2 for minor RUE ataxia and some sensation differences amongst right and left sides.  RUE 4/5 in strength, LUE 5/5.  Pt ambulatory today with just SBA; yesterday she needed mod assist.  She denies any pain or discomfort.  Bed low and locked, alarm set and call bell within reach.  Hourly rounding continues.
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
## 2020-10-26 NOTE — DISCHARGE SUMMARY
Discharge Summary    CHIEF COMPLAINT ON ADMISSION  Chief Complaint   Patient presents with   • Possible Stroke     Pt LKW was 0130 when she spoke with her son.  She slept until 1530, and awaoke with R arm swelling, numbness and weakness.  PT also demonstrates ataxia and B LE weakness.         Reason for Admission  Possible Stroke     Admission Date  10/24/2020    CODE STATUS  Full Code    HPI & HOSPITAL COURSE     60 y.o. female who presented 10/24/2020 with concerns for possible stroke.  She has a past medical history significant for hypertension, chronic back pain with spinal cord stimulator and on opiate therapy, asthma, anxiety and hyperlipidemia.  Admitted for right upper extremity weakness. She had CT head done and showed no acute findings. MRI brain couldn't done due to her pain stimulator. Neuro was consulted and recommended to be discharge home and follow up as outpatient. PT/OT/speech evaluated the patient and home health was consulted upon discharge. I saw and examined the patient upon discharge.    Please call [phone] to schedule PCP appointment for patient.    Required specialty appointments include:         Therefore, she is discharged in fair and stable condition to home with close outpatient follow-up.    The patient met 2-midnight criteria for an inpatient stay at the time of discharge.    Discharge Date  10/26/20      FOLLOW UP ITEMS POST DISCHARGE      DISCHARGE DIAGNOSES  Principal Problem:    Possible CVA (cerebral vascular accident) (HCC) POA: Unknown  Active Problems:    Cigarette nicotine dependence without complication POA: Yes    Encephalopathy POA: Unknown    Essential hypertension POA: Unknown    Chronic pain syndrome POA: Unknown    Localized swelling on right hand POA: Unknown  Resolved Problems:    * No resolved hospital problems. *      FOLLOW UP  No future appointments.  Darrick Smart M.D.  [address] [place], [place] [address]  [place] [address]  [phone]    In 1  week        MEDICATIONS ON DISCHARGE     Medication List      START taking these medications      Instructions   aspirin 81 MG Chew chewable tablet  Commonly known as: ASA   Take 1 Tab by mouth every day.  Dose: 81 mg        CHANGE how you take these medications      Instructions   atorvastatin 20 MG Tabs  What changed: how much to take  Commonly known as: LIPITOR   Take 2 Tabs by mouth every evening.  Dose: 40 mg        CONTINUE taking these medications      Instructions   albuterol 108 (90 Base) MCG/ACT Aers inhalation aerosol   Inhale 2 Puffs by mouth every 6 hours as needed for Shortness of Breath.  Dose: 2 Puff     Breo Ellipta 200-25 MCG/INH Aepb  Generic drug: Fluticasone Furoate-Vilanterol   Inhale 1 Puff by mouth every day.  Dose: 1 Puff     cloNIDine 0.1 MG Tabs  Commonly known as: CATAPRES   Take 0.1 mg by mouth 2 times a day as needed. SBP> 160  DBP> 90  Indications: High Blood Pressure Disorder  Dose: 0.1 mg     cyclobenzaprine 10 mg Tabs  Commonly known as: Flexeril   Take 10 mg by mouth 2 times a day as needed for Muscle Spasms.  Dose: 10 mg     enalapril 20 MG tablet  Commonly known as: VASOTEC   Take 1 Tab by mouth every day.  Dose: 20 mg     gabapentin 800 MG tablet  Commonly known as: NEURONTIN   Take 800 mg by mouth 3 times a day.  Dose: 800 mg     nortriptyline 10 MG Caps  Commonly known as: PAMELOR   Take 10 mg by mouth every bedtime.  Dose: 10 mg     oxyCODONE immediate release 10 MG immediate release tablet  Commonly known as: ROXICODONE   Take 10 mg by mouth every four hours as needed for Moderate Pain.  Dose: 10 mg     sertraline 100 MG Tabs  Commonly known as: Zoloft   Take 1 Tab by mouth every evening.  Dose: 100 mg     SUMAtriptan 50 MG Tabs  Commonly known as: IMITREX   Take 50 mg by mouth 1 time daily as needed for Migraine.  Dose: 50 mg            Allergies  Allergies   Allergen Reactions   • Ambien [Zolpidem]      Sleep walking   • Cymbalta [Duloxetine Hcl] Rash and Itching      Redness to anterior neck.       DIET  Orders Placed This Encounter   Procedures   • Diet Order Cardiac     Standing Status:   Standing     Number of Occurrences:   1     Order Specific Question:   Diet:     Answer:   Cardiac [6]     Order Specific Question:   Texture Modifier     Answer:   Level 7 - Regular/Easy to Chew     Order Specific Question:   Liquid level     Answer:   Level 0 - Thin       ACTIVITY  As tolerated.  Weight bearing as tolerated    CONSULTATIONS  neuro    PROCEDURES      LABORATORY  Lab Results   Component Value Date    SODIUM 138 10/25/2020    POTASSIUM 3.4 (L) 10/25/2020    CHLORIDE 103 10/25/2020    CO2 29 10/25/2020    GLUCOSE 114 (H) 10/25/2020    BUN 9 10/25/2020    CREATININE 0.61 10/25/2020    CREATININE 0.7 02/04/2007        Lab Results   Component Value Date    WBC 5.7 10/25/2020    HEMOGLOBIN 12.4 10/25/2020    HEMATOCRIT 39.7 10/25/2020    PLATELETCT 331 10/25/2020        Total time of the discharge process exceeds 40 minutes.

## 2020-10-26 NOTE — PROGRESS NOTES
With patient’s permission, completed daily phone call to designated support person, both Carline and Jo.  Discussed patient condition and plan of care. All questions answered.

## 2020-10-26 NOTE — PROGRESS NOTES
Brief Neurology Note    The patient's chart has been reviewed. Case discussed with Dr. Castillo and bedside RN.     Patient unable to complete MRI as spinal stimulator could not communicate with remote provided from home by family.     Updated recs:   -MRI brain discontinued   -CT head wo contrast ordered and to be completed tomorrow (10/26/20)    See progress note 17:04 Addendum for further details.     Daryl Parker, MSN APRN [facility]  [facility]

## 2020-10-27 LAB
BACTERIA UR CULT: NORMAL
SIGNIFICANT IND 70042: NORMAL
SITE SITE: NORMAL
SOURCE SOURCE: NORMAL

## 2020-10-28 ENCOUNTER — OFFICE VISIT (OUTPATIENT)
Dept: MEDICAL GROUP | Facility: MEDICAL CENTER | Age: 60
End: 2020-10-28
Payer: COMMERCIAL

## 2020-10-28 VITALS
DIASTOLIC BLOOD PRESSURE: 60 MMHG | TEMPERATURE: 98.5 F | BODY MASS INDEX: 25.4 KG/M2 | SYSTOLIC BLOOD PRESSURE: 102 MMHG | RESPIRATION RATE: 16 BRPM | HEIGHT: 64 IN | OXYGEN SATURATION: 95 % | HEART RATE: 85 BPM

## 2020-10-28 DIAGNOSIS — E11.9 CONTROLLED TYPE 2 DIABETES MELLITUS WITHOUT COMPLICATION, WITHOUT LONG-TERM CURRENT USE OF INSULIN (HCC): ICD-10-CM

## 2020-10-28 DIAGNOSIS — F33.41 RECURRENT MAJOR DEPRESSIVE DISORDER, IN PARTIAL REMISSION (HCC): ICD-10-CM

## 2020-10-28 DIAGNOSIS — G89.29 CHRONIC MIDLINE LOW BACK PAIN WITHOUT SCIATICA: ICD-10-CM

## 2020-10-28 DIAGNOSIS — Z12.31 ENCOUNTER FOR SCREENING MAMMOGRAM FOR MALIGNANT NEOPLASM OF BREAST: ICD-10-CM

## 2020-10-28 DIAGNOSIS — I10 ESSENTIAL HYPERTENSION: ICD-10-CM

## 2020-10-28 DIAGNOSIS — Z09 HOSPITAL DISCHARGE FOLLOW-UP: ICD-10-CM

## 2020-10-28 DIAGNOSIS — G45.9 TIA (TRANSIENT ISCHEMIC ATTACK): ICD-10-CM

## 2020-10-28 DIAGNOSIS — M54.50 CHRONIC MIDLINE LOW BACK PAIN WITHOUT SCIATICA: ICD-10-CM

## 2020-10-28 DIAGNOSIS — Z12.11 COLON CANCER SCREENING: ICD-10-CM

## 2020-10-28 DIAGNOSIS — Z23 NEED FOR VACCINATION: ICD-10-CM

## 2020-10-28 PROCEDURE — 90662 IIV NO PRSV INCREASED AG IM: CPT | Performed by: FAMILY MEDICINE

## 2020-10-28 PROCEDURE — G0008 ADMIN INFLUENZA VIRUS VAC: HCPCS | Performed by: FAMILY MEDICINE

## 2020-10-28 PROCEDURE — 99214 OFFICE O/P EST MOD 30 MIN: CPT | Mod: 25 | Performed by: FAMILY MEDICINE

## 2020-10-28 RX ORDER — SERTRALINE HYDROCHLORIDE 100 MG/1
100 TABLET, FILM COATED ORAL EVERY EVENING
Qty: 90 TAB | Refills: 1 | Status: SHIPPED | OUTPATIENT
Start: 2020-10-28 | End: 2021-04-16 | Stop reason: SDUPTHER

## 2020-10-28 NOTE — PROGRESS NOTES
CC: Hospital discharge follow-up    HPI:   Elina presents today for posthospitalization follow-up    Patient admitted to [facility] on 10/24/2020 for possible stroke.  Patient has a past medical history significant for hypertension, chronic back pain with spinal cord stimulator and on opiate therapy, asthma, anxiety and hyperlipidemia.  Presented withright upper extremity weakness. She had CT head done and showed no acute findings. MRI brain couldn't done due to her pain stimulator. Neuro was consulted and recommended to be discharge home and follow up as outpatient. PT/OT/speech evaluated the patient and home health was consulted upon discharge.  Patient was discharged in stable condition 10/26/2020.    Came today for follow-up.  Patient seems anxious and depressed.  Lives with her son and daughter.  Came in today with her second daughter(which is the main caregiver for her now), she does not live with her.  Patient stated that she feels better on the past son and daughter works, she feels lonely,  to think about her, he drinks a lot.  Patient used to see a psychiatrist but she got fired because she kept skipping the visits.  She used to see a pain clinic that she had an abnormal urine test, so she was fired.  Came in today denies any weakness, but she is feeling depressed, denies any suicidal ideation, requested a refill of her antidepressant, and a referral to psychiatrist,pain clinic,and neurologist.        Patient Active Problem List    Diagnosis Date Noted   • Possible CVA (cerebral vascular accident) (HCC) 10/24/2020     Priority: High   • Hypoxia 07/19/2020     Priority: High   • Fever 07/19/2020     Priority: High   • Closed fracture of right fibula 06/13/2015     Priority: High   • Cervical stenosis of spinal canal 06/11/2012     Priority: High   • HTN (hypertension) 06/13/2015     Priority: Medium   • Closed fracture of lumbar vertebra (HCC) 06/13/2015     Priority: Low   • Tobacco abuse  06/13/2015     Priority: Low   • Chronic pain 06/11/2012     Priority: Low   • Encephalopathy 10/24/2020   • Essential hypertension 10/24/2020   • Chronic pain syndrome 10/24/2020   • Localized swelling on right hand 10/24/2020   • Vitamin D deficiency 06/18/2019   • Encephalopathy acute 06/11/2019   • Hypotension 06/11/2019   • Leukopenia 06/11/2019   • Aneurysm of cavernous portion of left internal carotid artery 04/01/2019   • Cigarette nicotine dependence without complication 11/06/2018   • Impaired fasting blood sugar 02/10/2015   • Dyslipidemia 02/09/2015   • Chronic low back pain 11/19/2014   • Anxiety 11/19/2014   • DDD (degenerative disc disease) 06/09/2012   • Depression 06/09/2012       Current Outpatient Medications   Medication Sig Dispense Refill   • sertraline (ZOLOFT) 100 MG Tab Take 1 Tab by mouth every evening. 90 Tab 1   • atorvastatin (LIPITOR) 20 MG Tab Take 2 Tabs by mouth every evening. 90 Tab 1   • aspirin (ASA) 81 MG Chew Tab chewable tablet Take 1 Tab by mouth every day. 100 Tab 0   • cloNIDine (CATAPRES) 0.1 MG Tab Take 0.1 mg by mouth 2 times a day as needed. SBP> 160  DBP> 90  Indications: High Blood Pressure Disorder     • nortriptyline (PAMELOR) 10 MG Cap Take 10 mg by mouth every bedtime.     • cyclobenzaprine (FLEXERIL) 10 MG Tab Take 10 mg by mouth 2 times a day as needed for Muscle Spasms.     • Fluticasone Furoate-Vilanterol (BREO ELLIPTA) 200-25 MCG/INH AEROSOL POWDER, BREATH ACTIVATED Inhale 1 Puff by mouth every day. 1 Each 3   • albuterol 108 (90 Base) MCG/ACT Aero Soln inhalation aerosol Inhale 2 Puffs by mouth every 6 hours as needed for Shortness of Breath. (Patient not taking: Reported on 10/24/2020) 8.5 g 1   • enalapril (VASOTEC) 20 MG tablet Take 1 Tab by mouth every day. 90 Tab 3   • gabapentin (NEURONTIN) 800 MG tablet Take 800 mg by mouth 3 times a day.     • oxyCODONE immediate release (ROXICODONE) 10 MG immediate release tablet Take 10 mg by mouth every four hours  "as needed for Moderate Pain.     • SUMAtriptan (IMITREX) 50 MG Tab Take 50 mg by mouth 1 time daily as needed for Migraine.       No current facility-administered medications for this visit.          Allergies as of 10/28/2020 - Reviewed 10/28/2020   Allergen Reaction Noted   • Ambien [zolpidem]  11/06/2019   • Cymbalta [duloxetine hcl] Rash and Itching 06/12/2019        ROS: Denies any chest pain, Shortness of breath, Changes bowel or bladder, Lower extremity edema.    Physical Exam:  /60 (BP Location: Right arm, Patient Position: Sitting, BP Cuff Size: Adult)   Pulse 85   Temp 36.9 °C (98.5 °F) (Temporal)   Resp 16   Ht 1.626 m (5' 4\")   SpO2 95%   BMI 25.40 kg/m²   Gen.: Well-developed, well-nourished, no apparent distress,pleasant and cooperative with the examination  Skin:  Warm and dry with good turgor. No rashes or suspicious lesions in visible areas  HEENT:Sinuses nontender with palpation, TMs clear, nares patent with pink mucosa and clear rhinorrhea,no septal deviation ,polyps or lesions. lips without lesions, oropharynx clear.  Neck: Trachea midline,no masses or adenopathy. No JVD.  Cor: Regular rate and rhythm without murmur, gallop or rub.  Lungs: Respirations unlabored.Clear to auscultation with equal breath sounds bilaterally. No wheezes, rhonchi.  Extremities: No cyanosis, clubbing or edema.        Assessment and Plan.   60 y.o. female     1. Hospital discharge follow-up  Hospital discharge summary reviewed.    2. Recurrent major depressive disorder, in partial remission (HCC)  Worsening but no suicidal ideation.  Zoloft refilled,  Patient referred to psychiatrist    - REFERRAL TO PSYCHIATRY  - sertraline (ZOLOFT) 100 MG Tab; Take 1 Tab by mouth every evening.  Dispense: 90 Tab; Refill: 1    3. Essential hypertension  Controlled.  Continue current medication    4. TIA (transient ischemic attack)  CT showed no acute problem.  Currently no residual  Continue statin aspirin    - REFERRAL TO " NEUROLOGY    5. Chronic midline low back pain without sciatica    - REFERRAL TO PAIN CLINIC    6. Controlled type 2 diabetes mellitus without complication, without long-term current use of insulin (HCC)  A1c 6.6  Patient is counseled on lifestyle modification  Continue monitoring blood glucose    7. Encounter for screening mammogram for malignant neoplasm of breast    - MA-SCREENING MAMMO BILAT W/CAD; Future    8. Colon cancer screening    - REFERRAL TO GI FOR COLONOSCOPY    9. Need for vaccination    - INFLUENZA VACCINE, HIGH DOSE (65+ ONLY)

## 2020-11-18 NOTE — DOCUMENTATION QUERY
"                                                                         [facility]                                                                       Query Response Note      PATIENT:               CHAYA KRISHNA  ACCT #:                  [id number]  MRN:                     [id number]  :                      1960  ADMIT DATE:       10/24/2020 5:55 PM  DISCH DATE:          RESPONDING  PROVIDER #:        [id number]           QUERY TEXT:    Encephalopathy is documented in the Medical Record. Please specify type.    NOTE:  If an appropriate response is not listed below, please respond with a new note.    The patient's Clinical Indicators include:  * UDS positive for Benzox, Cocaine and oxycodone    *  Exam \" suggestive of L hemispheric stroke, possibly lacunar... other possibility is R MCA distribution stroke\" is noted on Neuro exam dated 10/24    * Head perfusion CT negative.      PN 10/26   Encephalopathy  Assessment & Plan  Could be related to polypharmacy     DC Summary:  Active Problems:    Encephalopathy POA: Unknown  Options provided:   -- Due to medications or drugs   -- Hypertensive encephalopathy   -- Metabolic encephalopathy   -- Toxic encephalopathy   -- Wernicke?s encephalopathy   -- Other type of encephalopathy   -- Unable to determine      Query created by: Suzette Price on 11/3/2020 7:18 AM    RESPONSE TEXT:    Due to medications or drugs          Electronically signed by:  TAWANNA KILPATRICK MD 2020 11:42 AM              "

## 2020-11-23 ENCOUNTER — TELEPHONE (OUTPATIENT)
Dept: MEDICAL GROUP | Facility: MEDICAL CENTER | Age: 60
End: 2020-11-23

## 2020-11-23 DIAGNOSIS — G89.29 CHRONIC MIDLINE LOW BACK PAIN WITHOUT SCIATICA: ICD-10-CM

## 2020-11-23 DIAGNOSIS — M54.50 CHRONIC MIDLINE LOW BACK PAIN WITHOUT SCIATICA: ICD-10-CM

## 2020-11-23 NOTE — TELEPHONE ENCOUNTER
Patient would like to referral for pain management to go to [facility] their fax number is [phone]

## 2021-02-11 DIAGNOSIS — J45.40 MODERATE PERSISTENT ASTHMA WITHOUT COMPLICATION: ICD-10-CM

## 2021-03-15 DIAGNOSIS — Z23 NEED FOR VACCINATION: ICD-10-CM

## 2021-03-30 DIAGNOSIS — I10 ESSENTIAL HYPERTENSION: ICD-10-CM

## 2021-03-30 DIAGNOSIS — E78.5 DYSLIPIDEMIA: ICD-10-CM

## 2021-03-30 RX ORDER — ATORVASTATIN CALCIUM 20 MG/1
40 TABLET, FILM COATED ORAL EVERY EVENING
Qty: 90 TABLET | Refills: 3 | Status: SHIPPED | OUTPATIENT
Start: 2021-03-30 | End: 2021-03-31 | Stop reason: SDUPTHER

## 2021-03-30 RX ORDER — ENALAPRIL MALEATE 20 MG/1
20 TABLET ORAL
Qty: 90 TABLET | Refills: 3 | Status: SHIPPED | OUTPATIENT
Start: 2021-03-30 | End: 2021-03-31 | Stop reason: SDUPTHER

## 2021-03-31 DIAGNOSIS — I10 ESSENTIAL HYPERTENSION: ICD-10-CM

## 2021-03-31 DIAGNOSIS — E78.5 DYSLIPIDEMIA: ICD-10-CM

## 2021-03-31 RX ORDER — ATORVASTATIN CALCIUM 20 MG/1
40 TABLET, FILM COATED ORAL EVERY EVENING
Qty: 90 TABLET | Refills: 3 | Status: SHIPPED | OUTPATIENT
Start: 2021-03-31 | End: 2021-04-16 | Stop reason: SDUPTHER

## 2021-03-31 RX ORDER — ENALAPRIL MALEATE 20 MG/1
20 TABLET ORAL
Qty: 90 TABLET | Refills: 3 | Status: SHIPPED | OUTPATIENT
Start: 2021-03-31 | End: 2021-04-16 | Stop reason: SDUPTHER

## 2021-04-16 DIAGNOSIS — I10 ESSENTIAL HYPERTENSION: ICD-10-CM

## 2021-04-16 DIAGNOSIS — E78.5 DYSLIPIDEMIA: ICD-10-CM

## 2021-04-16 DIAGNOSIS — F33.41 RECURRENT MAJOR DEPRESSIVE DISORDER, IN PARTIAL REMISSION (HCC): ICD-10-CM

## 2021-04-16 DIAGNOSIS — J45.40 MODERATE PERSISTENT ASTHMA WITHOUT COMPLICATION: ICD-10-CM

## 2021-04-16 RX ORDER — ENALAPRIL MALEATE 20 MG/1
20 TABLET ORAL
Qty: 90 TABLET | Refills: 3 | Status: SHIPPED | OUTPATIENT
Start: 2021-04-16 | End: 2021-12-16 | Stop reason: SDUPTHER

## 2021-04-16 RX ORDER — ALBUTEROL SULFATE 90 UG/1
2 AEROSOL, METERED RESPIRATORY (INHALATION) EVERY 6 HOURS PRN
Qty: 8.5 G | Refills: 1 | Status: SHIPPED | OUTPATIENT
Start: 2021-04-16 | End: 2023-04-17 | Stop reason: SDUPTHER

## 2021-04-16 RX ORDER — SUMATRIPTAN 50 MG/1
50 TABLET, FILM COATED ORAL
Qty: 10 TABLET | Refills: 3 | Status: SHIPPED | OUTPATIENT
Start: 2021-04-16 | End: 2021-12-16 | Stop reason: SDUPTHER

## 2021-04-16 RX ORDER — ATORVASTATIN CALCIUM 20 MG/1
40 TABLET, FILM COATED ORAL EVERY EVENING
Qty: 90 TABLET | Refills: 3 | Status: SHIPPED | OUTPATIENT
Start: 2021-04-16 | End: 2021-04-20 | Stop reason: SDUPTHER

## 2021-04-16 RX ORDER — CLONIDINE HYDROCHLORIDE 0.1 MG/1
0.1 TABLET ORAL 2 TIMES DAILY PRN
Qty: 60 TABLET | Refills: 3 | Status: SHIPPED | OUTPATIENT
Start: 2021-04-16 | End: 2021-04-20 | Stop reason: SDUPTHER

## 2021-04-16 RX ORDER — GABAPENTIN 800 MG/1
800 TABLET ORAL 3 TIMES DAILY
Qty: 270 TABLET | Refills: 1 | Status: SHIPPED | OUTPATIENT
Start: 2021-04-16 | End: 2023-04-17 | Stop reason: SDUPTHER

## 2021-04-16 RX ORDER — CYCLOBENZAPRINE HCL 10 MG
10 TABLET ORAL 2 TIMES DAILY PRN
Qty: 20 TABLET | Refills: 0 | Status: SHIPPED | OUTPATIENT
Start: 2021-04-16 | End: 2023-04-17 | Stop reason: SDUPTHER

## 2021-04-16 RX ORDER — NORTRIPTYLINE HYDROCHLORIDE 10 MG/1
10 CAPSULE ORAL
Qty: 90 CAPSULE | Refills: 1 | Status: SHIPPED | OUTPATIENT
Start: 2021-04-16 | End: 2021-07-27

## 2021-04-16 RX ORDER — SERTRALINE HYDROCHLORIDE 100 MG/1
100 TABLET, FILM COATED ORAL EVERY EVENING
Qty: 90 TABLET | Refills: 1 | Status: SHIPPED | OUTPATIENT
Start: 2021-04-16 | End: 2021-07-27

## 2021-04-20 DIAGNOSIS — E78.5 DYSLIPIDEMIA: ICD-10-CM

## 2021-04-20 RX ORDER — CLONIDINE HYDROCHLORIDE 0.1 MG/1
0.1 TABLET ORAL 2 TIMES DAILY PRN
Qty: 60 TABLET | Refills: 1 | Status: SHIPPED | OUTPATIENT
Start: 2021-04-20 | End: 2021-07-27

## 2021-04-20 RX ORDER — ATORVASTATIN CALCIUM 20 MG/1
40 TABLET, FILM COATED ORAL EVERY EVENING
Qty: 90 TABLET | Refills: 3 | Status: SHIPPED | OUTPATIENT
Start: 2021-04-20 | End: 2021-12-16 | Stop reason: SDUPTHER

## 2021-07-27 DIAGNOSIS — F33.41 RECURRENT MAJOR DEPRESSIVE DISORDER, IN PARTIAL REMISSION (HCC): ICD-10-CM

## 2021-07-27 RX ORDER — SERTRALINE HYDROCHLORIDE 100 MG/1
TABLET, FILM COATED ORAL
Qty: 90 TABLET | Refills: 1 | Status: SHIPPED | OUTPATIENT
Start: 2021-07-27 | End: 2023-04-17 | Stop reason: SDUPTHER

## 2021-07-27 RX ORDER — CLONIDINE HYDROCHLORIDE 0.1 MG/1
0.1 TABLET ORAL 2 TIMES DAILY PRN
Qty: 180 TABLET | Refills: 1 | Status: SHIPPED | OUTPATIENT
Start: 2021-07-27 | End: 2022-06-23

## 2021-07-27 RX ORDER — NORTRIPTYLINE HYDROCHLORIDE 10 MG/1
CAPSULE ORAL
Qty: 90 CAPSULE | Refills: 1 | Status: SHIPPED | OUTPATIENT
Start: 2021-07-27 | End: 2023-04-17 | Stop reason: SDUPTHER

## 2021-09-21 ENCOUNTER — TELEPHONE (OUTPATIENT)
Dept: SCHEDULING | Facility: IMAGING CENTER | Age: 61
End: 2021-09-21

## 2021-12-07 ENCOUNTER — TELEPHONE (OUTPATIENT)
Dept: MEDICAL GROUP | Facility: MEDICAL CENTER | Age: 61
End: 2021-12-07

## 2021-12-07 NOTE — TELEPHONE ENCOUNTER
Phone Number Called: [phone]    Call outcome: Did not leave a detailed message. Requested patient to call back.    Message: Called to follow up with patient regarding the voicemail she left us requesting us to call her. LVM requesting call back.

## 2021-12-16 ENCOUNTER — OFFICE VISIT (OUTPATIENT)
Dept: MEDICAL GROUP | Facility: MEDICAL CENTER | Age: 61
End: 2021-12-16
Payer: MEDICARE

## 2021-12-16 ENCOUNTER — HOSPITAL ENCOUNTER (OUTPATIENT)
Dept: RADIOLOGY | Facility: MEDICAL CENTER | Age: 61
End: 2021-12-16
Attending: FAMILY MEDICINE
Payer: MEDICARE

## 2021-12-16 VITALS
RESPIRATION RATE: 16 BRPM | HEART RATE: 82 BPM | DIASTOLIC BLOOD PRESSURE: 80 MMHG | HEIGHT: 64 IN | OXYGEN SATURATION: 93 % | SYSTOLIC BLOOD PRESSURE: 156 MMHG | WEIGHT: 138 LBS | TEMPERATURE: 96 F | BODY MASS INDEX: 23.56 KG/M2

## 2021-12-16 DIAGNOSIS — I10 ESSENTIAL HYPERTENSION: ICD-10-CM

## 2021-12-16 DIAGNOSIS — M25.559 HIP PAIN: ICD-10-CM

## 2021-12-16 DIAGNOSIS — E11.69 HYPERLIPIDEMIA ASSOCIATED WITH TYPE 2 DIABETES MELLITUS (HCC): ICD-10-CM

## 2021-12-16 DIAGNOSIS — E78.5 DYSLIPIDEMIA: ICD-10-CM

## 2021-12-16 DIAGNOSIS — G89.4 CHRONIC PAIN SYNDROME: ICD-10-CM

## 2021-12-16 DIAGNOSIS — G89.29 CHRONIC NONINTRACTABLE HEADACHE, UNSPECIFIED HEADACHE TYPE: ICD-10-CM

## 2021-12-16 DIAGNOSIS — E78.5 HYPERLIPIDEMIA ASSOCIATED WITH TYPE 2 DIABETES MELLITUS (HCC): ICD-10-CM

## 2021-12-16 DIAGNOSIS — F32.0 CURRENT MILD EPISODE OF MAJOR DEPRESSIVE DISORDER WITHOUT PRIOR EPISODE (HCC): ICD-10-CM

## 2021-12-16 DIAGNOSIS — R51.9 CHRONIC NONINTRACTABLE HEADACHE, UNSPECIFIED HEADACHE TYPE: ICD-10-CM

## 2021-12-16 DIAGNOSIS — E11.8 DIABETES MELLITUS TYPE 2 WITH COMPLICATIONS (HCC): ICD-10-CM

## 2021-12-16 PROCEDURE — 99214 OFFICE O/P EST MOD 30 MIN: CPT | Performed by: FAMILY MEDICINE

## 2021-12-16 PROCEDURE — 73502 X-RAY EXAM HIP UNI 2-3 VIEWS: CPT | Mod: LT

## 2021-12-16 RX ORDER — CYCLOBENZAPRINE HCL 5 MG
5 TABLET ORAL 2 TIMES DAILY PRN
Qty: 20 TABLET | Refills: 0 | Status: SHIPPED | OUTPATIENT
Start: 2021-12-16 | End: 2023-04-17

## 2021-12-16 RX ORDER — ATORVASTATIN CALCIUM 20 MG/1
40 TABLET, FILM COATED ORAL EVERY EVENING
Qty: 90 TABLET | Refills: 3 | Status: SHIPPED | OUTPATIENT
Start: 2021-12-16 | End: 2021-12-20 | Stop reason: SDUPTHER

## 2021-12-16 RX ORDER — SUMATRIPTAN 50 MG/1
50 TABLET, FILM COATED ORAL
Qty: 10 TABLET | Refills: 3 | Status: SHIPPED | OUTPATIENT
Start: 2021-12-16 | End: 2023-04-17 | Stop reason: SDUPTHER

## 2021-12-16 RX ORDER — ENALAPRIL MALEATE 20 MG/1
20 TABLET ORAL
Qty: 90 TABLET | Refills: 3 | Status: SHIPPED | OUTPATIENT
Start: 2021-12-16 | End: 2023-04-17 | Stop reason: SDUPTHER

## 2021-12-16 ASSESSMENT — FIBROSIS 4 INDEX: FIB4 SCORE: 0.84

## 2021-12-16 ASSESSMENT — PATIENT HEALTH QUESTIONNAIRE - PHQ9: CLINICAL INTERPRETATION OF PHQ2 SCORE: 0

## 2021-12-16 NOTE — PROGRESS NOTES
CC: Diabetes, hypertension, hyperlipidemia, depression, chronic back pain, headache, hip pain  HPI:   Elina presents today to discuss the following medical issues:    Diabetes mellitus type 2 with complications ([facility])  Patient's last A1c of 6.6.  Patient has been noncompliant regular follow-up.  She has been on diet control.  Last A1c was done a year ago.    Essential hypertension  Blood pressure is slightly high.  However patient has not been taking her medication because she ran out.  Patient denies headache, chest pain, shortness of breath.  She has been on enalapril 20 mg daily    Hyperlipidemia associated with type 2 diabetes mellitus (HCC)  She has been tolerating the statin. Denies muscle pain LFTs has been normal, has been on atorvastatin 20 mg daily    Current mild episode of major depressive disorder without prior episode ([facility])  Mood has been fluctuating.  Sertraline has been helping her.  Denies any suicidal ideation.  She has been frustrated because of her chronic pain issue.    Chronic pain syndrome  Patient with history of chronic back pain. She used to see a pain clinic that she had an abnormal urine test, so she was fired.  Came in today denies any weakness, but she is feeling frustrated because she is not taking any pain medication. Requested a new referral for pain management.    Chronic nonintractable headache, unspecified headache type  Patient is currently asymptomatic.  However she needs a refill of Imitrex to help with her migraine headache, she has been using it as needed    Hip pain  Patient reported left hip pain, she tripped and fell about a month ago, she did not hit her head or lost.  However she has been having of consciousness is hip pain since then.  Reported a muscle spasms around the area.          Patient Active Problem List    Diagnosis Date Noted   • Encephalopathy 10/24/2020   • Possible CVA (cerebral vascular accident) (HCC) 10/24/2020   • Essential hypertension 10/24/2020   •  Chronic pain syndrome 10/24/2020   • Localized swelling on right hand 10/24/2020   • Hypoxia 07/19/2020   • Fever 07/19/2020   • Vitamin D deficiency 06/18/2019   • Encephalopathy acute 06/11/2019   • Hypotension 06/11/2019   • Leukopenia 06/11/2019   • Aneurysm of cavernous portion of left internal carotid artery 04/01/2019   • Cigarette nicotine dependence without complication 11/06/2018   • Closed fracture of right fibula 06/13/2015   • Closed fracture of lumbar vertebra (HCC) 06/13/2015   • HTN (hypertension) 06/13/2015   • Tobacco abuse 06/13/2015   • Impaired fasting blood sugar 02/10/2015   • Dyslipidemia 02/09/2015   • Chronic low back pain 11/19/2014   • Anxiety 11/19/2014   • Chronic pain 06/11/2012   • Cervical stenosis of spinal canal 06/11/2012   • DDD (degenerative disc disease) 06/09/2012   • Depression 06/09/2012       Current Outpatient Medications   Medication Sig Dispense Refill   • enalapril (VASOTEC) 20 MG tablet Take 1 Tablet by mouth every day. 90 Tablet 3   • atorvastatin (LIPITOR) 20 MG Tab Take 2 Tablets by mouth every evening. 90 Tablet 3   • SUMAtriptan (IMITREX) 50 MG Tab Take 1 Tablet by mouth 1 time a day as needed for Migraine. 10 Tablet 3   • cyclobenzaprine (FLEXERIL) 5 mg tablet Take 1 Tablet by mouth 2 times a day as needed. 20 Tablet 0   • cloNIDine (CATAPRES) 0.1 MG Tab TAKE 1 TABLET BY MOUTH 2 TIMES A DAY AS NEEDED. SBP> 160 DBP> 90 INDICATIONS: HIGH BLOOD PRESSURE DISORDER 180 tablet 1   • nortriptyline (PAMELOR) 10 MG Cap TAKE 1 CAPSULE BY MOUTH EVERYDAY AT BEDTIME 90 capsule 1   • sertraline (ZOLOFT) 100 MG Tab TAKE 1 TABLET BY MOUTH EVERY DAY IN THE EVENING 90 tablet 1   • albuterol 108 (90 Base) MCG/ACT Aero Soln inhalation aerosol Inhale 2 Puffs every 6 hours as needed for Shortness of Breath. 8.5 g 1   • Fluticasone Furoate-Vilanterol (BREO ELLIPTA) 200-25 MCG/INH AEROSOL POWDER, BREATH ACTIVATED Inhale 1 Puff every day. 1 Each 3   • cyclobenzaprine (FLEXERIL) 10 mg Tab  "Take 1 tablet by mouth 2 times a day as needed for Muscle Spasms. 20 tablet 0   • gabapentin (NEURONTIN) 800 MG tablet Take 1 tablet by mouth 3 times a day. 270 tablet 1   • aspirin (ASA) 81 MG Chew Tab chewable tablet Take 1 Tab by mouth every day. 100 Tab 0     No current facility-administered medications for this visit.         Allergies as of 12/16/2021 - Reviewed 12/16/2021   Allergen Reaction Noted   • Ambien [zolpidem]  11/06/2019   • Cymbalta [duloxetine hcl] Rash and Itching 06/12/2019        ROS: Denies any chest pain, Shortness of breath, Changes bowel or bladder, Lower extremity edema.    Physical Exam:  /80 (BP Location: Right arm, Patient Position: Sitting, BP Cuff Size: Adult)   Pulse 82   Temp (!) 35.6 °C (96 °F) (Temporal)   Resp 16   Ht 1.626 m (5' 4\")   Wt 62.6 kg (138 lb)   SpO2 93%   BMI 23.69 kg/m²   Gen.: Well-developed, well-nourished, no apparent distress,pleasant and cooperative with the examination  Skin:  Warm and dry with good turgor. No rashes or suspicious lesions in visible areas  HEENT:Sinuses nontender with palpation, TMs clear, nares patent with pink mucosa and clear rhinorrhea,no septal deviation ,polyps or lesions. lips without lesions, oropharynx clear.  Neck: Trachea midline,no masses or adenopathy. No JVD.  Cor: Regular rate and rhythm without murmur, gallop or rub.  Lungs: Respirations unlabored.Clear to auscultation with equal breath sounds bilaterally. No wheezes, rhonchi.  Extremities: No cyanosis, clubbing or edema.        Assessment and Plan.   61 y.o. female     1. Diabetes mellitus type 2 with complications (HCC)  Last A1c of 6.6.  Patient has been noncompliant regular follow-up  Has been on diet control.  We will repeat A1c.    - HEMOGLOBIN A1C; Future  - CBC WITH DIFFERENTIAL; Future  - Comp Metabolic Panel; Future  - Lipid Profile; Future  - MICROALBUMIN CREAT RATIO URINE; Future    2. Essential hypertension  Blood pressure is slightly high.  However " patient has not been taking her medication because she ran out.  Continue on enalapril 20 mg daily, refill sent to the pharmacy    - CBC WITH DIFFERENTIAL; Future  - Comp Metabolic Panel; Future  - Lipid Profile; Future  - MICROALBUMIN CREAT RATIO URINE; Future  - TSH; Future  - enalapril (VASOTEC) 20 MG tablet; Take 1 Tablet by mouth every day.  Dispense: 90 Tablet; Refill: 3    3. Hyperlipidemia associated with type 2 diabetes mellitus (HCC)  He has been tolerating the statin. Denies muscle pain LFTs has been normal  Continue on atorvastatin 20 mg daily    - Lipid Profile; Future  - TSH; Future    4. Current mild episode of major depressive disorder without prior episode (HCC)  Mood has been fluctuating.  Sertraline has been helping her    5. Chronic pain syndrome  Patient with history of chronic back pain. She used to see a pain clinic that she had an abnormal urine test, so she was fired.  Came in today denies any weakness, but she is feeling depressed, however denies any suicidal ideation.  Requested a new referral for pain management.    - Referral to Pain Management - Chronic Opioid Therapy    6. Chronic nonintractable headache, unspecified headache type  Currently asymptomatic.  However she needs a refill of Imitrex to help with her migraine headache, she has been using it as needed    - SUMAtriptan (IMITREX) 50 MG Tab; Take 1 Tablet by mouth 1 time a day as needed for Migraine.  Dispense: 10 Tablet; Refill: 3    7. Hip pain  Patient reported left hip pain, she tripped and fell about a month ago, she did not hit her head or lost.  However she has been having of consciousness is hip pain since then.  Feels muscle spasms around the area, I recommend Flexeril, medication sent to the pharmacy.    - DX-HIP-UNILATERAL-WITH PELVIS-1 VIEW LEFT; Future  - cyclobenzaprine (FLEXERIL) 5 mg tablet; Take 1 Tablet by mouth 2 times a day as needed.  Dispense: 20 Tablet; Refill: 0

## 2021-12-20 DIAGNOSIS — E78.5 DYSLIPIDEMIA: ICD-10-CM

## 2021-12-20 RX ORDER — ATORVASTATIN CALCIUM 20 MG/1
40 TABLET, FILM COATED ORAL EVERY EVENING
Qty: 90 TABLET | Refills: 3 | Status: SHIPPED | OUTPATIENT
Start: 2021-12-20 | End: 2022-06-20

## 2022-03-01 ENCOUNTER — APPOINTMENT (OUTPATIENT)
Dept: MEDICAL GROUP | Age: 62
End: 2022-03-01
Payer: MEDICARE

## 2022-06-18 DIAGNOSIS — E78.5 DYSLIPIDEMIA: ICD-10-CM

## 2022-06-20 RX ORDER — ATORVASTATIN CALCIUM 20 MG/1
TABLET, FILM COATED ORAL
Qty: 180 TABLET | Refills: 2 | Status: SHIPPED | OUTPATIENT
Start: 2022-06-20 | End: 2023-04-17 | Stop reason: SDUPTHER

## 2022-06-21 ENCOUNTER — APPOINTMENT (OUTPATIENT)
Dept: URGENT CARE | Facility: PHYSICIAN GROUP | Age: 62
End: 2022-06-21
Payer: COMMERCIAL

## 2022-06-21 ENCOUNTER — HOSPITAL ENCOUNTER (OUTPATIENT)
Dept: RADIOLOGY | Facility: MEDICAL CENTER | Age: 62
End: 2022-06-21
Attending: NURSE PRACTITIONER
Payer: COMMERCIAL

## 2022-06-21 ENCOUNTER — OFFICE VISIT (OUTPATIENT)
Dept: URGENT CARE | Facility: PHYSICIAN GROUP | Age: 62
End: 2022-06-21
Payer: COMMERCIAL

## 2022-06-21 VITALS
DIASTOLIC BLOOD PRESSURE: 78 MMHG | OXYGEN SATURATION: 91 % | SYSTOLIC BLOOD PRESSURE: 150 MMHG | TEMPERATURE: 98.9 F | HEART RATE: 80 BPM | WEIGHT: 128.2 LBS | RESPIRATION RATE: 14 BRPM | BODY MASS INDEX: 21.89 KG/M2 | HEIGHT: 64 IN

## 2022-06-21 DIAGNOSIS — G89.29 OTHER CHRONIC PAIN: ICD-10-CM

## 2022-06-21 DIAGNOSIS — M25.561 ACUTE PAIN OF RIGHT KNEE: ICD-10-CM
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
## 2022-06-21 DIAGNOSIS — M76.891 ENTHESOPATHY OF RIGHT KNEE REGION: ICD-10-CM

## 2022-06-21 DIAGNOSIS — M77.8 ENTHESOPATHY OF RIGHT ELBOW: ICD-10-CM

## 2022-06-21 DIAGNOSIS — M25.521 ELBOW PAIN, RIGHT: ICD-10-CM

## 2022-06-21 PROCEDURE — 73080 X-RAY EXAM OF ELBOW: CPT | Mod: RT

## 2022-06-21 PROCEDURE — 99214 OFFICE O/P EST MOD 30 MIN: CPT | Performed by: NURSE PRACTITIONER

## 2022-06-21 PROCEDURE — 73562 X-RAY EXAM OF KNEE 3: CPT | Mod: RT

## 2022-06-21 ASSESSMENT — FIBROSIS 4 INDEX: FIB4 SCORE: 0.86

## 2022-06-21 ASSESSMENT — ENCOUNTER SYMPTOMS
SENSORY CHANGE: 0
FALLS: 1
TINGLING: 0
COUGH: 0
LOSS OF CONSCIOUSNESS: 0
FEVER: 0
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
## 2022-06-23 RX ORDER — CLONIDINE HYDROCHLORIDE 0.1 MG/1
TABLET ORAL
Qty: 180 TABLET | Refills: 1 | Status: SHIPPED | OUTPATIENT
Start: 2022-06-23 | End: 2023-03-10

## 2022-07-06 ENCOUNTER — HOSPITAL ENCOUNTER (EMERGENCY)
Facility: MEDICAL CENTER | Age: 62
End: 2022-07-06
Attending: EMERGENCY MEDICINE
Payer: COMMERCIAL

## 2022-07-06 VITALS
SYSTOLIC BLOOD PRESSURE: 134 MMHG | HEART RATE: 76 BPM | DIASTOLIC BLOOD PRESSURE: 82 MMHG | WEIGHT: 128.53 LBS | BODY MASS INDEX: 21.94 KG/M2 | RESPIRATION RATE: 18 BRPM | OXYGEN SATURATION: 97 % | TEMPERATURE: 97.8 F | HEIGHT: 64 IN

## 2022-07-06 DIAGNOSIS — M25.561 ACUTE PAIN OF RIGHT KNEE: ICD-10-CM

## 2022-07-06 DIAGNOSIS — G56.32 LEFT RADIAL NERVE PALSY: ICD-10-CM

## 2022-07-06 PROCEDURE — 99283 EMERGENCY DEPT VISIT LOW MDM: CPT

## 2022-07-06 PROCEDURE — 29125 APPL SHORT ARM SPLINT STATIC: CPT

## 2022-07-06 PROCEDURE — 302874 HCHG BANDAGE ACE 2 OR 3""

## 2022-07-06 RX ORDER — NAPROXEN 500 MG/1
500 TABLET ORAL 2 TIMES DAILY WITH MEALS
Qty: 20 TABLET | Refills: 0 | Status: SHIPPED | OUTPATIENT
Start: 2022-07-06 | End: 2023-04-17 | Stop reason: SDUPTHER

## 2022-07-06 ASSESSMENT — FIBROSIS 4 INDEX: FIB4 SCORE: 0.86

## 2022-07-06 NOTE — ED TRIAGE NOTES
"Chief Complaint   Patient presents with   • Knee Pain     Patient reports R knee pain x2weeks, worsening the last few days. Describes burning pain   • Hand Numbness     Patient reports L hand numbness. Evaluated at ER in [place] and dx with wrist drop. Patient reports hand is still numb       63 yo female to triage for above complaint. Patient was evaluated in [place] when she was visiting there two weeks ago for same symptoms. Workup negative.    Pt is alert and oriented, speaking in full sentences, follows commands and responds appropriately to questions.     Patient placed back in lobby and educated on triage process. Asked to inform RN of any changes.    BP (!) 134/102   Pulse 75   Temp 36.4 °C (97.5 °F) (Temporal)   Resp 19   Ht 1.626 m (5' 4\")   Wt 58.3 kg (128 lb 8.5 oz)   SpO2 95%   BMI 22.06 kg/m²     "

## 2022-07-07 NOTE — ED NOTES
Reviewed discharge instructions with patient. Verbalized understanding. Patient leaving ER in stable condition.   Splint applied.

## 2022-07-07 NOTE — ED NOTES
PT back to room. PT provided warm blanket. PT resting on gurney with equal rise and fall of chest. ERP at bedside.

## 2022-07-07 NOTE — ED PROVIDER NOTES
ED Provider Note    CHIEF COMPLAINT  Chief Complaint   Patient presents with   • Knee Pain     Patient reports R knee pain x2weeks, worsening the last few days. Describes burning pain   • Hand Numbness     Patient reports L hand numbness. Evaluated at ER in [facility] and dx with wrist drop. Patient reports hand is still numb       HPI  Elina Skaggs is a 62 y.o. female who presents for evaluation of left wrist weakness and right knee pain.  This left wrist weakness is associated with decreased sensation, this [place], when she woke up she was unable to extend her left wrist.  She went to the hospital in [place] twice last week and was diagnosed with a radial nerve palsy, states she was never splinted or treated otherwise.  No other significant injuries.  The patient also reports right knee pain has been going on for quite some time, its been worse in the past couple weeks since she went to [place] and did a lot of walking.  Describes a cramping-like burning pain.  No weakness or numbness distal to that.  No acute injuries.  Had x-rays at the hospitals in [place], is requesting further testing.  She describes no other weakness numbness or tingling in the extremities other than the weakness in the left wrist.  No neck pain, back pain or headache.    REVIEW OF SYSTEMS  Negative for fever, rash, chest pain, dyspnea, abdominal pain, back pain. All other systems are negative.     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    SOCIAL  "HISTORY  Social History     Tobacco Use   • Smoking status: Current Every Day Smoker     Packs/day: 0.50     Years: 37.00     Pack years: 18.50     Types: Cigarettes   • Smokeless tobacco: Never Used   • Tobacco comment: she has n ot smoked in 2 weeks   Vaping Use   • Vaping Use: Never used   Substance and Sexual Activity   • Alcohol use: No   • Drug use: No   • Sexual activity: Never     Partners: Male     Birth control/protection: Surgical       SURGICAL HISTORY   has a past surgical history that includes tubal coagulation laparoscopic bilateral; appendectomy; appendectomy child (1974); colonoscopy with polyp (4/1/13); cervical disk and fusion anterior (11/16/2010); cervical fusion posterior (6/19/2012); mass excision general (Left, 2017); and implant neurostim/ (N/A, 11/7/2019).    CURRENT MEDICATIONS  I personally reviewed the medication list in the charting documentation.     ALLERGIES  Allergies   Allergen Reactions   • Ambien [Zolpidem]      Sleep walking   • Cymbalta [Duloxetine Hcl] Rash and Itching     Redness to anterior neck.       PHYSICAL EXAM  VITAL SIGNS: BP (!) 134/102   Pulse 75   Temp 36.4 °C (97.5 °F) (Temporal)   Resp 19   Ht 1.626 m (5' 4\")   Wt 58.3 kg (128 lb 8.5 oz)   SpO2 95%   BMI 22.06 kg/m²   Constitutional: Well appearing patient in no acute distress.  Awake and alert, not toxic nor ill in appearance.  HENT: Normocephalic, no obvious evidence of acute trauma.   Neck: Comfortable movement without any obvious restriction in the range of motion.  Eyes: Conjunctiva normal, Non-icteric.   Chest: Normal nonlabored respirations.  Skin: The exposed portions of skin reveal no obvious rash or other abnormalities.  Musculoskeletal: Inspection of the left wrist reveals a wrist drop.  Inability to extend the wrist.  Has sensation albeit she describes decreased involving the distal fingertips across the entire hand.  Has a weak .  Normal radial pulse.  No restriction in range of " motion at the elbow or shoulder.  Inspection of the right knee reveals mild effusion.  Generally tender.  Neurovascular intact distally.  Neurologic: Alert, No obvious focal deficits noted.   Psychiatric: Affect normal for clinical presentation    COURSE & MEDICAL DECISION MAKING  Pertinent Labs & Imaging studies reviewed. (See chart for details)    Encounter Summary: This is a very pleasant 62 y.o. female who unfortunately required evaluation in the emergency department today with a presentation most consistent with a left radial nerve palsy, she has been splinted and wrist extension.  Also has acutely worsening right knee pain without acute trauma.  Will refer her to orthopedics for both these.  Will prescribe naproxen.  Discharged home in stable condition      DISPOSITION: Discharge Home      FINAL IMPRESSION  1. Left radial nerve palsy    2. Acute pain of right knee        This dictation was created using voice recognition software. The accuracy of the dictation is limited to the abilities of the software. I expect there may be some errors of grammar and possibly content. The nursing notes were reviewed and certain aspects of this information were incorporated into this note.    Electronically signed by: Ilya Hernandez M.D., 7/6/2022 5:35 PM

## 2022-07-08 ENCOUNTER — OFFICE VISIT (OUTPATIENT)
Dept: PHYSICAL MEDICINE AND REHAB | Facility: MEDICAL CENTER | Age: 62
End: 2022-07-08
Payer: COMMERCIAL

## 2022-07-08 VITALS
HEIGHT: 65 IN | WEIGHT: 123.46 LBS | BODY MASS INDEX: 20.57 KG/M2 | DIASTOLIC BLOOD PRESSURE: 86 MMHG | SYSTOLIC BLOOD PRESSURE: 138 MMHG | HEART RATE: 96 BPM | OXYGEN SATURATION: 98 % | TEMPERATURE: 97.3 F

## 2022-07-08 DIAGNOSIS — Z98.1 S/P CERVICAL SPINAL FUSION: ICD-10-CM

## 2022-07-08 DIAGNOSIS — M25.561 ACUTE PAIN OF RIGHT KNEE: ICD-10-CM

## 2022-07-08 DIAGNOSIS — M54.2 CERVICALGIA: ICD-10-CM

## 2022-07-08 DIAGNOSIS — Z79.899 MEDICATION MANAGEMENT: ICD-10-CM

## 2022-07-08 DIAGNOSIS — G56.32 RADIAL NEUROPATHY, LEFT: ICD-10-CM

## 2022-07-08 DIAGNOSIS — R29.2: ICD-10-CM

## 2022-07-08 DIAGNOSIS — Z96.89 SPINAL CORD STIMULATOR STATUS: ICD-10-CM

## 2022-07-08 PROCEDURE — 99204 OFFICE O/P NEW MOD 45 MIN: CPT | Performed by: PHYSICAL MEDICINE & REHABILITATION

## 2022-07-08 ASSESSMENT — PATIENT HEALTH QUESTIONNAIRE - PHQ9
CLINICAL INTERPRETATION OF PHQ2 SCORE: 5
SUM OF ALL RESPONSES TO PHQ QUESTIONS 1-9: 17
5. POOR APPETITE OR OVEREATING: 1 - SEVERAL DAYS

## 2022-07-08 ASSESSMENT — PAIN SCALES - GENERAL: PAINLEVEL: 9=SEVERE PAIN

## 2022-07-08 ASSESSMENT — FIBROSIS 4 INDEX: FIB4 SCORE: 0.86

## 2022-07-08 NOTE — PROGRESS NOTES
New patient note    Physiatry (physical medicine and  Rehabilitation), interventional spine and sports medicine    Date of Service: 7/8/2022    Chief complaint:   Chief Complaint   Patient presents with   • New Patient     Back and neck pain       HISTORY    HPI: Elina Skaggs 62 y.o. female with history of anterior fusion at C4-7 who presents today for evaluation of neck and left upper extremity.    She reports that she started having weakness in her left arm that seemed to start about two weeks ago.  Fell asleep while holding her granddaughter woke up with numb left hand.  This was diagnosed as a Saturday night palsy.    Reports history of neck surgery anterior and posterior approach.      Dr. Whitlock placed a spinal cord stimulator for her neck.  She thinks that this is a Nevro device that was placed about 2 years ago.  She states that she does use this but not very often and wonders if it could be removed.  Additionally she reports that she had some kind of infection postoperatively, although I do not have these records or details and it sounds like she was not treated with antibiotics or not sure.  She reports that she was fired from that practice for abnormal UDS.  She then was seen by Dr. Botello for a brief period but was no longer at that practice either.     Taking naproxen.  Reports that she is taking gabapentin 800mg po tid for her from Dr. Smart.  Not taking nortriptyline 10mg.        Medical records review:  I reviewed the note from the referring provider Darrick Hart * dated 12/16/2021.  She was seen for DM, hypertension, hyperlipidemia, depression, chronic pain syndrome, intractable headache, hip pain.    Note reviewed from ED in [place] on 06/23/2022.  Reviewed report from visit to ED on 07/06/2022 and was placed in a wrist extension splint.       Previous treatments:    Physical Therapy: Not for this    Medications the patient is tried: oxycodone    Previous interventions: none  recently    Previous surgeries to relieve the above pain:  none      ROS:   Red Flags ROS:   Fever, Chills, Sweats: Denies  Involuntary Weight Loss: Denies  Bladder Incontinence: Denies  Bowel Incontinence: Denies  Saddle Anesthesia: Denies    All other systems reviewed and negative.       PMHx:   Past Medical History:   Diagnosis Date   • Aneurysm of cavernous portion of left internal carotid artery up in left side of Head, causes HA 04/2019   • Anxiety 1993    Dr. Freeman   • Asthma     inhalers     • Chronic back pain    • Constipation 9/27/2013   • Constipation    • Cough variant asthma    • Dental disorder     dentures uppers   • Heart burn    • Hyperlipidemia    • Hypertension    • Impaired physical mobility 9/30/2013   • Lung nodule < 6cm on CT 12/2015   • MEDICAL HOME 6/2015   • Psychiatric problem     depression   • Rheumatoid arteritis (HCC)     Fingers   • Sleep apnea    • Snoring        PSHx:   Past Surgical History:   Procedure Laterality Date   • PB IMPLANT NEUROSTIM/ N/A 11/7/2019    Procedure: INSERTION, NEUROSTIMULATOR, PERMANENT, SPINAL CORD;  Surgeon: Checo Aldrich M.D.;  Location: SURGERY [facility];  Service: Pain Management   • MASS EXCISION GENERAL Left 2017    Left upper back, Not malignant   • COLONOSCOPY WITH POLYP  4/1/13    performed by Dr. Kelley -sigmoid colon polyp-fragments of tubular adenoma   • CERVICAL FUSION POSTERIOR  6/19/2012    Performed by PAVEL KIM at SURGERY [facility]   • CERVICAL DISK AND FUSION ANTERIOR  11/16/2010    Performed by PAVEL KIM at SURGERY [facility]   • APPENDECTOMY CHILD  1974   • APPENDECTOMY     • TUBAL COAGULATION LAPAROSCOPIC BILATERAL         Family history   Family History   Problem Relation Age of Onset   • Cancer Mother         lung cancer   • Heart Disease Father         MI   • Diabetes Father    • Genitourinary () Problems Father         renal failure on dialysis   • Diabetes Sister    • Genitourinary ()  Problems Sister         renal failure   • Diabetes Brother          Medications:   Current Outpatient Medications   Medication   • naproxen (NAPROSYN) 500 MG Tab   • cloNIDine (CATAPRES) 0.1 MG Tab   • atorvastatin (LIPITOR) 20 MG Tab   • enalapril (VASOTEC) 20 MG tablet   • SUMAtriptan (IMITREX) 50 MG Tab   • cyclobenzaprine (FLEXERIL) 5 mg tablet   • nortriptyline (PAMELOR) 10 MG Cap   • sertraline (ZOLOFT) 100 MG Tab   • albuterol 108 (90 Base) MCG/ACT Aero Soln inhalation aerosol   • Fluticasone Furoate-Vilanterol (BREO ELLIPTA) 200-25 MCG/INH AEROSOL POWDER, BREATH ACTIVATED   • cyclobenzaprine (FLEXERIL) 10 mg Tab   • gabapentin (NEURONTIN) 800 MG tablet   • aspirin (ASA) 81 MG Chew Tab chewable tablet     No current facility-administered medications for this visit.       Allergies:   Allergies   Allergen Reactions   • Ambien [Zolpidem]      Sleep walking   • Cymbalta [Duloxetine Hcl] Rash and Itching     Redness to anterior neck.       Social Hx:   Social History     Socioeconomic History   • Marital status: Legally      Spouse name: Not on file   • Number of children: Not on file   • Years of education: Not on file   • Highest education level: Not on file   Occupational History   • Occupation: [employer]-     Employer: [employer]   Tobacco Use   • Smoking status: Current Every Day Smoker     Packs/day: 0.50     Years: 37.00     Pack years: 18.50     Types: Cigarettes   • Smokeless tobacco: Never Used   • Tobacco comment: she has n ot smoked in 2 weeks   Vaping Use   • Vaping Use: Never used   Substance and Sexual Activity   • Alcohol use: No   • Drug use: No   • Sexual activity: Never     Partners: Male     Birth control/protection: Surgical   Other Topics Concern   • Not on file   Social History Narrative    ** Merged History Encounter **          Social Determinants of Health     Financial Resource Strain: Not on file   Food Insecurity: Not on file   Transportation Needs: Not on file  "  Physical Activity: Not on file   Stress: Not on file   Social Connections: Not on file   Intimate Partner Violence: Not on file   Housing Stability: Not on file         EXAMINATION     Physical Exam:   Vitals: /86 (BP Location: Right arm, Patient Position: Sitting, BP Cuff Size: Adult long)   Pulse 96   Temp 36.3 °C (97.3 °F) (Temporal)   Ht 1.651 m (5' 5\")   Wt 56 kg (123 lb 7.3 oz)   SpO2 98%     Constitutional:   Body Habitus: Body mass index is 20.54 kg/m².  Cooperation: Fully cooperates with exam  Appearance: Well-groomed, well-nourished, not disheveled, in no acute distress    Eyes: No scleral icterus, no proptosis     ENT -no obvious auditory deficits, wearing a face mask    Skin -no rashes or lesions noted     Respiratory-  breathing comfortable on room air, no audible wheezing    Cardiovascular- capillary refills less than 2 seconds. No lower extremity edema is noted.     Psychiatric- alert and oriented ×3. Normal affect.     Gait - normal gait, no use of ambulatory device, nonantalgic.  Some difficulty with heel and toe walking    Musculoskeletal -   Cervical spine   Inspection: No deformities of the skin over the cervical spine. No rashes or lesions.    decreased A/P ROM in all directions, with pain.  Significantly reduced rotation to the left and limited flexion extension    Spurling’s sign: deferred    No signs of muscular atrophy in bilateral upper extremities     Tenderness to palpation of the cervical facets    Thoracic/Lumbar Spine/Sacral Spine/Hips   Inspection: No evidence of atrophy in bilateral lower extremities throughout     Lumbar spine Special tests  Neuro tension  Seated straight leg test negative bilaterally      Right knee  No evidence of effusion.  Tenderness to palpation medial joint line.  No medial or lateral instability.  Negative Lachman's.  Full functional range of motion of the right knee      Neuro       Key points for the international standards for neurological " classification of spinal cord injury (ISNCSCI) to light touch.     Dermatome R L   C4 2 2   C5 2 2   C6 2 2   C7 2 1   C8 2 1   T1 2 1   T2 2 2   L2 2 2   L3 2 2   L4 2 2   L5 2 2   S1 2 2   S2 2 2       Motor Exam Upper Extremities   ? Myotome R L   Shoulder flexion C5 5 5   Elbow flexion C5 5 5   Wrist extension C6 5 2   Elbow extension C7 5 4   Finger flexion C8 5 4   Finger abduction T1 5 4       Motor Exam Lower Extremities    ? Myotome R L   Hip flexion L2 5 5   Knee extension L3 5 5   Ankle dorsiflexion L4 5 5   Toe extension L5 5 5   Ankle plantarflexion S1 5 5       Medina’s sign positive bilaterally   Babinski sign negative bilaterally   Clonus of the ankle negative bilaterally     Reflexes  ?  R L   Biceps  3+ 3+   Brachioradialis  3+ 3+   Patella  3+ 3+   Achilles   2+ 2+       MEDICAL DECISION MAKING    Medical records review: see under HPI section.     DATA    Labs:   Lab Results   Component Value Date/Time    SODIUM 138 10/25/2020 02:10 AM    POTASSIUM 3.4 (L) 10/25/2020 02:10 AM    CHLORIDE 103 10/25/2020 02:10 AM    CO2 29 10/25/2020 02:10 AM    ANION 6.0 (L) 10/25/2020 02:10 AM    GLUCOSE 114 (H) 10/25/2020 02:10 AM    BUN 9 10/25/2020 02:10 AM    CREATININE 0.61 10/25/2020 02:10 AM    CREATININE 0.7 02/04/2007 03:50 AM    CALCIUM 8.7 10/25/2020 02:10 AM    ASTSGOT 16 10/24/2020 05:43 PM    ALTSGPT 13 10/24/2020 05:43 PM    TBILIRUBIN 0.2 10/24/2020 05:43 PM    ALBUMIN 3.7 10/24/2020 05:43 PM    TOTPROTEIN 6.7 10/24/2020 05:43 PM    GLOBULIN 3.0 10/24/2020 05:43 PM    AGRATIO 1.2 10/24/2020 05:43 PM       Lab Results   Component Value Date/Time    PROTHROMBTM 13.9 10/24/2020 05:43 PM    INR 1.04 10/24/2020 05:43 PM        Lab Results   Component Value Date/Time    WBC 5.7 10/25/2020 02:10 AM    RBC 4.23 10/25/2020 02:10 AM    HEMOGLOBIN 12.4 10/25/2020 02:10 AM    HEMATOCRIT 39.7 10/25/2020 02:10 AM    MCV 93.9 10/25/2020 02:10 AM    MCH 29.3 10/25/2020 02:10 AM    MCHC 31.2 (L) 10/25/2020 02:10  AM    MPV 10.2 10/25/2020 02:10 AM    NEUTSPOLYS 51.50 10/24/2020 05:43 PM    LYMPHOCYTES 39.40 10/24/2020 05:43 PM    MONOCYTES 5.80 10/24/2020 05:43 PM    EOSINOPHILS 1.90 10/24/2020 05:43 PM    BASOPHILS 1.20 10/24/2020 05:43 PM        Lab Results   Component Value Date/Time    HBA1C 6.6 (H) 10/24/2020 07:48 PM        Imaging: I personally reviewed following images, these are my reads    Xray right knee 06/21/2022  Quadriceps enthesophyte.  Healed proximal fibula fracture    IMAGING radiology reads. I reviewed the following radiology reads              Results for orders placed during the hospital encounter of 06/08/12    MR-CERVICAL SPINE-W/O    Impression  1. Previous anterior cervical fusion from C4 through C7.  2. Mild central canal stenosis from the C3-4 level through the C5-6 level.  3. Focal area of gliosis or myelomalacia in the cervical cord at the C5 level.  Further there is atrophic changes of the cervical cord from the top of T4 to the midportion of C5.  4. Mild cervical spondylotic changes from the C3-4 level through the C6-7 level.  5. Moderate right-sided neural foraminal stenosis at the C5-6 level..        Results for orders placed during the hospital encounter of 06/04/18    MR-LUMBAR SPINE-WITH & W/O    Impression  1.  Multifocal degenerative disease in the lumbar spine as above.  2.  There is moderate right L5 neural foraminal and right lateral recess stenosis.  3.  When compared with the previous MRI dated 10/13/2014, the degenerative changes at the level of L3-4 is progressed.      CT head 06/23/2022  Impression      No evidence for acute intracranial hemorrhage.         Diagnosis   Visit Diagnoses     ICD-10-CM   1. S/P cervical spinal fusion  Z98.1   2. Spinal cord stimulator status  Z96.89   3. Medina's reflex positive  R29.2   4. Medication management  Z79.899   5. Radial neuropathy, left  G56.32   6. Cervicalgia  M54.2   7. Acute pain of right knee  M25.561           ASSESSMENT:  Elina  Skaggs 62 y.o. female seen for above     Elina was seen today for new patient.    Diagnoses and all orders for this visit:    S/P cervical spinal fusion  -     DX-CERVICAL SPINE-2 OR 3 VIEWS; Future  -     Cancel: MR-CERVICAL SPINE-WITH & W/O; Future  -     MR-CERVICAL SPINE-WITH & W/O; Future  -     Patient identified as fall risk.  Appropriate orders and counseling given.  -     Pain Management Screen    Spinal cord stimulator status  -     DX-CERVICAL SPINE-2 OR 3 VIEWS; Future  -     Cancel: MR-CERVICAL SPINE-WITH & W/O; Future  -     MR-CERVICAL SPINE-WITH & W/O; Future    Medina's reflex positive  -     Cancel: MR-CERVICAL SPINE-WITH & W/O; Future  -     MR-CERVICAL SPINE-WITH & W/O; Future    Medication management  -     Basic Metabolic Panel; Future    Radial neuropathy, left  -     Referral to EMG - Physiatry (PMR)    Cervicalgia  -     Referral to EMG - Physiatry (PMR)  -     DX-CERVICAL SPINE-2 OR 3 VIEWS; Future  -     Cancel: MR-CERVICAL SPINE-WITH & W/O; Future  -     MR-CERVICAL SPINE-WITH & W/O; Future    Acute pain of right knee  -     Referral to Physical Therapy      1.  Discussed that she had acute onset of weakness that is consistent with left radial nerve palsy.  She does have some weakness in the left hand is outside of the radial nerve distribution.  She could have superimposed radial nerve palsy.  I have ordered an EMG of the left upper extremity to further assess this.  Additionally I have ordered x-rays of the cervical spine with flexion-extension views and MRI with and without contrast to further evaluate.  She does have a history of cervical spine surgery as well as spinal cord stimulator placement  2. Encouraged patient to use a cane.  3.  Plan to request records from previous pain specialists.  Advised that I will reassess after obtaining those records and UDS today.  Chronic opioid management may not be appropriate.   reviewed and most recent medication list from Dr. Botello in  February 2022.  We will consider appropriate referrals after obtaining more information.  4.  X-ray of the right knee was unremarkable.  Exam is also benign.  I have referred her to physical therapy to work on her knee pain.    Outside records requested:  The patient signed outside records request form for her outside records including outside images.      Follow-up: Return After imaging.      Thank you very much for asking me to participate in Elina Skaggs's care.  Please contact me with any questions or concerns.    Please note that this dictation was created using voice recognition software. I have made every reasonable attempt to correct obvious errors but there may be errors of grammar and content that I may have overlooked prior to finalization of this note.      Choco Tapia MD  Physical Medicine and Rehabilitation  Interventional Spine and Sports Physiatry  [facility]            Darrick Hart

## 2022-07-26 DIAGNOSIS — F41.9 ANXIETY: ICD-10-CM

## 2022-07-26 RX ORDER — DIAZEPAM 5 MG/1
TABLET ORAL
Qty: 1 TABLET | Refills: 0 | Status: SHIPPED | OUTPATIENT
Start: 2022-07-26 | End: 2022-08-25

## 2023-01-30 NOTE — H&P
Problem: Pain  Goal: #Acceptable pain level achieved/maintained at rest using NRS/Faces  Description: This goal is used for patients who can self-report.  Acceptable means the level is at or below the identified comfort/function goal.  Outcome: Outcome Met, Continue evaluating goal progress toward completion  Goal: # Acceptable pain level achieved/maintained with activity using NRS/Faces  Description: This goal is used for patients who can self-report and are not achieving acceptable pain control during activity.  Outcome: Outcome Met, Continue evaluating goal progress toward completion     Problem: At Risk for Falls  Goal: # Patient does not fall  Outcome: Outcome Met, Continue evaluating goal progress toward completion     Problem: VTE, Risk for  Goal: # No s/s of VTE  Outcome: Outcome Met, Continue evaluating goal progress toward completion  Goal: # Verbalizes understanding of VTE risk factors and prevention  Description: Document education using the patient education activity.   Outcome: Outcome Met, Continue evaluating goal progress toward completion      Hospital Medicine History & Physical Note    Date of Service  10/24/2020    Primary Care Physician  Darrick Smart M.D.    Consultants  Neurology    Code Status  Full Code    Chief Complaint  Chief Complaint   Patient presents with   • Possible Stroke     Pt LKW was 0130 when she spoke with her son.  She slept until 1530, and awaoke with R arm swelling, numbness and weakness.  PT also demonstrates ataxia and B LE weakness.         History of Presenting Illness  60 y.o. female who presented 10/24/2020 with concerns for possible stroke.  She has a past medical history significant for hypertension, chronic back pain with spinal cord stimulator and on opiate therapy, asthma, anxiety and hyperlipidemia.  Patient was very drowsy and dysarthric on my evaluation and also a very poor historian.  Per ED/EMS documentation, patient woke up this morning noted swelling and redness of the right upper extremity. The patient's son also reports she had an abnormal gait and was acting very different. The patient last known normal was 1:30 AM today.     In the emergency room, head and neck imaging revealed no acute infarct, bleeding, mass, aneurysm or LVO. Blood work was mostly unremarkable.     Neurology was consulted in emergency room. She will be admitted to the hospital for further work-up and evaluation of possible CVA.     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    Surgical History    has a past surgical history that includes tubal coagulation laparoscopic bilateral; appendectomy; appendectomy child (1974); colonoscopy with polyp (4/1/13); cervical disk and fusion anterior (11/16/2010); cervical fusion posterior (6/19/2012); mass excision general (Left, 2017); and pr implant neurostim/ (N/A, 11/7/2019).     Family History  family history includes Cancer in her mother; Diabetes in her brother, father, and sister; Genitourinary () Problems in her father and sister; Heart Disease in her father.     Social History   reports that she has been smoking cigarettes. She has a 3.70 pack-year smoking history. She has never used smokeless tobacco. She reports that she does not drink alcohol or use drugs.    Allergies  Allergies   Allergen Reactions   • Ambien [Zolpidem]      Sleep walking   • Cymbalta [Duloxetine Hcl] Rash and Itching     Redness to anterior neck.       Medications  Prior to Admission Medications   Prescriptions Last Dose Informant Patient Reported? Taking?   Fluticasone Furoate-Vilanterol (BREO ELLIPTA) 200-25 MCG/INH AEROSOL POWDER, BREATH ACTIVATED unknown at unknown Patient's Home Pharmacy No No   Sig: Inhale 1 Puff by mouth every day.   SUMAtriptan (IMITREX) 50 MG Tab unknown at unknown Patient's Home Pharmacy Yes No   Sig: Take 50 mg by mouth 1 time daily as needed for Migraine.   albuterol 108 (90 Base) MCG/ACT Aero Soln inhalation aerosol Not Taking at Unknown time Patient's Home Pharmacy No No   Sig: Inhale 2 Puffs by mouth every 6 hours as needed for Shortness of Breath.   Patient not taking: Reported on 10/24/2020   atorvastatin (LIPITOR) 20 MG Tab unknown at unknown Patient's Home Pharmacy No No   Sig: Take 1 Tab by mouth every evening.   cloNIDine (CATAPRES) 0.1 MG Tab unknown at unknown Patient's Home Pharmacy Yes No   Sig: Take 0.1 mg by mouth 2 times a day as needed. SBP> 160  DBP> 90  Indications: High Blood Pressure Disorder   cyclobenzaprine (FLEXERIL) 10 MG Tab  unknown at unknown Patient's Home Pharmacy Yes No   Sig: Take 10 mg by mouth 2 times a day as needed for Muscle Spasms.   enalapril (VASOTEC) 20 MG tablet unknown at unknown Patient's Home Pharmacy No No   Sig: Take 1 Tab by mouth every day.   gabapentin (NEURONTIN) 800 MG tablet unknown at unknown Patient's Home Pharmacy Yes No   Sig: Take 800 mg by mouth 3 times a day.   nortriptyline (PAMELOR) 10 MG Cap unknown at unknown Patient's Home Pharmacy Yes No   Sig: Take 10 mg by mouth every bedtime.   oxyCODONE immediate release (ROXICODONE) 10 MG immediate release tablet unknown at unknown Patient's Home Pharmacy Yes No   Sig: Take 10 mg by mouth every four hours as needed for Moderate Pain.   sertraline (ZOLOFT) 100 MG Tab Not Taking at Unknown time Patient's Home Pharmacy No No   Sig: Take 1 Tab by mouth every evening.   Patient not taking: Reported on 10/24/2020      Facility-Administered Medications: None       Physical Exam  Temp:  [36.9 °C (98.5 °F)] 36.9 °C (98.5 °F)  Pulse:  [79-88] 79  Resp:  [14-16] 16  BP: (136-164)/() 164/103  SpO2:  [91 %-96 %] 96 %    Physical Exam  Constitutional:       Appearance: Normal appearance. She is normal weight.      Comments: Patient very sleepy and drowsy during examination.   HENT:      Head: Normocephalic and atraumatic.   Eyes:      Conjunctiva/sclera: Conjunctivae normal.      Pupils: Pupils are equal, round, and reactive to light.   Neck:      Musculoskeletal: Normal range of motion and neck supple.   Cardiovascular:      Rate and Rhythm: Normal rate and regular rhythm.      Pulses: Normal pulses.      Heart sounds: Normal heart sounds.   Pulmonary:      Effort: Pulmonary effort is normal.      Breath sounds: Normal breath sounds.   Abdominal:      General: Bowel sounds are normal.      Palpations: Abdomen is soft.   Musculoskeletal:         General: No swelling or tenderness.      Comments: Right hand with diffuse swelling and tenderness, deep cut noted to thumb  finger.   Skin:     General: Skin is dry.   Neurological:      Mental Status: She is disoriented.      Comments: Speech is mildly dysarthric, No focal weakness appreciated, patient moving all extremities, no sensory deficits noted, no facial droop, tongue at midline.         Laboratory:  Recent Labs     10/24/20  1743   WBC 4.3*   RBC 4.04*   HEMOGLOBIN 12.0   HEMATOCRIT 37.9   MCV 93.8   MCH 29.7   MCHC 31.7*   RDW 45.1   PLATELETCT 326   MPV 9.7     Recent Labs     10/24/20  1743   SODIUM 138   POTASSIUM 3.6   CHLORIDE 100   CO2 28   GLUCOSE 184*   BUN 10   CREATININE 0.74   CALCIUM 9.0     Recent Labs     10/24/20  1743   ALTSGPT 13   ASTSGOT 16   ALKPHOSPHAT 120*   TBILIRUBIN 0.2   GLUCOSE 184*     Recent Labs     10/24/20  1743   APTT 33.2   INR 1.04     No results for input(s): NTPROBNP in the last 72 hours.      Recent Labs     10/24/20  1743   TROPONINT 10       Imaging:  DX-CHEST-PORTABLE (1 VIEW)   Final Result      No acute cardiopulmonary abnormality.      CT-CTA NECK WITH & W/O-POST PROCESSING   Final Result      Bilateral carotid atherosclerosis without hemodynamically significant stenosis.      CT-CTA HEAD WITH & W/O-POST PROCESS   Final Result      No significant stenosis of the intracranial arteries.      CT-CEREBRAL PERFUSION ANALYSIS   Final Result    Limited by motion artifact.   1.  Cerebral blood flow less than 30% likely representing completed infarct = 0 mL.      2.  T Max more than 6 seconds likely representing combination of completed infarct and ischemia = 0 mL.      3.  Mismatched volume likely representing ischemic brain/penumbra = None      4.  Please note that the cerebral perfusion was performed on the limited brain tissue around the basal ganglia region. Infarct/ischemia outside the CT perfusion sections can be missed in this study.      CT-HEAD W/O   Final Result      No acute intracranial abnormality.      EC-ECHOCARDIOGRAM COMPLETE W/ CONT    (Results Pending)   MR-BRAIN-W/O     (Results Pending)   DX-HAND 3+ RIGHT    (Results Pending)         Assessment/Plan:  I anticipate this patient is appropriate for observation status at this time.    * Possible CVA (cerebral vascular accident) (HCC)  Assessment & Plan  Presents to the Emergency Department for evaluation of a possible stroke.   Per EMS the patient was at work when she was noticed to be acting abnormally, and she started complaining of right sided numbness and swelling, however no other complaints.   The patients son also reports that she has had an abnormal gait and believes that she was acting different.   The patients last known normal was 1:30 AM today.     On examination in the ED, she is unable to lift either of her lower extremities. Otherwise she has not had any aphasia, slurred speech, or left upper extremity weakness.    CT head, CTA head and neck with no acute infarcts, hemorrhage, mass, aneurysm or LVO.    MRI brain ordered.  Permissive hypertension protocol ordered.  Neurology consulted.  ASA.  Lipitor  A1c/lipid profile  PT/OT/SLP  Fall precautions.  Neurochecks    Localized swelling on right hand  Assessment & Plan  Patient complains of swelling of her right hand.  States she slept and woke up still having pain and swelling in that extremity.   Also noted to have a deep ulceration of her thumb finger.    We will order x-rays of the right hand.  Pain control.  Closely monitor for deterioration.    Chronic pain syndrome  Assessment & Plan  Patient with a history of chronic back and neck pain.    Has a spinal cord stimulation for pain control.  Takes oxycodone/gabapentin/Flexeril/nortriptyline outpatient for pain control.    Restart patient on home pain medications  Closely monitor mental status.  Avoid over sedating with opioids.     Essential hypertension  Assessment & Plan  We will hold blood pressure in setting of acute CVA.  Reintroduce when appropriate.  Closely monitor blood pressures.    AMS (altered mental  status)  Assessment & Plan  Elina Skaggs is a 60 y.o. female who presents to the Emergency Department for evaluation of a possible stroke.   Per EMS the patient was at work when she was noticed to be acting abnormally, and she started complaining of right sided numbness and swelling, however no other complaints.   The patients son also reports that she has had an abnormal gait and believes that she was acting different.    Unclear etiology at this time.-Toxometabolic vs infectious versus acute stroke or polypharmacy.    MRI of the brain ordered.  Blood and urine culture.  Daily labs.  RPR.  HIV antibody.  Vitamin B12.    Cigarette nicotine dependence without complication- (present on admission)  Assessment & Plan  Counseling provided.

## 2023-02-13 NOTE — PROGRESS NOTES
1930 pt BP 80/45 around shift change. Pt a&ox4, asymptomatic and other VSS. Denies HA/CP/dizziness. offgoing RN paged MD.    1940 Rechecked pt BP 90/55, still asymptomatic.    1945 MD returned paged, Dr. Florentino. Orders received for 500ml LR bolus. Will cont to assess pt   Pre-procedure teaching reinforced.

## 2023-03-10 RX ORDER — CLONIDINE HYDROCHLORIDE 0.1 MG/1
TABLET ORAL
Qty: 180 TABLET | Refills: 1 | Status: SHIPPED | OUTPATIENT
Start: 2023-03-10 | End: 2023-04-17

## 2023-03-10 NOTE — TELEPHONE ENCOUNTER
Received request via: Patient    Was the patient seen in the last year in this department? No    Does the patient have an active prescription (recently filled or refills available) for medication(s) requested? No    Does the patient have [facility] and need 100 day supply (blood pressure, diabetes and cholesterol meds only)? Patient does not have SCP

## 2023-04-17 ENCOUNTER — HOSPITAL ENCOUNTER (OUTPATIENT)
Facility: MEDICAL CENTER | Age: 63
End: 2023-04-17
Attending: STUDENT IN AN ORGANIZED HEALTH CARE EDUCATION/TRAINING PROGRAM
Payer: MEDICARE

## 2023-04-17 ENCOUNTER — OFFICE VISIT (OUTPATIENT)
Dept: MEDICAL GROUP | Facility: MEDICAL CENTER | Age: 63
End: 2023-04-17
Payer: MEDICARE

## 2023-04-17 VITALS
OXYGEN SATURATION: 98 % | HEART RATE: 70 BPM | WEIGHT: 121.8 LBS | DIASTOLIC BLOOD PRESSURE: 78 MMHG | TEMPERATURE: 97.2 F | BODY MASS INDEX: 20.79 KG/M2 | SYSTOLIC BLOOD PRESSURE: 136 MMHG | HEIGHT: 64 IN

## 2023-04-17 DIAGNOSIS — E78.5 DYSLIPIDEMIA: ICD-10-CM

## 2023-04-17 DIAGNOSIS — Z12.31 ENCOUNTER FOR SCREENING MAMMOGRAM FOR BREAST CANCER: ICD-10-CM

## 2023-04-17 DIAGNOSIS — F33.41 RECURRENT MAJOR DEPRESSIVE DISORDER, IN PARTIAL REMISSION (HCC): ICD-10-CM

## 2023-04-17 DIAGNOSIS — Z11.59 ENCOUNTER FOR HEPATITIS C SCREENING TEST FOR LOW RISK PATIENT: ICD-10-CM

## 2023-04-17 DIAGNOSIS — G89.29 CHRONIC NONINTRACTABLE HEADACHE, UNSPECIFIED HEADACHE TYPE: ICD-10-CM

## 2023-04-17 DIAGNOSIS — S00.31XA ABRASION OF NOSE, INITIAL ENCOUNTER: ICD-10-CM

## 2023-04-17 DIAGNOSIS — Z23 NEED FOR VACCINATION: ICD-10-CM

## 2023-04-17 DIAGNOSIS — R51.9 CHRONIC NONINTRACTABLE HEADACHE, UNSPECIFIED HEADACHE TYPE: ICD-10-CM

## 2023-04-17 DIAGNOSIS — J45.40 MODERATE PERSISTENT ASTHMA WITHOUT COMPLICATION: ICD-10-CM

## 2023-04-17 DIAGNOSIS — M48.02 CERVICAL STENOSIS OF SPINAL CANAL: ICD-10-CM

## 2023-04-17 DIAGNOSIS — I10 ESSENTIAL HYPERTENSION: ICD-10-CM

## 2023-04-17 DIAGNOSIS — E11.9 TYPE 2 DIABETES MELLITUS WITHOUT COMPLICATION, WITHOUT LONG-TERM CURRENT USE OF INSULIN (HCC): ICD-10-CM

## 2023-04-17 DIAGNOSIS — J44.9 CHRONIC OBSTRUCTIVE PULMONARY DISEASE, UNSPECIFIED COPD TYPE (HCC): ICD-10-CM

## 2023-04-17 PROBLEM — I95.9 HYPOTENSION: Status: RESOLVED | Noted: 2019-06-11 | Resolved: 2023-04-17

## 2023-04-17 PROBLEM — R50.9 FEVER: Status: RESOLVED | Noted: 2020-07-19 | Resolved: 2023-04-17

## 2023-04-17 PROBLEM — D72.819 LEUKOPENIA: Status: RESOLVED | Noted: 2019-06-11 | Resolved: 2023-04-17

## 2023-04-17 PROBLEM — G93.40 ENCEPHALOPATHY ACUTE: Status: RESOLVED | Noted: 2019-06-11 | Resolved: 2023-04-17

## 2023-04-17 PROBLEM — G93.40 ENCEPHALOPATHY: Status: RESOLVED | Noted: 2020-10-24 | Resolved: 2023-04-17

## 2023-04-17 PROBLEM — R22.31 LOCALIZED SWELLING ON RIGHT HAND: Status: RESOLVED | Noted: 2020-10-24 | Resolved: 2023-04-17

## 2023-04-17 PROBLEM — R09.02 HYPOXIA: Status: RESOLVED | Noted: 2020-07-19 | Resolved: 2023-04-17

## 2023-04-17 LAB
FORWARD REASON: SPWHY: NORMAL
FORWARDED TO LAB: SPWHR: NORMAL
HBA1C MFR BLD: 5.9 % (ref ?–5.8)
POCT INT CON NEG: NEGATIVE
POCT INT CON POS: POSITIVE
SPECIMEN SENT: SPWT1: NORMAL

## 2023-04-17 PROCEDURE — G0009 ADMIN PNEUMOCOCCAL VACCINE: HCPCS | Performed by: STUDENT IN AN ORGANIZED HEALTH CARE EDUCATION/TRAINING PROGRAM

## 2023-04-17 PROCEDURE — 83036 HEMOGLOBIN GLYCOSYLATED A1C: CPT | Performed by: STUDENT IN AN ORGANIZED HEALTH CARE EDUCATION/TRAINING PROGRAM

## 2023-04-17 PROCEDURE — 90677 PCV20 VACCINE IM: CPT | Performed by: STUDENT IN AN ORGANIZED HEALTH CARE EDUCATION/TRAINING PROGRAM

## 2023-04-17 PROCEDURE — 99213 OFFICE O/P EST LOW 20 MIN: CPT | Mod: 25 | Performed by: STUDENT IN AN ORGANIZED HEALTH CARE EDUCATION/TRAINING PROGRAM

## 2023-04-17 RX ORDER — CYCLOBENZAPRINE HCL 10 MG
10 TABLET ORAL 2 TIMES DAILY PRN
Qty: 180 TABLET | Refills: 3 | Status: SHIPPED | OUTPATIENT
Start: 2023-04-17

## 2023-04-17 RX ORDER — SERTRALINE HYDROCHLORIDE 100 MG/1
100 TABLET, FILM COATED ORAL EVERY EVENING
Qty: 90 TABLET | Refills: 3 | Status: SHIPPED | OUTPATIENT
Start: 2023-04-17

## 2023-04-17 RX ORDER — NORTRIPTYLINE HYDROCHLORIDE 10 MG/1
10 CAPSULE ORAL
Qty: 90 CAPSULE | Refills: 1 | Status: SHIPPED | OUTPATIENT
Start: 2023-04-17 | End: 2023-10-13

## 2023-04-17 RX ORDER — CYCLOBENZAPRINE HCL 10 MG
10 TABLET ORAL 2 TIMES DAILY PRN
Qty: 20 TABLET | Refills: 0 | Status: CANCELLED | OUTPATIENT
Start: 2023-04-17

## 2023-04-17 RX ORDER — SUMATRIPTAN 50 MG/1
50 TABLET, FILM COATED ORAL
Qty: 10 TABLET | Refills: 3 | Status: CANCELLED | OUTPATIENT
Start: 2023-04-17

## 2023-04-17 RX ORDER — SUMATRIPTAN 50 MG/1
50 TABLET, FILM COATED ORAL
Qty: 10 TABLET | Refills: 3 | Status: SHIPPED | OUTPATIENT
Start: 2023-04-17

## 2023-04-17 RX ORDER — CLONIDINE HYDROCHLORIDE 0.1 MG/1
0.1 TABLET ORAL 2 TIMES DAILY PRN
Qty: 180 TABLET | Refills: 1 | Status: SHIPPED | OUTPATIENT
Start: 2023-04-17

## 2023-04-17 RX ORDER — OXYCODONE HYDROCHLORIDE 5 MG/1
TABLET ORAL
COMMUNITY
Start: 2023-04-13

## 2023-04-17 RX ORDER — ENALAPRIL MALEATE 20 MG/1
20 TABLET ORAL
Qty: 90 TABLET | Refills: 3 | Status: SHIPPED | OUTPATIENT
Start: 2023-04-17

## 2023-04-17 RX ORDER — NAPROXEN 500 MG/1
500 TABLET ORAL 2 TIMES DAILY WITH MEALS
Qty: 20 TABLET | Refills: 0 | Status: SHIPPED | OUTPATIENT
Start: 2023-04-17

## 2023-04-17 RX ORDER — ALBUTEROL SULFATE 90 UG/1
2 AEROSOL, METERED RESPIRATORY (INHALATION) EVERY 6 HOURS PRN
Qty: 8.5 G | Refills: 1 | Status: SHIPPED | OUTPATIENT
Start: 2023-04-17 | End: 2023-09-13

## 2023-04-17 RX ORDER — GABAPENTIN 800 MG/1
800 TABLET ORAL 3 TIMES DAILY
Qty: 270 TABLET | Refills: 1 | Status: CANCELLED | OUTPATIENT
Start: 2023-04-17

## 2023-04-17 RX ORDER — ENALAPRIL MALEATE 20 MG/1
20 TABLET ORAL
Qty: 90 TABLET | Refills: 3 | Status: CANCELLED | OUTPATIENT
Start: 2023-04-17

## 2023-04-17 RX ORDER — FLUTICASONE FUROATE AND VILANTEROL 200; 25 UG/1; UG/1
1 POWDER RESPIRATORY (INHALATION) DAILY
Qty: 60 EACH | Refills: 5 | Status: SHIPPED | OUTPATIENT
Start: 2023-04-17

## 2023-04-17 RX ORDER — GABAPENTIN 800 MG/1
800 TABLET ORAL 3 TIMES DAILY
Qty: 270 TABLET | Refills: 3 | Status: SHIPPED | OUTPATIENT
Start: 2023-04-17

## 2023-04-17 RX ORDER — SERTRALINE HYDROCHLORIDE 100 MG/1
100 TABLET, FILM COATED ORAL EVERY EVENING
Qty: 90 TABLET | Refills: 1 | Status: CANCELLED | OUTPATIENT
Start: 2023-04-17

## 2023-04-17 RX ORDER — ATORVASTATIN CALCIUM 20 MG/1
20 TABLET, FILM COATED ORAL DAILY
Qty: 90 TABLET | Refills: 3 | Status: SHIPPED | OUTPATIENT
Start: 2023-04-17

## 2023-04-17 RX ORDER — ATORVASTATIN CALCIUM 20 MG/1
TABLET, FILM COATED ORAL
Qty: 180 TABLET | Refills: 2 | Status: CANCELLED | OUTPATIENT
Start: 2023-04-17

## 2023-04-17 ASSESSMENT — ENCOUNTER SYMPTOMS
BACK PAIN: 1
DIAPHORESIS: 0
WHEEZING: 0
SHORTNESS OF BREATH: 0
BLOOD IN STOOL: 0
CHILLS: 0
WEIGHT LOSS: 1
PALPITATIONS: 0
BRUISES/BLEEDS EASILY: 0
FEVER: 0
FOCAL WEAKNESS: 0

## 2023-04-17 ASSESSMENT — PATIENT HEALTH QUESTIONNAIRE - PHQ9
5. POOR APPETITE OR OVEREATING: 1 - SEVERAL DAYS
SUM OF ALL RESPONSES TO PHQ QUESTIONS 1-9: 9
CLINICAL INTERPRETATION OF PHQ2 SCORE: 4

## 2023-04-17 NOTE — LETTER
[facility]  Darrick Smart M.D.  [address]  [facility] [place] [facility] [address]  Fax: [phone]   Authorization for Release/Disclosure of   Protected Health Information   Name: ELINA SKAGGS : 1960 SSN: [id number]   Address: [address] [address] Phone:    [phone] (home)    I authorize the entity listed below to release/disclose the PHI below to:   [facility]/Darrick Smart M.D. and Benjie Roper M.D.   Provider or Entity Name:  [facility]   Address   City, State, Zip   Phone:      Fax:     Reason for request: continuity of care   Information to be released:    [  xxxx] LAST COLONOSCOPY,  including any PATH REPORT and follow-up  [  ] LAST FIT/COLOGUARD RESULT [  ] LAST DEXA  [  ] LAST MAMMOGRAM  [  ] LAST PAP  [  ] LAST LABS [  ] RETINA EXAM REPORT  [  ] IMMUNIZATION RECORDS  [  ] Release all info      [  ] Check here and initial the line next to each item to release ALL health information INCLUDING  _____ Care and treatment for drug and / or alcohol abuse  _____ HIV testing, infection status, or AIDS  _____ Genetic Testing    DATES OF SERVICE OR TIME PERIOD TO BE DISCLOSED: _____________  I understand and acknowledge that:  * This Authorization may be revoked at any time by you in writing, except if your health information has already been used or disclosed.  * Your health information that will be used or disclosed as a result of you signing this authorization could be re-disclosed by the recipient. If this occurs, your re-disclosed health information may no longer be protected by State or Federal laws.  * You may refuse to sign this Authorization. Your refusal will not affect your ability to obtain treatment.  * This Authorization becomes effective upon signing and will  on (date) __________.      If no date is indicated, this Authorization will  one (1) year from the signature date.    Name: Elina Skaggs  Signature: Date:   2023     PLEASE FAX REQUESTED  RECORDS BACK TO: [phone]

## 2023-04-17 NOTE — PROGRESS NOTES
"Subjective:     CC: med refill.     HPI:   Elina presents today with  62-year-old female presents for medication refill and concern about a abrasion on her right nostril.    She reports gradual weight loss over the past 5 years about 20 pounds.  Last ultrasound of breasts 6/18/2019 no sonographic evidence of malignancy.  Reports she is up-to-date with colonoscopy records requested.  She is due for Pap smear.  Denies fever chills blood in the stools urinary issues, diaphoresis, unexplained fatigue.  CT scan 8/2019 for follow-up on left upper lobe nodule ground glass 4 mm.  - colonoscopy- record requested  - pap smear - will schedule  - mammogram - re ordered        Health Maintenance:     ROS:  Review of Systems   Constitutional:  Positive for weight loss. Negative for chills, diaphoresis, fever and malaise/fatigue.   Respiratory:  Negative for shortness of breath and wheezing.    Cardiovascular:  Negative for chest pain and palpitations.   Gastrointestinal:  Negative for blood in stool and melena.   Genitourinary:  Negative for hematuria.   Musculoskeletal:  Positive for back pain (chronic). Negative for joint pain.   Neurological:  Negative for focal weakness.   Endo/Heme/Allergies:  Does not bruise/bleed easily.     Objective:     Exam:  /78 (BP Location: Left arm, Patient Position: Sitting, BP Cuff Size: Adult)   Pulse 70   Temp 36.2 °C (97.2 °F) (Temporal)   Ht 1.626 m (5' 4\")   Wt 55.2 kg (121 lb 12.8 oz)   SpO2 98%   BMI 20.91 kg/m²  Body mass index is 20.91 kg/m².    Physical Exam  Constitutional:       Appearance: Normal appearance.   Cardiovascular:      Rate and Rhythm: Normal rate and regular rhythm.      Heart sounds: No murmur heard.  Pulmonary:      Effort: Pulmonary effort is normal.      Breath sounds: Normal breath sounds. No wheezing.   Musculoskeletal:      Cervical back: Normal range of motion and neck supple.   Lymphadenopathy:      Cervical: No cervical adenopathy.   Skin:     " Comments: Diabetic foot exam: No lesions or calluses noted. 2+ pedal pulses. Sensation intact with 10 out of 10 on monofilament test.     Neurological:      Mental Status: She is alert.       Labs: no recent    Assessment & Plan:     62 y.o. female with the following -       1. Abrasion of nose, initial encounter  Acute, patient noted acute abrasion of the right nostril report has been blowing her nose and treating her nasal canal with peroxide in the past few days.  Because of reported pain.  Plan  Hence stopped the use of peroxide  Recommend conservative management with heat heat compress  We will prescribe   - mupirocin (BACTROBAN) 2 % Ointment; Apply 1 Application. topically 2 times a day for 3 days.  Dispense: 22 g; Refill: 0    2. Chronic obstructive pulmonary disease, unspecified COPD type ([facility])  Clinical diagnosis  Refilled Breo  For chronic symptoms of shortness of breath, significant smoking history stable on Breo  - fluticasone furoate-vilanterol (BREO ELLIPTA) 200-25 MCG/ACT AEROSOL POWDER, BREATH ACTIVATED; Inhale 1 Puff every day.  Dispense: 60 Each; Refill: 5    3. Type 2 diabetes mellitus without complication, without long-term current use of insulin ([facility])  Chronic, stable  Diagnosed 2 years ago  Patient denies signs and symptoms of hyperglycemia  Repeat A1c was 5.9  Plan  Continue lifestyle modification  - CBC WITH DIFFERENTIAL; Future  - Comp Metabolic Panel; Future  - TSH WITH REFLEX TO FT4; Future  - Microalbumin Creat Ratio Urine - Clinic Collect; Future  - POCT Hemoglobin A1C  - Lipid Profile; Future    4. Encounter for screening mammogram for breast cancer    - MA-SCREENING MAMMO BILAT W/TOMOSYNTHESIS W/CAD; Future    5. Need for vaccination    - Pneumococcal Conjugate Vaccine 20-Valent (19 yrs+)    6. Dyslipidemia  Chronic , Stable  On atorvastatin 20 mg daily taking without adverse effect  - atorvastatin (LIPITOR) 20 MG Tab; Take 1 Tablet by mouth every day.  Dispense: 90 Tablet; Refill:  3    7. Essential hypertension  , Stable blood pressure under goal of 140s/80 on enalapril 20 mg daily  Plan  - enalapril (VASOTEC) 20 MG tablet; Take 1 Tablet by mouth every day.  Dispense: 90 Tablet; Refill: 3    8. Recurrent major depressive disorder, in partial remission (HCC)  Chronic, stable  Currently on sertraline 100 mg daily with somewhat good control of her symptoms  He reports the past year has been more difficult with the passing of his brother.  However she reports/denies SI HI  - sertraline (ZOLOFT) 100 MG Tab; Take 1 Tablet by mouth every evening.  Dispense: 90 Tablet; Refill: 3    9. Chronic nonintractable headache, unspecified headache type    - SUMAtriptan (IMITREX) 50 MG Tab; Take 1 Tablet by mouth 1 time a day as needed for Migraine.  Dispense: 10 Tablet; Refill: 3  - naproxen (NAPROSYN) 500 MG Tab; Take 1 Tablet by mouth 2 times a day with meals.  Dispense: 20 Tablet; Refill: 0    10. Cervical stenosis of spinal canal  Chronic, stable  - gabapentin (NEURONTIN) 800 MG tablet; Take 1 Tablet by mouth 3 times a day.  Dispense: 270 Tablet; Refill: 3    11. Encounter for hepatitis C screening test for low risk patient    - HEP C VIRUS ANTIBODY; Future      Return in about 4 weeks (around 5/15/2023) for Lab review/ weight loss.    Please note that this dictation was created using voice recognition software. I have made every reasonable attempt to correct obvious errors, but I expect that there are errors of grammar and possibly content that I did not discover before finalizing the note.

## 2023-04-20 LAB
ALBUMIN/CREAT UR: 19 MG/G CREAT (ref 0–29)
CREAT UR-MCNC: 54 MG/DL
MICROALBUMIN UR-MCNC: 10.4 UG/ML

## 2023-09-07 ENCOUNTER — HOSPITAL ENCOUNTER (EMERGENCY)
Facility: MEDICAL CENTER | Age: 63
End: 2023-09-07
Payer: MEDICARE

## 2023-09-07 VITALS
RESPIRATION RATE: 16 BRPM | OXYGEN SATURATION: 96 % | DIASTOLIC BLOOD PRESSURE: 116 MMHG | TEMPERATURE: 97.7 F | BODY MASS INDEX: 20.32 KG/M2 | HEIGHT: 64 IN | HEART RATE: 94 BPM | SYSTOLIC BLOOD PRESSURE: 190 MMHG | WEIGHT: 119.05 LBS

## 2023-09-07 PROCEDURE — 302449 STATCHG TRIAGE ONLY (STATISTIC)

## 2023-09-07 NOTE — ED NOTES
"Pt ambulated out of the ED screaming at her daughter. Screaming \"I do not want to be here let me go home\". She walked off of property. Will discharge from the system.  "

## 2023-09-07 NOTE — ED TRIAGE NOTES
"Chief Complaint   Patient presents with    Numbness     Arrives with daughter for decreased sensation of R cheek, pt states it has been 3 days     Possible Stroke     Change in speech   Per daughter, the change in speech has been for 1 month   Difficult to assess pt  No obvious unilateral weakness or facial droop when smiling    ALOC     Per daughter pt not acting like herself   Tardive dyskinesia like movements of mouth, tongue, face       BP (!) 190/116   Pulse 94   Temp 36.5 °C (97.7 °F) (Temporal)   Resp 16   Ht 1.626 m (5' 4\")   Wt 54 kg (119 lb 0.8 oz)   SpO2 96%   BMI 20.43 kg/m²     Pt made aware of triage process, placed back into lobby, educated pt to tell staff of any worsening of symptoms     "

## 2023-09-13 DIAGNOSIS — J45.40 MODERATE PERSISTENT ASTHMA WITHOUT COMPLICATION: ICD-10-CM

## 2023-09-13 RX ORDER — ALBUTEROL SULFATE 90 UG/1
2 AEROSOL, METERED RESPIRATORY (INHALATION) EVERY 6 HOURS PRN
Qty: 8.5 EACH | Refills: 1 | Status: SHIPPED | OUTPATIENT
Start: 2023-09-13 | End: 2023-11-07

## 2023-10-13 RX ORDER — NORTRIPTYLINE HYDROCHLORIDE 10 MG/1
10 CAPSULE ORAL
Qty: 90 CAPSULE | Refills: 1 | Status: SHIPPED | OUTPATIENT
Start: 2023-10-13

## 2023-10-15 ENCOUNTER — APPOINTMENT (OUTPATIENT)
Dept: RADIOLOGY | Facility: MEDICAL CENTER | Age: 63
End: 2023-10-15
Attending: EMERGENCY MEDICINE
Payer: MEDICARE

## 2023-10-15 ENCOUNTER — HOSPITAL ENCOUNTER (EMERGENCY)
Facility: MEDICAL CENTER | Age: 63
End: 2023-10-16
Attending: EMERGENCY MEDICINE
Payer: MEDICARE

## 2023-10-15 VITALS
SYSTOLIC BLOOD PRESSURE: 145 MMHG | WEIGHT: 122.8 LBS | RESPIRATION RATE: 18 BRPM | DIASTOLIC BLOOD PRESSURE: 72 MMHG | TEMPERATURE: 97.3 F | OXYGEN SATURATION: 98 % | BODY MASS INDEX: 20.96 KG/M2 | HEIGHT: 64 IN | HEART RATE: 70 BPM

## 2023-10-15 DIAGNOSIS — J34.89 NASAL PAIN: ICD-10-CM

## 2023-10-15 DIAGNOSIS — R29.810 FACIAL DROOP: ICD-10-CM

## 2023-10-15 LAB
ANION GAP SERPL CALC-SCNC: 7 MMOL/L (ref 7–16)
BASOPHILS # BLD AUTO: 1.2 % (ref 0–1.8)
BASOPHILS # BLD: 0.08 K/UL (ref 0–0.12)
BUN SERPL-MCNC: 23 MG/DL (ref 8–22)
CALCIUM SERPL-MCNC: 9.5 MG/DL (ref 8.5–10.5)
CHLORIDE SERPL-SCNC: 103 MMOL/L (ref 96–112)
CO2 SERPL-SCNC: 28 MMOL/L (ref 20–33)
CREAT SERPL-MCNC: 0.85 MG/DL (ref 0.5–1.4)
EOSINOPHIL # BLD AUTO: 0.21 K/UL (ref 0–0.51)
EOSINOPHIL NFR BLD: 3.2 % (ref 0–6.9)
ERYTHROCYTE [DISTWIDTH] IN BLOOD BY AUTOMATED COUNT: 44.2 FL (ref 35.9–50)
GFR SERPLBLD CREATININE-BSD FMLA CKD-EPI: 76 ML/MIN/1.73 M 2
GLUCOSE SERPL-MCNC: 79 MG/DL (ref 65–99)
HCT VFR BLD AUTO: 37.3 % (ref 37–47)
HGB BLD-MCNC: 11.9 G/DL (ref 12–16)
IMM GRANULOCYTES # BLD AUTO: 0.01 K/UL (ref 0–0.11)
IMM GRANULOCYTES NFR BLD AUTO: 0.2 % (ref 0–0.9)
LYMPHOCYTES # BLD AUTO: 3.98 K/UL (ref 1–4.8)
LYMPHOCYTES NFR BLD: 60.8 % (ref 22–41)
MCH RBC QN AUTO: 29.9 PG (ref 27–33)
MCHC RBC AUTO-ENTMCNC: 31.9 G/DL (ref 32.2–35.5)
MCV RBC AUTO: 93.7 FL (ref 81.4–97.8)
MONOCYTES # BLD AUTO: 0.38 K/UL (ref 0–0.85)
MONOCYTES NFR BLD AUTO: 5.8 % (ref 0–13.4)
NEUTROPHILS # BLD AUTO: 1.89 K/UL (ref 1.82–7.42)
NEUTROPHILS NFR BLD: 28.8 % (ref 44–72)
NRBC # BLD AUTO: 0 K/UL
NRBC BLD-RTO: 0 /100 WBC (ref 0–0.2)
PLATELET # BLD AUTO: 261 K/UL (ref 164–446)
PMV BLD AUTO: 10.6 FL (ref 9–12.9)
POTASSIUM SERPL-SCNC: 3.8 MMOL/L (ref 3.6–5.5)
RBC # BLD AUTO: 3.98 M/UL (ref 4.2–5.4)
SODIUM SERPL-SCNC: 138 MMOL/L (ref 135–145)
WBC # BLD AUTO: 6.6 K/UL (ref 4.8–10.8)

## 2023-10-15 PROCEDURE — 80048 BASIC METABOLIC PNL TOTAL CA: CPT

## 2023-10-15 PROCEDURE — 70491 CT SOFT TISSUE NECK W/DYE: CPT

## 2023-10-15 PROCEDURE — 36415 COLL VENOUS BLD VENIPUNCTURE: CPT

## 2023-10-15 PROCEDURE — 70450 CT HEAD/BRAIN W/O DYE: CPT

## 2023-10-15 PROCEDURE — 85025 COMPLETE CBC W/AUTO DIFF WBC: CPT

## 2023-10-15 PROCEDURE — 99283 EMERGENCY DEPT VISIT LOW MDM: CPT

## 2023-10-15 PROCEDURE — 700117 HCHG RX CONTRAST REV CODE 255: Performed by: EMERGENCY MEDICINE

## 2023-10-15 RX ADMIN — IOHEXOL 90 ML: 350 INJECTION, SOLUTION INTRAVENOUS at 23:15

## 2023-10-16 NOTE — DISCHARGE INSTRUCTIONS
Follow-up with primary care this week for reevaluation of recurring nasal discomfort, and more so for change in voice, facial droop.  Further outpatient evaluation/work-up is encouraged and may include that of stroke evaluation or MRI.    Continue home medications as previously indicated.    Tylenol or ibuprofen as needed for discomfort.    Return to the emergency department for persistent or worsening facial pain, swelling, redness or for headache, altered mental status, slurred speech, focal weakness, for any fever, vomiting or other new concerns.

## 2023-10-16 NOTE — ED TRIAGE NOTES
Elina Skaggs  63 y.o. female  Chief Complaint   Patient presents with    Nasal Pain     Right nostril pain since August. Reports coming to ER twice for the same. Denies bleeding.      Pt ambulatory to triage with steady gait for above complaint.     Pt is GCS 15, speaking in full sentences, follows commands and responds appropriately to questions. Resp are even and unlabored.   Pt placed in ED lobby. Pt educated on triage process. Pt encouraged to alert staff for any changes.       Vitals:    10/15/23 2002   BP: (!) 146/77   Pulse: 74   Resp: 20   Temp: 36.4 °C (97.5 °F)   SpO2: 98%

## 2023-10-16 NOTE — ED PROVIDER NOTES
ED Provider Note    CHIEF COMPLAINT  Chief Complaint   Patient presents with    Nasal Pain     Right nostril pain since August. Reports coming to ER twice for the same. Denies bleeding.        EXTERNAL RECORDS REVIEWED  Other no pertinent records available for review    HPI/ROS  LIMITATION TO HISTORY   Select: : None  OUTSIDE HISTORIAN(S):  Family granddaughter    Elina Skaggs is a 63 y.o. female who presents to the emergency department through triage for nose pain.  Patient describes pain in her right nostril intermittent since August.  She believes pain started after spending time near or smelling a flower (she was an Internet picture of Datura).  Patient states she has since read that this could be poisonous.  She may have had some mental, emotional changes from it as well but this resolved.  States she was seen at [facility] and discharged home, came here once but left because of weight.    She is having recurrent right nostril discomfort as well as that at the tip of the nose.  Denies discharge but has pruritus.  Denies epistaxis.  Denies headache, visual changes.  Family states that patient's voice has changed, become more high-pitched since August when the symptoms began.  She has some changes in movement the right side of her face.  No difficulty swallowing or breathing.  No fever or chills.    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    SURGICAL HISTORY   has a past surgical history that includes tubal coagulation laparoscopic bilateral;  appendectomy; appendectomy child (1974); colonoscopy with polyp (4/1/13); cervical disk and fusion anterior (11/16/2010); cervical fusion posterior (6/19/2012); mass excision general (Left, 2017); and implant neurostim/ (N/A, 11/7/2019).    FAMILY HISTORY  Family History   Problem Relation Age of Onset    Cancer Mother         lung cancer    Cancer Father         lung    Heart Disease Father         MI    Diabetes Father     Genitourinary () Problems Father         renal failure on dialysis    Stroke Sister     Diabetes Sister     Genitourinary () Problems Sister         renal failure    Diabetes Brother     Ovarian Cancer Maternal Aunt     Colorectal Cancer Neg Hx     Breast Cancer Neg Hx     Tubal Cancer Neg Hx     Peritoneal Cancer Neg Hx        SOCIAL HISTORY  Social History     Tobacco Use    Smoking status: Every Day     Current packs/day: 0.50     Average packs/day: 0.5 packs/day for 37.0 years (18.5 ttl pk-yrs)     Types: Cigarettes    Smokeless tobacco: Never    Tobacco comments:     she has n ot smoked in 2 weeks   Vaping Use    Vaping Use: Never used   Substance and Sexual Activity    Alcohol use: No    Drug use: No    Sexual activity: Never     Partners: Male     Birth control/protection: Surgical       CURRENT MEDICATIONS  Home Medications       Reviewed by Merlin Denton R.N. (Registered Nurse) on 10/15/23 at 2009  Med List Status: Partial     Medication Last Dose Status   albuterol 108 (90 Base) MCG/ACT Aero Soln inhalation aerosol  Active   aspirin (ASA) 81 MG Chew Tab chewable tablet  Active   atorvastatin (LIPITOR) 20 MG Tab  Active   cloNIDine (CATAPRES) 0.1 MG Tab  Active   cyclobenzaprine (FLEXERIL) 10 mg Tab  Active   enalapril (VASOTEC) 20 MG tablet  Active   fluticasone furoate-vilanterol (BREO ELLIPTA) 200-25 MCG/ACT AEROSOL POWDER, BREATH ACTIVATED  Active   gabapentin (NEURONTIN) 800 MG tablet  Active   naproxen (NAPROSYN) 500 MG Tab  Active   nortriptyline (PAMELOR) 10 MG Cap   "Active   oxyCODONE immediate-release (ROXICODONE) 5 MG Tab  Active   sertraline (ZOLOFT) 100 MG Tab  Active   SUMAtriptan (IMITREX) 50 MG Tab  Active                    ALLERGIES  Allergies   Allergen Reactions    Ambien [Zolpidem]      Sleep walking    Cymbalta [Duloxetine Hcl] Rash and Itching     Redness to anterior neck.       PHYSICAL EXAM  VITAL SIGNS: BP (!) 145/72   Pulse 70   Temp 36.3 °C (97.3 °F) (Temporal)   Resp 18   Ht 1.626 m (5' 4\")   Wt 55.7 kg (122 lb 12.7 oz)   SpO2 98%   BMI 21.08 kg/m²    Pulse ox interpretation: I interpret this pulse ox as normal.  Constitutional: Alert in no apparent distress.  HENT: Normocephalic, atraumatic. Bilateral external ears normal, TMs are clear bilaterally.  Nose normal.  No nasal swelling, induration or fluctuance at the tip.  Bilateral nares are unremarkable, right slightly macerated without other skin breakdown or lesions.  No turbinate enlargement.  No rhinorrhea.  No tenderness over the maxillary frontal sinuses.  Oropharynx unremarkable without erythema, edema or exudate.  Moist mucous membranes.    Eyes: Pupils are equal and reactive, Conjunctiva normal.   Neck: Normal range of motion, Supple  Lymphatic: No lymphadenopathy noted.   Cardiovascular: Normal peripheral perfusion  Thorax & Lungs: Nonlabored respirations  Skin: Warm, Dry   Musculoskeletal: Good range of motion in all major joints.   Neurologic: Alert and oriented x4.  Speech is clear and cohesive although high-pitched per family.  She does appear to have some droop or change in muscle contraction of the right upper lip while speaking.  Otherwise no active muscle weakness on exam.  5 out of 5 strength in 4 extremities with proximal distal muscle groups.  Ambulates independently  Psychiatric: Anxious, odd affect otherwise cooperative      DIAGNOSTIC STUDIES / PROCEDURES    LABS  Results for orders placed or performed during the hospital encounter of 10/15/23   CBC WITH DIFFERENTIAL   Result " Value Ref Range    WBC 6.6 4.8 - 10.8 K/uL    RBC 3.98 (L) 4.20 - 5.40 M/uL    Hemoglobin 11.9 (L) 12.0 - 16.0 g/dL    Hematocrit 37.3 37.0 - 47.0 %    MCV 93.7 81.4 - 97.8 fL    MCH 29.9 27.0 - 33.0 pg    MCHC 31.9 (L) 32.2 - 35.5 g/dL    RDW 44.2 35.9 - 50.0 fL    Platelet Count 261 164 - 446 K/uL    MPV 10.6 9.0 - 12.9 fL    Neutrophils-Polys 28.80 (L) 44.00 - 72.00 %    Lymphocytes 60.80 (H) 22.00 - 41.00 %    Monocytes 5.80 0.00 - 13.40 %    Eosinophils 3.20 0.00 - 6.90 %    Basophils 1.20 0.00 - 1.80 %    Immature Granulocytes 0.20 0.00 - 0.90 %    Nucleated RBC 0.00 0.00 - 0.20 /100 WBC    Neutrophils (Absolute) 1.89 1.82 - 7.42 K/uL    Lymphs (Absolute) 3.98 1.00 - 4.80 K/uL    Monos (Absolute) 0.38 0.00 - 0.85 K/uL    Eos (Absolute) 0.21 0.00 - 0.51 K/uL    Baso (Absolute) 0.08 0.00 - 0.12 K/uL    Immature Granulocytes (abs) 0.01 0.00 - 0.11 K/uL    NRBC (Absolute) 0.00 K/uL   BASIC METABOLIC PANEL   Result Value Ref Range    Sodium 138 135 - 145 mmol/L    Potassium 3.8 3.6 - 5.5 mmol/L    Chloride 103 96 - 112 mmol/L    Co2 28 20 - 33 mmol/L    Glucose 79 65 - 99 mg/dL    Bun 23 (H) 8 - 22 mg/dL    Creatinine 0.85 0.50 - 1.40 mg/dL    Calcium 9.5 8.5 - 10.5 mg/dL    Anion Gap 7.0 7.0 - 16.0   ESTIMATED GFR   Result Value Ref Range    GFR (CKD-EPI) 76 >60 mL/min/1.73 m 2     RADIOLOGY  I have independently interpreted the diagnostic imaging associated with this visit and am waiting the final reading from the radiologist.     Radiologist interpretation:   CT-SOFT TISSUE NECK WITH   Final Result         1.  Unremarkable CT of the neck, no focal abscess is identified.   2.  Atherosclerosis      CT-HEAD W/O   Final Result         1.  No acute intracranial abnormality.   2.  Atherosclerosis.             COURSE & MEDICAL DECISION MAKING    ED Observation Status? No; Patient does not meet criteria for ED Observation.     INITIAL ASSESSMENT, COURSE AND PLAN  Care Narrative:   ED evaluation for nasal pain is  unrevealing.  Physical exam is unremarkable.  Patient told that she could apply nasal lubricant, antibiotic ointment or even Vaseline to see if this helps with the burning discomfort in the right nare.  No clinical or radiographic evidence for deep space infection, abscess or other abnormality.  Labs are unremarkable.  CT head is also negative after change in voice, facial musculature or droop.  Cannot completely exclude stroke, however given 2 months of symptoms, outpatient work-up can ensue.  Patient was provided reassurance.    ADDITIONAL PROBLEM LIST    DISPOSITION AND DISCUSSION    Escalation of care considered, and ultimately not performed:diagnostic imaging and acute inpatient care management, however at this time, the patient is most appropriate for outpatient management    The patient is stable for discharge home, anticipatory guidance provided, close follow-up is encouraged and strict return instructions have been discussed. Patient is agreeable to the disposition and plan.      FINAL DIAGNOSIS  1. Nasal pain    2. Facial droop           Electronically signed by: Tila Pickett D.O., 10/15/2023 11:51 PM

## 2023-11-04 DIAGNOSIS — J45.40 MODERATE PERSISTENT ASTHMA WITHOUT COMPLICATION: ICD-10-CM

## 2023-11-07 RX ORDER — ALBUTEROL SULFATE 90 UG/1
2 AEROSOL, METERED RESPIRATORY (INHALATION) EVERY 6 HOURS PRN
Qty: 8.5 EACH | Refills: 1 | Status: SHIPPED | OUTPATIENT
Start: 2023-11-07

## 2024-06-15 DIAGNOSIS — G89.29 CHRONIC NONINTRACTABLE HEADACHE, UNSPECIFIED HEADACHE TYPE: ICD-10-CM

## 2024-06-15 DIAGNOSIS — F33.41 RECURRENT MAJOR DEPRESSIVE DISORDER, IN PARTIAL REMISSION (HCC): ICD-10-CM

## 2024-06-15 DIAGNOSIS — R51.9 CHRONIC NONINTRACTABLE HEADACHE, UNSPECIFIED HEADACHE TYPE: ICD-10-CM

## 2024-06-17 RX ORDER — SERTRALINE HYDROCHLORIDE 100 MG/1
100 TABLET, FILM COATED ORAL EVERY EVENING
Qty: 90 TABLET | Refills: 3 | Status: SHIPPED | OUTPATIENT
Start: 2024-06-17

## 2024-06-17 RX ORDER — SUMATRIPTAN 50 MG/1
50 TABLET, FILM COATED ORAL
Qty: 9 TABLET | Refills: 4 | Status: SHIPPED | OUTPATIENT
Start: 2024-06-17

## 2024-06-17 RX ORDER — CYCLOBENZAPRINE HCL 10 MG
10 TABLET ORAL 2 TIMES DAILY PRN
Qty: 180 TABLET | Refills: 3 | Status: SHIPPED | OUTPATIENT
Start: 2024-06-17 | End: 2024-06-17

## (undated) DEVICE — SUTURE GENERAL

## (undated) DEVICE — GLOVE BIOGEL SZ 7.5 SURGICAL PF LTX - (50PR/BX 4BX/CA)

## (undated) DEVICE — CONTAINER, SPECIMEN, STERILE

## (undated) DEVICE — DRAPE C-ARM LARGE 41IN X 74 IN - (10/BX 2BX/CA)

## (undated) DEVICE — DRAPE LAPAROTOMY T SHEET - (12EA/CA)

## (undated) DEVICE — NEPTUNE 4 PORT MANIFOLD - (20/PK)

## (undated) DEVICE — HEAD HOLDER JUNIOR/ADULT

## (undated) DEVICE — GLOVE BIOGEL PI INDICATOR SZ 7.5 SURGICAL PF LF -(50/BX 4BX/CA)

## (undated) DEVICE — BLADE SURGICAL #10 - (50/BX)

## (undated) DEVICE — Device

## (undated) DEVICE — DRESSING NON ADHERENT 3 X 4 - STERILE (100/BX 12BX/CA)

## (undated) DEVICE — SYRINGE 10 ML CONTROL LL (25EA/BX 4BX/CA)

## (undated) DEVICE — ELECTRODE DUAL RETURN W/ CORD - (50/PK)

## (undated) DEVICE — KIT ROOM DECONTAMINATION

## (undated) DEVICE — NEEDLE NON SAFETY HYPO 22 GA X 1 1/2 IN (100/BX)

## (undated) DEVICE — SUTURE 0 ETHIBOND CT-1 - (12/BX) 18 INCH

## (undated) DEVICE — SYRINGE LOSS OF RESIST. 50/BX - (50/CA)

## (undated) DEVICE — SUTURE 2-0 VICRYL PLUS CT-1 36 (36PK/BX)"

## (undated) DEVICE — BINDER ABDOMINAL FITS WAIST 36 IN-65IN MED/LRG (1/EA)

## (undated) DEVICE — SODIUM CHL IRRIGATION 0.9% 1000ML (12EA/CA)

## (undated) DEVICE — DRAPE STRLE REG TOWEL 18X24 - (10/BX 4BX/CA)"

## (undated) DEVICE — DRESSING TRANSPARENT FILM TEGADERM 4 X 4.75" (50EA/BX)"

## (undated) DEVICE — DRESSING ABDOMINAL PAD STERILE 8 X 10" (360EA/CA)"

## (undated) DEVICE — NEEDLE SPINAL NON-SAFETY 25GA X 3 (25EA/BX)"

## (undated) DEVICE — DRAPE IOBAN II INCISE 23X17 - (10EA/BX 4BX/CA)

## (undated) DEVICE — TELFA 8 X 10 BIOSEAL - (50EA/CA)

## (undated) DEVICE — PACK MINOR BASIN - (2EA/CA)

## (undated) DEVICE — BLADE SURGICAL #15 - (50/BX 3BX/CA)